# Patient Record
Sex: MALE | Race: BLACK OR AFRICAN AMERICAN | NOT HISPANIC OR LATINO | Employment: UNEMPLOYED | URBAN - METROPOLITAN AREA
[De-identification: names, ages, dates, MRNs, and addresses within clinical notes are randomized per-mention and may not be internally consistent; named-entity substitution may affect disease eponyms.]

---

## 2017-01-10 DIAGNOSIS — M54.16 LUMBAR RADICULITIS: ICD-10-CM

## 2017-01-10 DIAGNOSIS — M47.816 SPONDYLOSIS OF LUMBAR REGION WITHOUT MYELOPATHY OR RADICULOPATHY: ICD-10-CM

## 2017-01-10 RX ORDER — GABAPENTIN 300 MG/1
CAPSULE ORAL
Qty: 30 CAPSULE | Refills: 2 | Status: SHIPPED | OUTPATIENT
Start: 2017-01-10 | End: 2017-02-13 | Stop reason: SDUPTHER

## 2017-01-12 ENCOUNTER — TELEPHONE (OUTPATIENT)
Dept: PAIN MEDICINE | Facility: CLINIC | Age: 47
End: 2017-01-12

## 2017-01-12 NOTE — TELEPHONE ENCOUNTER
Spoke with patient and confirmed injection appointment tomorrow. Instructions taught again. Patient acknowledged.

## 2017-01-12 NOTE — TELEPHONE ENCOUNTER
----- Message from Ekta Addison sent at 1/12/2017  9:43 AM CST -----  Patient returned your call.  Call him at [phone].                           juarez

## 2017-01-13 ENCOUNTER — SURGERY (OUTPATIENT)
Age: 47
End: 2017-01-13

## 2017-01-13 ENCOUNTER — HOSPITAL ENCOUNTER (OUTPATIENT)
Dept: RADIOLOGY | Facility: HOSPITAL | Age: 47
Discharge: HOME | End: 2017-01-13
Attending: ANESTHESIOLOGY | Admitting: ANESTHESIOLOGY
Payer: COMMERCIAL

## 2017-01-13 DIAGNOSIS — M54.16 BILATERAL LUMBAR RADICULOPATHY: ICD-10-CM

## 2017-01-13 PROCEDURE — 64484 NJX AA&/STRD TFRM EPI L/S EA: CPT

## 2017-01-13 PROCEDURE — 64483 NJX AA&/STRD TFRM EPI L/S 1: CPT

## 2017-01-13 PROCEDURE — 25500020 PHARM REV CODE 255

## 2017-01-13 RX ADMIN — DEXAMETHASONE SODIUM PHOSPHATE 20 MG: 4 INJECTION, SOLUTION INTRA-ARTICULAR; INTRALESIONAL; INTRAMUSCULAR; INTRAVENOUS; SOFT TISSUE at 08:01

## 2017-01-18 ENCOUNTER — OFFICE VISIT (OUTPATIENT)
Dept: ORTHOPEDICS | Facility: CLINIC | Age: 47
End: 2017-01-18
Payer: COMMERCIAL

## 2017-01-18 ENCOUNTER — APPOINTMENT (OUTPATIENT)
Dept: RADIOLOGY | Facility: HOSPITAL | Age: 47
End: 2017-01-18
Attending: ORTHOPAEDIC SURGERY
Payer: COMMERCIAL

## 2017-01-18 VITALS
HEART RATE: 75 BPM | HEIGHT: 76 IN | WEIGHT: 220 LBS | DIASTOLIC BLOOD PRESSURE: 111 MMHG | SYSTOLIC BLOOD PRESSURE: 156 MMHG | BODY MASS INDEX: 26.79 KG/M2

## 2017-01-18 DIAGNOSIS — M23.91 INTERNAL DERANGEMENT OF KNEE JOINT, RIGHT: ICD-10-CM

## 2017-01-18 DIAGNOSIS — M25.561 RIGHT KNEE PAIN, UNSPECIFIED CHRONICITY: ICD-10-CM

## 2017-01-18 DIAGNOSIS — M17.32 POST-TRAUMATIC OSTEOARTHRITIS OF LEFT KNEE: Primary | ICD-10-CM

## 2017-01-18 DIAGNOSIS — M17.12 ARTHRITIS OF KNEE, LEFT: ICD-10-CM

## 2017-01-18 DIAGNOSIS — M25.561 RIGHT KNEE PAIN, UNSPECIFIED CHRONICITY: Primary | ICD-10-CM

## 2017-01-18 DIAGNOSIS — M23.91 INTERNAL DERANGEMENT OF KNEE JOINT, RIGHT: Primary | ICD-10-CM

## 2017-01-18 DIAGNOSIS — M51.36 DDD (DEGENERATIVE DISC DISEASE), LUMBAR: ICD-10-CM

## 2017-01-18 DIAGNOSIS — M47.816 SPONDYLOSIS OF LUMBAR REGION WITHOUT MYELOPATHY OR RADICULOPATHY: ICD-10-CM

## 2017-01-18 PROCEDURE — 99214 OFFICE O/P EST MOD 30 MIN: CPT | Mod: S$GLB,,, | Performed by: ORTHOPAEDIC SURGERY

## 2017-01-18 PROCEDURE — 73560 X-RAY EXAM OF KNEE 1 OR 2: CPT | Mod: TC,59,PO,LT

## 2017-01-18 PROCEDURE — 73562 X-RAY EXAM OF KNEE 3: CPT | Mod: 26,RT,, | Performed by: RADIOLOGY

## 2017-01-18 PROCEDURE — 99999 PR PBB SHADOW E&M-EST. PATIENT-LVL III: CPT | Mod: PBBFAC,,, | Performed by: ORTHOPAEDIC SURGERY

## 2017-01-18 PROCEDURE — 73560 X-RAY EXAM OF KNEE 1 OR 2: CPT | Mod: 26,59,LT, | Performed by: RADIOLOGY

## 2017-01-18 PROCEDURE — 1159F MED LIST DOCD IN RCRD: CPT | Mod: S$GLB,,, | Performed by: ORTHOPAEDIC SURGERY

## 2017-01-18 RX ORDER — OXAPROZIN 600 MG/1
1200 TABLET, FILM COATED ORAL DAILY
Qty: 60 TABLET | Refills: 0 | Status: SHIPPED | OUTPATIENT
Start: 2017-01-18 | End: 2017-03-11 | Stop reason: SDUPTHER

## 2017-01-18 NOTE — MR AVS SNAPSHOT
[facility]  [address]  [place] [address]  Phone: [phone]                  Cornel Dick   2017 9:00 AM   Office Visit    Description:  Male : 1970   Provider:  Cristiano Ibarra MD   Department:  [facility]           Reason for Visit     Right Knee - Pain           Diagnoses this Visit        Comments    Post-traumatic osteoarthritis of left knee    -  Primary     DDD (degenerative disc disease), lumbar         Spondylosis of lumbar region without myelopathy or radiculopathy         Internal derangement of knee joint, right         Arthritis of knee, left                To Do List           Future Appointments        Provider Department Dept Phone    2017 4:00 PM Luis Reyes MD [facility] [phone]    2017 8:15 AM Wilson Bahena PA-C [facility] [phone]    2017 9:20 AM Mabel Barillas DPM [facility] [phone]      Goals (5 Years of Data)     None      Follow-Up and Disposition     Return in about 3 weeks (around 2017).       These Medications        Disp Refills Start End    oxaprozin (DAYPRO) 600 mg tablet 60 tablet 0 2017     Take 2 tablets (1,200 mg total) by mouth once daily. - Oral    Pharmacy: [facility] [address] [place] [address] Ph #: [phone]         [facility] On [facility]     [facility] On [facility] Nurse Care Line -  Assistance  Registered nurses in the [facility] Center provide clinical advisement, health education, appointment booking, and other advisory services.  Call for this free service at [phone].             Medications           Message regarding Medications     Verify the changes and/or additions to your medication regime listed below are the same as discussed with your clinician today.  If any of these changes or additions are incorrect, please notify your healthcare provider.        CHANGE how you are taking these  "medications     Start Taking Instead of    oxaprozin (DAYPRO) 600 mg tablet oxaprozin (DAYPRO) 600 mg tablet    Dosage:  Take 2 tablets (1,200 mg total) by mouth once daily. Dosage:  TAKE ONE TABLET BY MOUTH ONCE DAILY    Reason for Change:  Reorder            Verify that the below list of medications is an accurate representation of the medications you are currently taking.  If none reported, the list may be blank. If incorrect, please contact your healthcare provider. Carry this list with you in case of emergency.           Current Medications     ACETAMINOPHEN (ARTHRITIS PAIN RELIEVER ORAL) Take by mouth.    amlodipine (NORVASC) 10 MG tablet     efinaconazole (JUBLIA) 10 % Lilibeth Apply 1 application topically once daily.    gabapentin (NEURONTIN) 300 MG capsule TAKE ONE CAPSULE BY MOUTH IN THE EVENING    hydrocodone-acetaminophen 5-325mg (NORCO) 5-325 mg per tablet Take 1 tablet by mouth 2 (two) times daily as needed for Pain.    lisinopril-hydrochlorothiazide (PRINZIDE,ZESTORETIC) 20-12.5 mg per tablet     MV-MN/FA/LYCOPENE/LUT/HB#178 (HUBERT MULTIVITAMIN FOR MEN ORAL) Take by mouth.    oxaprozin (DAYPRO) 600 mg tablet Take 2 tablets (1,200 mg total) by mouth once daily.    pravastatin (PRAVACHOL) 40 MG tablet     tizanidine (ZANAFLEX) 4 MG tablet TAKE 1 TABLET BY MOUTH NIGHTLY AS NEEDED    tramadol (ULTRAM) 50 mg tablet TAKE ONE TABLET BY MOUTH EVERY 6 HOURS AS NEEDED FOR PAIN           Clinical Reference Information           Vital Signs - Last Recorded  Most recent update: 1/18/2017  9:22 AM by Candida Angulo MA    BP Pulse Ht Wt BMI    (!) 156/111 75 6' 4" (1.93 m) 99.8 kg (220 lb) 26.78 kg/m2      Blood Pressure          Most Recent Value    BP  (!)  156/111      Allergies as of 1/18/2017     Chocolate Flavor      Immunizations Administered on Date of Encounter - 1/18/2017     None      [facility] Sign-Up     Activating your [facility] account is as easy as 1-2-3!     1) Visit [email or web address], select Sign Up " Now, enter this activation code and your date of birth, then select Next.  [email or web address]  Expires: 2/2/2017 12:41 PM      2) Create a username and password to use when you visit [facility] in the future and select a security question in case you lose your password and select Next.    3) Enter your e-mail address and click Sign Up!    Additional Information  If you have questions, please e-mail [email or web address] or call [phone] to talk to our [facility] staff. Remember, [facility] is NOT to be used for urgent needs. For medical emergencies, dial 911.

## 2017-01-24 DIAGNOSIS — M25.572 LEFT ANKLE PAIN: ICD-10-CM

## 2017-01-24 DIAGNOSIS — M17.12 OSTEOARTHRITIS OF LEFT KNEE, UNSPECIFIED OSTEOARTHRITIS TYPE: ICD-10-CM

## 2017-01-24 DIAGNOSIS — M25.562 LEFT KNEE PAIN: ICD-10-CM

## 2017-01-26 ENCOUNTER — HOSPITAL ENCOUNTER (OUTPATIENT)
Dept: RADIOLOGY | Facility: HOSPITAL | Age: 47
Discharge: HOME | End: 2017-01-26
Attending: ORTHOPAEDIC SURGERY
Payer: COMMERCIAL

## 2017-01-26 DIAGNOSIS — M25.561 RIGHT KNEE PAIN, UNSPECIFIED CHRONICITY: ICD-10-CM

## 2017-01-26 DIAGNOSIS — M23.91 INTERNAL DERANGEMENT OF KNEE JOINT, RIGHT: ICD-10-CM

## 2017-01-26 PROCEDURE — 73721 MRI JNT OF LWR EXTRE W/O DYE: CPT | Mod: TC,RT

## 2017-01-28 RX ORDER — TRAMADOL HYDROCHLORIDE 50 MG/1
TABLET ORAL
Qty: 50 TABLET | Refills: 0 | Status: SHIPPED | OUTPATIENT
Start: 2017-01-28 | End: 2017-02-01 | Stop reason: SDUPTHER

## 2017-01-31 ENCOUNTER — INITIAL CONSULT (OUTPATIENT)
Dept: RHEUMATOLOGY | Facility: CLINIC | Age: 47
End: 2017-01-31
Payer: COMMERCIAL

## 2017-01-31 ENCOUNTER — HOSPITAL ENCOUNTER (OUTPATIENT)
Dept: RADIOLOGY | Facility: HOSPITAL | Age: 47
Discharge: HOME | End: 2017-01-31
Attending: INTERNAL MEDICINE
Payer: COMMERCIAL

## 2017-01-31 VITALS
WEIGHT: 220.69 LBS | SYSTOLIC BLOOD PRESSURE: 150 MMHG | BODY MASS INDEX: 26.86 KG/M2 | DIASTOLIC BLOOD PRESSURE: 96 MMHG | HEART RATE: 78 BPM

## 2017-01-31 DIAGNOSIS — M25.50 POLYARTHRALGIA: ICD-10-CM

## 2017-01-31 DIAGNOSIS — M25.50 POLYARTHRALGIA: Primary | ICD-10-CM

## 2017-01-31 PROCEDURE — 96372 THER/PROPH/DIAG INJ SC/IM: CPT | Mod: S$GLB,,, | Performed by: INTERNAL MEDICINE

## 2017-01-31 PROCEDURE — 99999 PR PBB SHADOW E&M-EST. PATIENT-LVL III: CPT | Mod: PBBFAC,,, | Performed by: INTERNAL MEDICINE

## 2017-01-31 PROCEDURE — 73130 X-RAY EXAM OF HAND: CPT | Mod: 50,TC,PO

## 2017-01-31 PROCEDURE — 73130 X-RAY EXAM OF HAND: CPT | Mod: 26,50,, | Performed by: RADIOLOGY

## 2017-01-31 PROCEDURE — 73630 X-RAY EXAM OF FOOT: CPT | Mod: 26,50,, | Performed by: RADIOLOGY

## 2017-01-31 PROCEDURE — 73630 X-RAY EXAM OF FOOT: CPT | Mod: 50,TC,PO

## 2017-01-31 PROCEDURE — 99245 OFF/OP CONSLTJ NEW/EST HI 55: CPT | Mod: 25,S$GLB,, | Performed by: INTERNAL MEDICINE

## 2017-01-31 RX ORDER — BETAMETHASONE SODIUM PHOSPHATE AND BETAMETHASONE ACETATE 3; 3 MG/ML; MG/ML
6 INJECTION, SUSPENSION INTRA-ARTICULAR; INTRALESIONAL; INTRAMUSCULAR; SOFT TISSUE
Status: COMPLETED | OUTPATIENT
Start: 2017-01-31 | End: 2017-01-31

## 2017-01-31 RX ADMIN — BETAMETHASONE SODIUM PHOSPHATE AND BETAMETHASONE ACETATE 6 MG: 3; 3 INJECTION, SUSPENSION INTRA-ARTICULAR; INTRALESIONAL; INTRAMUSCULAR; SOFT TISSUE at 04:01

## 2017-01-31 NOTE — ASSESSMENT & PLAN NOTE
He has chronic pain over multiple joints including both small and large joints along with lumbago.  Examination shows mild puffiness around bilateral second and third metacarpophalangeal joints and second and third proximal interphalangeal joints but the tenderness was out of proportion likely secondary to the underlying myofascial pain syndrome.  Large joints like elbows, shoulders failed to show any effusion.  Tenderness present over bilateral knees but he has been diagnosed recently with soft tissue injury including medial meniscal tear in the background of severe asymmetric osteoarthritis in his knee joints.  Bilateral ankles were tender and hand effusion.  Bilateral MTP squeeze test positive.  Based on these findings it's possible he might have underlying autoimmune inflammatory connective tissue disease like rheumatoid arthritis.  But are in his knee joint is unlikely based on the asymmetry joint space narrowing which is very characteristic for primary osteoarthritis.  Check x-rays of hands and feet to look for any joint space narrowing or erosions.  Check labs to evaluate for any underlying autoimmune inflammatory connective tissue disease like rheumatoid arthritis, lupus or seronegative arthritis.  Will give 1 dose of IM Celestone today to look for any inflammation control which might also help to make the diagnosis if the serology is returned negative and inflammation levels returned high.

## 2017-01-31 NOTE — PROGRESS NOTES
Administered 1 cc Betamethasone 6mg/cc  to right ventrogluteal. Pt tolerated well. No acute reaction noted to site. Pt instructed on S/S to report. Pt verbalized understanding.     Lot: [id number]  Exp: 09/17

## 2017-01-31 NOTE — MR AVS SNAPSHOT
[facility] - [facility]  [address] [place] [place] [address]  Phone: [phone]  Fax: [phone]                  Cornel Dick   2017 4:00 PM   Initial consult    Description:  Male : 1970   Provider:  Luis Reyes MD   Department:  [facility]           Reason for Visit     Rheumatoid Arthritis           Diagnoses this Visit        Comments    Polyarthralgia    -  Primary            To Do List           Future Appointments        Provider Department Dept Phone    2017 8:15 AM Wilson Bahena PA-C [facility] [phone]    2017 9:20 AM Mabel Barillas DPM [facility] [phone]    2017 1:20 PM Greg Puente MD [facility] [phone]      Goals (5 Years of Data)     None      Follow-Up and Disposition     Return in about 4 weeks (around 2017).      [facility]     [facility] Care [facility] - 24/7 Assistance  Registered nurses in the [facility] provide clinical advisement, health education, appointment booking, and other advisory services.  Call for this free service at [phone].             Medications           Message regarding Medications     Verify the changes and/or additions to your medication regime listed below are the same as discussed with your clinician today.  If any of these changes or additions are incorrect, please notify your healthcare provider.        These medications were administered today        Dose Freq    betamethasone acetate-betamethasone sodium phosphate injection 6 mg 6 mg Clinic/HOD 1 time    Sig: Inject 1 mL (6 mg total) into the muscle one time.    Class: Normal    Route: Intramuscular           Verify that the below list of medications is an accurate representation of the medications you are currently taking.  If none reported, the list may be blank. If incorrect, please contact your healthcare provider. Carry this list with you in case of  emergency.           Current Medications     ACETAMINOPHEN (ARTHRITIS PAIN RELIEVER ORAL) Take by mouth.    amlodipine (NORVASC) 10 MG tablet     efinaconazole (JUBLIA) 10 % Lilibeth Apply 1 application topically once daily.    gabapentin (NEURONTIN) 300 MG capsule TAKE ONE CAPSULE BY MOUTH IN THE EVENING    lisinopril-hydrochlorothiazide (PRINZIDE,ZESTORETIC) 20-12.5 mg per tablet     MV-MN/FA/LYCOPENE/LUT/HB#178 (HUBERT MULTIVITAMIN FOR MEN ORAL) Take by mouth.    oxaprozin (DAYPRO) 600 mg tablet Take 2 tablets (1,200 mg total) by mouth once daily.    pravastatin (PRAVACHOL) 40 MG tablet     tizanidine (ZANAFLEX) 4 MG tablet TAKE 1 TABLET BY MOUTH NIGHTLY AS NEEDED    tramadol (ULTRAM) 50 mg tablet TAKE ONE TABLET BY MOUTH EVERY 6 HOURS AS NEEDED FOR PAIN           Clinical Reference Information           Vital Signs - Last Recorded  Most recent update: 1/31/2017  3:53 PM by Sterling Cintron LPN    BP Pulse Wt BMI       (!) 150/96 78 100.1 kg (220 lb 10.9 oz) 26.86 kg/m2       Blood Pressure          Most Recent Value    BP  (!)  150/96      Allergies as of 1/31/2017     Chocolate Flavor      Immunizations Administered on Date of Encounter - 1/31/2017     None      Orders Placed During Today's Visit     Future Labs/Procedures Expected by Expires    ROXIE  1/31/2017 4/1/2018    Cyclic citrul peptide antibody, IgG  1/31/2017 4/1/2018    Ferritin  1/31/2017 4/1/2018    Iron and TIBC  1/31/2017 4/1/2018    Rheumatoid factor  1/31/2017 4/1/2018    X-Ray Foot Complete Bilateral  1/31/2017 1/31/2018    X-Ray Hand 3 View Bilateral  1/31/2017 1/31/2018    Recurring Lab Work Interval Expires    C-reactive protein   1/31/2018    CBC auto differential   1/31/2018    Comprehensive metabolic panel   1/31/2018    Sedimentation rate, manual   1/31/2018      [facility] Sign-Up     Activating your [facility] account is as easy as 1-2-3!     1) Visit [email or web address], select Sign Up Now, enter this activation code and your date of birth, then  select Next.  [email or web address]  Expires: 2/2/2017 12:41 PM      2) Create a username and password to use when you visit [facility] in the future and select a security question in case you lose your password and select Next.    3) Enter your e-mail address and click Sign Up!    Additional Information  If you have questions, please e-mail [email or web address] or call [phone] to talk to our [facility] staff. Remember, [facility] is NOT to be used for urgent needs. For medical emergencies, dial 911.

## 2017-02-01 ENCOUNTER — OFFICE VISIT (OUTPATIENT)
Dept: PODIATRY | Facility: CLINIC | Age: 47
End: 2017-02-01
Payer: COMMERCIAL

## 2017-02-01 ENCOUNTER — OFFICE VISIT (OUTPATIENT)
Dept: ORTHOPEDICS | Facility: CLINIC | Age: 47
End: 2017-02-01
Payer: COMMERCIAL

## 2017-02-01 VITALS
DIASTOLIC BLOOD PRESSURE: 84 MMHG | SYSTOLIC BLOOD PRESSURE: 145 MMHG | HEART RATE: 70 BPM | BODY MASS INDEX: 26.87 KG/M2 | WEIGHT: 220.69 LBS | HEIGHT: 76 IN

## 2017-02-01 VITALS
HEART RATE: 74 BPM | BODY MASS INDEX: 26.79 KG/M2 | WEIGHT: 220 LBS | SYSTOLIC BLOOD PRESSURE: 168 MMHG | HEIGHT: 76 IN | DIASTOLIC BLOOD PRESSURE: 97 MMHG | RESPIRATION RATE: 18 BRPM

## 2017-02-01 DIAGNOSIS — M17.12 OSTEOARTHRITIS OF LEFT KNEE, UNSPECIFIED OSTEOARTHRITIS TYPE: ICD-10-CM

## 2017-02-01 DIAGNOSIS — M19.072 OSTEOARTHRITIS OF ANKLE AND FOOT, LEFT: ICD-10-CM

## 2017-02-01 DIAGNOSIS — M71.21 BAKER'S CYST OF KNEE, RIGHT: ICD-10-CM

## 2017-02-01 DIAGNOSIS — M65.9 TENOSYNOVITIS OF FOOT: ICD-10-CM

## 2017-02-01 DIAGNOSIS — S83.241A ACUTE MEDIAL MENISCUS TEAR OF RIGHT KNEE, INITIAL ENCOUNTER: Primary | ICD-10-CM

## 2017-02-01 DIAGNOSIS — M25.572 SINUS TARSI SYNDROME, LEFT: Primary | ICD-10-CM

## 2017-02-01 PROCEDURE — 99999 PR PBB SHADOW E&M-EST. PATIENT-LVL III: CPT | Mod: PBBFAC,,, | Performed by: PODIATRIST

## 2017-02-01 PROCEDURE — 20605 DRAIN/INJ JOINT/BURSA W/O US: CPT | Mod: S$GLB,,, | Performed by: PODIATRIST

## 2017-02-01 PROCEDURE — 99212 OFFICE O/P EST SF 10 MIN: CPT | Mod: S$GLB,,, | Performed by: PHYSICIAN ASSISTANT

## 2017-02-01 PROCEDURE — 99999 PR PBB SHADOW E&M-EST. PATIENT-LVL III: CPT | Mod: PBBFAC,,, | Performed by: PHYSICIAN ASSISTANT

## 2017-02-01 PROCEDURE — 99213 OFFICE O/P EST LOW 20 MIN: CPT | Mod: 25,S$GLB,, | Performed by: PODIATRIST

## 2017-02-01 RX ORDER — DEXAMETHASONE SODIUM PHOSPHATE 4 MG/ML
2 INJECTION, SOLUTION INTRA-ARTICULAR; INTRALESIONAL; INTRAMUSCULAR; INTRAVENOUS; SOFT TISSUE ONCE
Status: COMPLETED | OUTPATIENT
Start: 2017-02-01 | End: 2017-02-01

## 2017-02-01 RX ORDER — TRAMADOL HYDROCHLORIDE 50 MG/1
50 TABLET ORAL EVERY 6 HOURS PRN
Qty: 50 TABLET | Refills: 0 | Status: SHIPPED | OUTPATIENT
Start: 2017-02-01 | End: 2017-04-25

## 2017-02-01 RX ADMIN — DEXAMETHASONE SODIUM PHOSPHATE 2 MG: 4 INJECTION, SOLUTION INTRA-ARTICULAR; INTRALESIONAL; INTRAMUSCULAR; INTRAVENOUS; SOFT TISSUE at 11:02

## 2017-02-01 NOTE — LETTER
February 1, 2017      Nataliia Shanks, NP  [address] [place] [address] [address] [place] [address]           [facility]  [address] [place] [address]  Phone: [phone]  Fax: [phone]          Patient: Cornel Dick   MR Number: [id number]   YOB: 1970   Date of Visit: 2/1/2017       Dear Nataliia Shanks:    Thank you for referring Cornel Dick to me for evaluation. Attached you will find relevant portions of my assessment and plan of care.    If you have questions, please do not hesitate to call me. I look forward to following Cornel Dick along with you.    Sincerely,    Wilson Bahena PA-C    Enclosure  CC:  No Recipients    If you would like to receive this communication electronically, please contact [email or web address] or [phone] to request more information on Personal Link access.    For providers and/or their staff who would like to refer a patient to [facility], please contact us through our one-stop-shop provider referral line, [facility], at [phone].    If you feel you have received this communication in error or would no longer like to receive these types of communications, please e-mail [email or web address]

## 2017-02-01 NOTE — PROGRESS NOTES
Subjective:      Patient ID: Cornel Dick is a 46 y.o. male.    Chief Complaint: Ankle Pain (left)    Cornel Dick is a 46 y.o. year-old male following up for left rearfoot and ankle pain. Patient now rates pain as 7/10 shooting  sharp, aching, throbbing, burning, stabbing and tingling that is unchanged. The patient relates that he has not noticed any improvement to his left foot and ankle pain following injections to his back.  He continues to take tramadol prescribed by Dr. Pagan for his knee and Norco by Dr. Puente to control his back pain.  He states that he did purchase the recommended shoes and insoles but does not have them with him.  He also states that he forgot to bring his pain log recording his pain level since last visit.  He also has been going to physical therapy and reports that the physical therapy may be improved status foot and ankle pain by about 20%.  He is also been using a prefab solid ankle brace which also helps and gives him some support and relief from pain.  He is here today for serial therapeutic diagnostic injection to his left ankle.        Review of Systems   Constitution: Negative for chills and fever.   Cardiovascular: Negative for chest pain.   Respiratory: Negative for shortness of breath.    Gastrointestinal: Negative for nausea and vomiting.           Objective:      Physical Exam   Constitutional: He is oriented to person, place, and time. He appears well-developed and well-nourished. No distress.   Cardiovascular:   Pulses:       Dorsalis pedis pulses are 2+ on the right side, and 2+ on the left side.        Posterior tibial pulses are 2+ on the right side, and 2+ on the left side.   Capillary fill time 3 seconds to digits bilateral feet.   Musculoskeletal:   Lower extremities:    Deformities: None    Normal arch height and rectus feet bilaterally.     5/5 muscle strength and tone in all four quadrants bilaterally.     Pain with mils guarding on range of motion  in all four quadrants WNL left.   Pain-free range of motion in all four quadrants WNL right.    Pain on palpation: Sinus tarsi and medial lateral and anterior ankle gutter on the left.     Neurological: He is alert and oriented to person, place, and time. He displays no Babinski's sign on the right side. He displays no Babinski's sign on the left side.   Plantar protective threshold sensation by touch via 5.07 SWMF present bilaterally.    Skin:   Lower extremities:    Normal turgor, texture, temperature bilaterally. Digital hair present bilaterally. Warm equally bilaterally.    Mild left ankle and sinus tarsi edema.    No varicosities, pigmentary changes.    No wounds, drainage, malodor, erythema, interdigital maceration were noted.  Scaling in a moccasin distribution.     Nails are thick discolored dystrophic on the left first and fifth digits.   Psychiatric: He has a normal mood and affect.   Nursing note and vitals reviewed.            Assessment:       Encounter Diagnoses   Name Primary?    Sinus tarsi syndrome, left Yes    Osteoarthritis of ankle and foot, left     Tenosynovitis of foot          Plan:       Cornel was seen today for ankle pain.    Diagnoses and all orders for this visit:    Sinus tarsi syndrome, left    Osteoarthritis of ankle and foot, left    Tenosynovitis of foot    Other orders  -     dexamethasone injection 2 mg; Inject 0.5 mLs (2 mg total) into the articular space once.      I counseled the patient on his conditions, their implications and medical management.    Patient was educated and counseled regarding sinus tarsi syndrome. I explained to the patient that the current presentation may be a result of an impingement near the ankle joint due to prior injury. The patient was given recommendations for orthotic inserts and supportive shoes to appropriately limit pronation and impingement.     Because of acute pain and inflammation, patient wished to proceed with injection. Injection was  administered to the affected joint with a mixture of 1 ml 1% lidocaine plain and .5ml dexamethasone phosphate. The patient was guided on oral anti-inflammatories for pain and inflammation to be discontinued if any stomach irritation occurs.    He will continue with his pain medications as prescribed by his pain and orthopedic doctor.  He was also reminded to continue with the pain log and to bring that and his shoes and insoles with him on follow-up.  He will also continue with the ankle brace and physical therapy.  We discussed going ahead and trying an ankle injection on follow-up depending on his pain level.    He may continue with current maintenance care recommendations for his athlete's foot and fungal nails.    .

## 2017-02-01 NOTE — PROGRESS NOTES
Subjective:      Patient ID: Cornel Dick is a 46 y.o. male.    Chief Complaint: Pain of the Right Knee and Results (MRI)    HPI  The patient presents to clinic for follow-up of right knee pain and to discuss MRI results. He has a confirmed medial meniscus tear and a small baker's cyst. The patient still has pain especially with certain movements and positions. On average his pain is a 6/10. He also complains of painful popping and feels as though he cannot trust his knee. He has been dealing with this pain for about 2 months. Since onset, his pain has worsened.     Review of Systems   Constitution: Negative for chills and fever.   Gastrointestinal: Negative for abdominal pain, diarrhea, nausea and vomiting.         Objective:            Ortho/SPM Exam  On exam, the patient has medial joint line tenderness and lateral joint line tenderness. He also has pain over patella. No swelling or effusion. ROM is within normal limits. Cruciate and collateral ligaments are stable.     MRI:  Exam: MRI LOWER EXTREMITY JOINT WITHOUT CONTRAST RIGHT,    Date:  01/26/17 13:26:29    History: M23.91 Unspecified internal derangement of right knee; M25.561 Pain in right knee;    Comparison:  No prior relevant studies available    Findings: Multiplanar noncontrast MRI right knee.    The anterior and posterior cruciate ligaments are intact.    The lateral knee compartment reveals normal signal and morphology of the cartilage, meniscus and collateral ligaments.  No marrow edema is seen.    The medial knee compartment reveals mild chondral thinning.  There is medial and expulsion of the meniscal body.  There is a prominent horizontal cleavage tear extending through the posterior horn of the medial meniscus extending to the inferior articular surface.  In addition there is abnormal signal consistent with a tear of the body of the medial meniscus.  This is probably best seen on sagittal images 5, 6 and 7 and coronal images 14 and 15.   Small posterior popliteal fossa cyst is present.    No marrow edema.  The medial collateral ligament is intact.    The extensor mechanism including the patellar cartilage is normal.   Impression     Medial meniscal tear involving the posterior horn and body.  Otherwise negative.               Assessment:       Encounter Diagnoses   Name Primary?    Acute medial meniscus tear of right knee, initial encounter Yes    Baker's cyst of knee, right           Plan:       Cornel was seen today for results and pain.    Diagnoses and all orders for this visit:    Acute medial meniscus tear of right knee, initial encounter    Baker's cyst of knee, right      The patient was offered a steroid injection today but decline because he recently had a RA injection for hip pain. He was given a prescription for tramadol as requested today.   He will follow-up with Dr. Puente in a few days after having spine injection on 1/13/17.     The patient will resume outpatient PT at [facility]. The patient will follow-up in our clinic in 6 weeks to re-evaluate his knee pain. Should his knee pain worsen, will consider outpatient knee ATS.  The patient also has ongoing pain in his left knee.  He is giving serious consideration for a left total knee replacement.

## 2017-02-01 NOTE — MR AVS SNAPSHOT
[facility]  [address]  [place] [address]  Phone: [phone]  Fax: [phone]                  Cornel Dick   2017 9:20 AM   Office Visit    Description:  Male : 1970   Provider:  Mabel Barillas DPM   Department:  [facility]           Reason for Visit     Ankle Pain                To Do List           Future Appointments        Provider Department Dept Phone    2017 1:20 PM Greg Puente MD [facility] [phone]    2017 4:00 PM Luis Reyes MD [facility] - Rheumatology [phone]    3/1/2017 10:40 AM Mabel Barillas DPM [facility] [phone]    3/15/2017 1:00 PM Wilson Bahena PA-C [facility] [phone]      Goals (5 Years of Data)     None      [facility] On Call     [facility] On [facility] Nurse Care Line -  Assistance  Registered nurses in the [facility] provide clinical advisement, health education, appointment booking, and other advisory services.  Call for this free service at [phone].             Medications           Message regarding Medications     Verify the changes and/or additions to your medication regime listed below are the same as discussed with your clinician today.  If any of these changes or additions are incorrect, please notify your healthcare provider.             Verify that the below list of medications is an accurate representation of the medications you are currently taking.  If none reported, the list may be blank. If incorrect, please contact your healthcare provider. Carry this list with you in case of emergency.           Current Medications     ACETAMINOPHEN (ARTHRITIS PAIN RELIEVER ORAL) Take by mouth.    amlodipine (NORVASC) 10 MG tablet     efinaconazole (JUBLIA) 10 % Lilibeth Apply 1 application topically once daily.    gabapentin (NEURONTIN) 300 MG capsule TAKE ONE CAPSULE BY MOUTH IN THE EVENING    lisinopril-hydrochlorothiazide  "(PRINZIDE,ZESTORETIC) 20-12.5 mg per tablet     MV-MN/FA/LYCOPENE/LUT/HB#178 (HUBERT MULTIVITAMIN FOR MEN ORAL) Take by mouth.    oxaprozin (DAYPRO) 600 mg tablet Take 2 tablets (1,200 mg total) by mouth once daily.    pravastatin (PRAVACHOL) 40 MG tablet     tizanidine (ZANAFLEX) 4 MG tablet TAKE 1 TABLET BY MOUTH NIGHTLY AS NEEDED    tramadol (ULTRAM) 50 mg tablet Take 1 tablet (50 mg total) by mouth every 6 (six) hours as needed.           Clinical Reference Information           Vital Signs - Last Recorded  Most recent update: 2/1/2017 10:13 AM by Chasidy Roberts MA    BP Pulse Resp Ht Wt BMI    (!) 168/97 (BP Location: Right arm, Patient Position: Sitting, BP Method: Automatic) 74 18 6' 4" (1.93 m) 99.8 kg (220 lb) 26.78 kg/m2      Blood Pressure          Most Recent Value    BP  (!)  168/97      Allergies as of 2/1/2017     Chocolate Flavor      Immunizations Administered on Date of Encounter - 2/1/2017     None      [facility] Sign-Up     Activating your [facility] account is as easy as 1-2-3!     1) Visit [email or web address], select Sign Up Now, enter this activation code and your date of birth, then select Next.  [email or web address]  Expires: 2/2/2017 12:41 PM      2) Create a username and password to use when you visit [facility] in the future and select a security question in case you lose your password and select Next.    3) Enter your e-mail address and click Sign Up!    Additional Information  If you have questions, please e-mail [email or web address] or call [phone] to talk to our [facility] staff. Remember, [facility] is NOT to be used for urgent needs. For medical emergencies, dial 911.         "

## 2017-02-02 ENCOUNTER — TELEPHONE (OUTPATIENT)
Dept: RHEUMATOLOGY | Facility: CLINIC | Age: 47
End: 2017-02-02

## 2017-02-02 DIAGNOSIS — M25.50 POLYARTHRALGIA: Primary | ICD-10-CM

## 2017-02-02 NOTE — TELEPHONE ENCOUNTER
----- Message from Luis Reyes MD sent at 2/2/2017  4:19 PM CST -----  Labs show no evidence of rheumatoid arthritis. Everything points towards degenerative arthritis. Will decide further therapy based on his reaction to the injection given during the visit.

## 2017-02-02 NOTE — TELEPHONE ENCOUNTER
Advised pt of results below, pt states that the injection did not help at all. States his hands hurt to even write and they have been cramping real bad. Pt states that Orthopedics (Dr. Ibarra, before he was at [facility]) told him he defiantly has Rheumatoid in his knee because they did a test in 2012.  Please Advise.

## 2017-02-03 RX ORDER — PREDNISONE 10 MG/1
TABLET ORAL
Qty: 60 TABLET | Refills: 1 | Status: SHIPPED | OUTPATIENT
Start: 2017-02-03 | End: 2017-02-28

## 2017-02-03 NOTE — TELEPHONE ENCOUNTER
I am not sure what those labs were. Let us try to get those records . In the mean time, start anti inflammatory medication prednisone and I will discuss further during the next visit. Thanks.

## 2017-02-06 ENCOUNTER — OFFICE VISIT (OUTPATIENT)
Dept: PAIN MEDICINE | Facility: CLINIC | Age: 47
End: 2017-02-06
Payer: COMMERCIAL

## 2017-02-06 VITALS
HEIGHT: 76 IN | SYSTOLIC BLOOD PRESSURE: 134 MMHG | RESPIRATION RATE: 18 BRPM | WEIGHT: 220 LBS | BODY MASS INDEX: 26.79 KG/M2 | HEART RATE: 70 BPM | DIASTOLIC BLOOD PRESSURE: 85 MMHG

## 2017-02-06 DIAGNOSIS — M51.36 DDD (DEGENERATIVE DISC DISEASE), LUMBAR: ICD-10-CM

## 2017-02-06 DIAGNOSIS — M54.16 LUMBAR RADICULOPATHY: Primary | ICD-10-CM

## 2017-02-06 DIAGNOSIS — M54.16 BILATERAL LUMBAR RADICULOPATHY: ICD-10-CM

## 2017-02-06 DIAGNOSIS — M47.817 LUMBOSACRAL SPONDYLOSIS WITHOUT MYELOPATHY: Primary | ICD-10-CM

## 2017-02-06 DIAGNOSIS — M47.817 SPONDYLOSIS OF LUMBOSACRAL REGION WITHOUT MYELOPATHY OR RADICULOPATHY: ICD-10-CM

## 2017-02-06 PROCEDURE — 99214 OFFICE O/P EST MOD 30 MIN: CPT | Mod: S$GLB,,, | Performed by: PHYSICIAN ASSISTANT

## 2017-02-06 PROCEDURE — 99999 PR PBB SHADOW E&M-EST. PATIENT-LVL IV: CPT | Mod: PBBFAC,,, | Performed by: PHYSICIAN ASSISTANT

## 2017-02-06 RX ORDER — HYDROCODONE BITARTRATE AND ACETAMINOPHEN 5; 325 MG/1; MG/1
1 TABLET ORAL 2 TIMES DAILY PRN
Qty: 60 TABLET | Refills: 0 | Status: SHIPPED | OUTPATIENT
Start: 2017-02-06 | End: 2017-03-20 | Stop reason: SDUPTHER

## 2017-02-06 NOTE — MR AVS SNAPSHOT
[facility]  [address]  [place] [address]  Phone: [phone]  Fax: [phone]                  Cornel Dick   2017 1:20 PM   Office Visit    Description:  Male : 1970   Provider:  Magda Bynum PA-C   Department:  [facility]           Reason for Visit     Low-back Pain           Diagnoses this Visit        Comments    Lumbosacral spondylosis without myelopathy    -  Primary     Spondylosis of lumbosacral region without myelopathy or radiculopathy         Bilateral lumbar radiculopathy         DDD (degenerative disc disease), lumbar                To Do List           Future Appointments        Provider Department Dept Phone    2017 10:00 AM [facility] PAIN1 [facility] - [phone]    2017 4:00 PM Luis Reyes MD [facility] - Rheumatology [phone]    3/1/2017 10:40 AM Mabel Barillas DPM [facility] - Podiatry [phone]    3/15/2017 1:00 PM Wilson Bahena PA-C [facility] Orthopedics [phone]    3/20/2017 1:40 PM Magda Bynum PA-C [facility] [phone]      Your Future Surgeries/Procedures     2017   Surgery with Greg Puente MD   [facility])    [id number] [facility] [address] [phone]              Goals (5 Years of Data)     None      Follow-Up and Disposition     Return for Lumbar KAVITA in 1-3 weeks, and clinic follow-up about 3 weeks after procedure.       These Medications        Disp Refills Start End    hydrocodone-acetaminophen 5-325mg (NORCO) 5-325 mg per tablet 60 tablet 0 2017 3/8/2017    Take 1 tablet by mouth 2 (two) times daily as needed for Pain. - Oral    Pharmacy: [facility] [address] [place] [address] Ph #: [phone]         [facility]     [facility] On Call Nurse Care Line -  Assistance  Registered nurses in the [facility]  "provide clinical advisement, health education, appointment booking, and other advisory services.  Call for this free service at [phone].             Medications           Message regarding Medications     Verify the changes and/or additions to your medication regime listed below are the same as discussed with your clinician today.  If any of these changes or additions are incorrect, please notify your healthcare provider.             Verify that the below list of medications is an accurate representation of the medications you are currently taking.  If none reported, the list may be blank. If incorrect, please contact your healthcare provider. Carry this list with you in case of emergency.           Current Medications     ACETAMINOPHEN (ARTHRITIS PAIN RELIEVER ORAL) Take by mouth.    amlodipine (NORVASC) 10 MG tablet     efinaconazole (JUBLIA) 10 % Lilibeth Apply 1 application topically once daily.    gabapentin (NEURONTIN) 300 MG capsule TAKE ONE CAPSULE BY MOUTH IN THE EVENING    hydrocodone-acetaminophen 5-325mg (NORCO) 5-325 mg per tablet Take 1 tablet by mouth 2 (two) times daily as needed for Pain.    lisinopril-hydrochlorothiazide (PRINZIDE,ZESTORETIC) 20-12.5 mg per tablet     MV-MN/FA/LYCOPENE/LUT/HB#178 (HUBERT MULTIVITAMIN FOR MEN ORAL) Take by mouth.    oxaprozin (DAYPRO) 600 mg tablet Take 2 tablets (1,200 mg total) by mouth once daily.    pravastatin (PRAVACHOL) 40 MG tablet     predniSONE (DELTASONE) 10 MG tablet Take 20 mg daily for one week, then 15 mg daily next week, then 10 mg daily next week and 5 mg daily thereafter.    tizanidine (ZANAFLEX) 4 MG tablet TAKE 1 TABLET BY MOUTH NIGHTLY AS NEEDED    tramadol (ULTRAM) 50 mg tablet Take 1 tablet (50 mg total) by mouth every 6 (six) hours as needed.           Clinical Reference Information           Your Vitals Were     BP Pulse Resp Height Weight BMI    134/85 (BP Location: Right arm, Patient Position: Sitting, BP Method: Automatic) 70 18 6' 4" " (1.93 m) 99.8 kg (220 lb) 26.78 kg/m2      Blood Pressure          Most Recent Value    BP  134/85      Allergies as of 2/6/2017     Chocolate Flavor      Immunizations Administered on Date of Encounter - 2/6/2017     None      Orders Placed During Today's Visit     Future Labs/Procedures Expected by Expires    IR Epidural Transfor Inj Ea Add Lumb Uni  2/6/2017 2/6/2018    IR Epidural Transforaminal Inj 1st Vert Lumbar Uni  2/6/2017 2/6/2018      [facility] Sign-Up     Activating your [facility] account is as easy as 1-2-3!     1) Visit [email or web address], select Sign Up Now, enter this activation code and your date of birth, then select Next.  [email or web address]  Expires: 3/24/2017  1:24 PM      2) Create a username and password to use when you visit [facility] in the future and select a security question in case you lose your password and select Next.    3) Enter your e-mail address and click Sign Up!    Additional Information  If you have questions, please e-mail [email or web address] or call [phone] to talk to our [facility] staff. Remember, [facility] is NOT to be used for urgent needs. For medical emergencies, dial 911.         Language Assistance Services     ATTENTION: Language assistance services are available, free of charge. Please call [phone].      ATENCIÓN: Si habla español, tiene a celaya disposición servicios gratuitos de asistencia lingüística. Llame al [phone].     [facility] Ý: N?u b?n nói Ti?ng Vi?t, có các d?ch v? h? tr? ngôn ng? mi?n phí dành cho b?n. G?i s? [phone].         [facility] complies with applicable Federal civil rights laws and does not discriminate on the basis of race, color, national origin, age, disability, or sex.

## 2017-02-06 NOTE — PROGRESS NOTES
Chief Pain Complaint:  Low Back Pain, Bilateral Leg Pain (R>L)    History of Present Illness:  This patient is a 46 y.o. male who presents today complaining of the above noted pain/s. The patient describes the pain as follows.    - duration of pain: ~ 3 years   - timing: intermittent   - character: aching, shooting  - radiating, dermatomal: extends into bilateral lower extremities across dermatomes, R>L  - antecedent trauma, prior spinal surgery: no prior trauma, no prior spinal surgery   - pertinent negatives: No fever, No chills, No weight loss, No bladder dysfunction, No bowel dysfunction, No saddle anesthesia  - pertinent positives: generalized nonspecific Lower Extremity weakness bilaterally    - medications, other therapies tried (physical therapy, injections):     >> gabapentin, zanaflex, Tramadol, norco    >> Has previously undergone Physical Therapy    >> Has previously undergone spinal injection/s: right L3/L4, L5/S1 TF KAVITA on 1-13-17 with limited relief      Imaging / Labs / Studies (reviewed on 2/6/2017):    11/16/16 MRI Lumbar Spine Without Contrast    Narrative Technique:  Multiplanar, multisequence MR images were performed of the lumbar spine obtained without contrast.   Comparison: None.   Results:  Lumbar spine alignment is within normal limits. The vertebral body heights are well maintained, with no fracture.  No marrow signal abnormality suspicious for an infiltrative process.    The conus medullaris terminates at approximately the L1-L2 disk space.  The adjacent soft tissue structures show no significant abnormalities.  There is mild disc desiccation at the L3-L4 and L5-S1 discs.  Minimal disc space narrowing noted at these levels.  L1-L2: No significant central canal or neural foraminal narrowing.  L2-L3: No significant central canal or neural foraminal narrowing.  L3-L4: There is a broad-based right foraminal disc extrusion that results in moderate effacement of the exiting L3 nerve root.  No  "significant central canal narrowing.  L4-L5:  No significant central canal or neural foraminal narrowing.  L5-S1:  Broad-based right paracentral/right foraminal disc protrusion resulting in no significant central canal narrowing.  The right neural foraminal canal is mildly to moderately narrowed.  There is abutment of the exiting L5 nerve root.  No significant effacement of this nerve root.       5/13/16 X-Ray Lumbar Spine Complete 5 View    Narrative Five view lumbar spine.  Findings: Spinal alignment is anatomic.  Mild disc space narrowing and facet arthropathy at L5-S1.  Pedicles appear intact.  No compression fracture or subluxation.       Review of Systems:  CONSTITUTIONAL: patient denies any fever, chills, or weight loss  SKIN: patient denies any rash or itching  RESPIRATORY: patient denies having any shortness of breath  GASTROINTESTINAL: patient reports constipation  GENITOURINARY: patient denies having any abnormal bladder function    MUSCULOSKELETAL:  - patient complains of the above noted pain/s (see chief pain complaint)    NEUROLOGICAL:   - pain as above  - strength in Lower extremities is decreased, BILATERALLY  - sensation in Lower extremities is abnormal, BILATERALLY  - patient denies any loss of bowel or bladder control      PSYCHIATRIC: patient denies any change in mood      Physical Exam:  Visit Vitals    /85 (BP Location: Right arm, Patient Position: Sitting, BP Method: Automatic)    Pulse 70    Resp 18    Ht 6' 4" (1.93 m)    Wt 99.8 kg (220 lb)    BMI 26.78 kg/m2    (reviewed on 2/6/2017)    General: alert and oriented, in no apparent distress  Gait: antalgic gait, uses a cane to assist ambulation  Skin: No rashes, No discoloration, No obvious lesions  HEENT: EOMI  Respiratory: respirations nonlabored    Musculoskeletal:  - Any pain on flexion, extension, rotation:    >> pain on extension and rotation  - Straight Leg Raise:     >> LEFT :: negative    >> RIGHT :: negative  - Any " "tenderness to palpation across paraspinal muscles, joints, bursae:     >> across lumbar paraspinals    Neuro:  - Extremity Strength:     >> LEFT :: decreased throughout, worse with dorsiflexion/ plantar flexion    >> RIGHT :: 5/5  - Extremity Reflexes:    >> LEFT  :: 2+    >> RIGHT :: 2+  - Sensory (sensation to light touch):    >> LEFT :: intact    >> RIGHT :: intact     Psych:  Mood and affect is appropriate      Assessment:  Lumbar Radiculopathy  Lumbar Spondylosis   Lumbar DDD      Plan:  Patient returns for follow-up. He has bilateral leg pain (R>L) across dermatomes, and he has low back pain as well.  MRI shows DDD at L3/4 and L5/S1 with right side extension and encroachment of nerve roots, NF narrowing, and facet disease as well. There seems to be no indication for left side radicular pain based on the MRI. He has been seen by Neurosurgery at the [facility], who recommended spinal injections (records attached into "Media").   - S/p right L3/L4, L5/S1 TF KAVITA on 1-13-17 with limited relief.   - Will repeat this injection, which will hopefully give him some relief. I discussed with him in detail that there's a chance he won't get relief since it was so limited with 1st injection. He is adamantly wanting to try all options before moving forward with potential surgery.  - He states he has RA but has never seen a Rheumatologist. We referred him last visit, and he has since seen Dr. BIRCH on 1-31-17. Per his note, his main diagnosis was polyarthralgia, and he was ordering future labs and radiology to further evaluate.  - Refill Norco 5mg #60 PRN, which he states he is only taking at night. He also has Rx of tramadol that he takes during the day.  - Increase gabapentin 300mg QHS to 900mg/day with gradual increase (300mg QAM, 600mg QHS).  - [place]  only show Rx of tramadol #50 on 12-27-16 & 1-28-17.   RTC after injection if needed. I discussed the risks, benefits, and alternatives to potential treatment options. All questions and " concerns were fully addressed today in clinic. Dr. Puente was consulted regarding the patient plan and agrees.             >>Pain Disability Questionnaire:  12/20/2016 :: 83    >>Opioid Risk Tool:  12/20/2016 :: 1    >>UDS:  12/20/2016 :: negative (at initial consult)    >> PDI:  2/6/2017 :: 39

## 2017-02-10 ENCOUNTER — TELEPHONE (OUTPATIENT)
Dept: RHEUMATOLOGY | Facility: CLINIC | Age: 47
End: 2017-02-10

## 2017-02-10 NOTE — TELEPHONE ENCOUNTER
----- Message from Scarlett Inman sent at 2/10/2017  1:12 PM CST -----  Contact: pt  Pt is returning nurse call. Pls call pt back at [phone]

## 2017-02-10 NOTE — TELEPHONE ENCOUNTER
Returned call to patient and left message advising him that the pharmacy does have the RX and to call back with any questions

## 2017-02-10 NOTE — TELEPHONE ENCOUNTER
Spoke with pt and he was calling about an RX from Dr. Puente. Advised him he would have to call them but as far as the prednisone the pharmacy does have that for him. Pt verbalized understanding

## 2017-02-10 NOTE — TELEPHONE ENCOUNTER
attempted multiple times to call [facility] and line was busy. Will try again later to confirm receipt of RX sent on 2.3.17

## 2017-02-10 NOTE — TELEPHONE ENCOUNTER
----- Message from Kailey Thomas sent at 2/10/2017 11:21 AM CST -----  Contact: pt   Call pt regarding some med that was suppose to be called in.   [phone]

## 2017-02-13 ENCOUNTER — TELEPHONE (OUTPATIENT)
Dept: PAIN MEDICINE | Facility: CLINIC | Age: 47
End: 2017-02-13

## 2017-02-13 DIAGNOSIS — M47.816 SPONDYLOSIS OF LUMBAR REGION WITHOUT MYELOPATHY OR RADICULOPATHY: ICD-10-CM

## 2017-02-13 DIAGNOSIS — M54.16 LUMBAR RADICULITIS: ICD-10-CM

## 2017-02-13 RX ORDER — GABAPENTIN 300 MG/1
CAPSULE ORAL
Qty: 90 CAPSULE | Refills: 0 | Status: SHIPPED | OUTPATIENT
Start: 2017-02-13 | End: 2017-03-17 | Stop reason: SDUPTHER

## 2017-02-13 NOTE — TELEPHONE ENCOUNTER
----- Message from Magda Bynum PA-C sent at 2/13/2017  8:08 AM CST -----  Contact: pt  Rx sent into pharmacy. Please let patient know. Thanks    ----- Message -----     From: Chasidy Roberts MA     Sent: 2/13/2017   7:20 AM       To: Magda Bynum PA-C        ----- Message -----     From: Gray Aguilar     Sent: 2/10/2017   4:23 PM       To: Misa FONSECA Staff    He's calling in regards to a RX refill for: Gabapentin (90 pills for a 30 day supply) , pt states he needs an increase in the dosage amount, [facility] pharmacy in [place] please advise, [phone] (cell)

## 2017-02-15 ENCOUNTER — TELEPHONE (OUTPATIENT)
Dept: PODIATRY | Facility: CLINIC | Age: 47
End: 2017-02-15

## 2017-02-15 NOTE — TELEPHONE ENCOUNTER
----- Message from Diane Carvajal sent at 2/15/2017  9:15 AM CST -----  Contact: Patient  Patient called to speak with Zully about rescheduling his procedure. Please call him at [phone].    Thanks,  Diane

## 2017-02-28 ENCOUNTER — OFFICE VISIT (OUTPATIENT)
Dept: RHEUMATOLOGY | Facility: CLINIC | Age: 47
End: 2017-02-28
Payer: COMMERCIAL

## 2017-02-28 VITALS
DIASTOLIC BLOOD PRESSURE: 96 MMHG | SYSTOLIC BLOOD PRESSURE: 155 MMHG | BODY MASS INDEX: 27.83 KG/M2 | HEART RATE: 65 BPM | WEIGHT: 228.63 LBS

## 2017-02-28 DIAGNOSIS — M25.50 POLYARTHRALGIA: Primary | ICD-10-CM

## 2017-02-28 PROCEDURE — 99213 OFFICE O/P EST LOW 20 MIN: CPT | Mod: S$GLB,,, | Performed by: INTERNAL MEDICINE

## 2017-02-28 PROCEDURE — 99999 PR PBB SHADOW E&M-EST. PATIENT-LVL III: CPT | Mod: PBBFAC,,, | Performed by: INTERNAL MEDICINE

## 2017-02-28 PROCEDURE — 1160F RVW MEDS BY RX/DR IN RCRD: CPT | Mod: S$GLB,,, | Performed by: INTERNAL MEDICINE

## 2017-02-28 NOTE — ASSESSMENT & PLAN NOTE
Chronic mechanical and inflammatory pain over both small and large joints with features of severe DJD over large joints without any active synovitis over small joints. Labs failed to show any underlying autoimmune inflammatory arthritides . Xray images of hands where he hurts the most show only mild DJD. Will get MRI of right hand to rule even remote possibility of occult synovitis which is undetectable on exam. If MRI show no arthritides , would recommend treating for chronic pain syndrome.

## 2017-02-28 NOTE — MR AVS SNAPSHOT
[facility]  [address] [facility] [address] [place] [address]  Phone: [phone]  Fax: [phone]                  Cornel Dick   2017 4:00 PM   Office Visit    Description:  Male : 1970   Provider:  Luis Reyes MD   Department:  [facility]           Reason for Visit     Disease Management           Diagnoses this Visit        Comments    Polyarthralgia    -  Primary            To Do List           Future Appointments        Provider Department Dept Phone    3/1/2017 10:40 AM Mabel Barillas DPSHOSHANA [facility] [phone]    3/10/2017 2:00 PM [facility] PAIN1 [facility] - [phone]    3/15/2017 1:00 PM [facility] - Orthopedics [phone]    3/20/2017 1:40 PM Magda Bynum PA-C [facility] [phone]      Goals (5 Years of Data)     None      [facility]     [facility] On Call Nurse Care Line -  Assistance  Registered nurses in the [facility] provide clinical advisement, health education, appointment booking, and other advisory services.  Call for this free service at [phone].             Medications           Message regarding Medications     Verify the changes and/or additions to your medication regime listed below are the same as discussed with your clinician today.  If any of these changes or additions are incorrect, please notify your healthcare provider.        STOP taking these medications     predniSONE (DELTASONE) 10 MG tablet Take 20 mg daily for one week, then 15 mg daily next week, then 10 mg daily next week and 5 mg daily thereafter.           Verify that the below list of medications is an accurate representation of the medications you are currently taking.  If none reported, the list may be blank. If incorrect, please contact your healthcare provider. Carry this list with you in case of emergency.           Current Medications     ACETAMINOPHEN (ARTHRITIS PAIN  RELIEVER ORAL) Take by mouth.    amlodipine (NORVASC) 10 MG tablet     efinaconazole (JUBLIA) 10 % Lilibeth Apply 1 application topically once daily.    gabapentin (NEURONTIN) 300 MG capsule Take 2 capsules at night x 1 week. Then take 1 capsule QAM and 2 capsules at night thereafter. Increase as tolerated.    hydrocodone-acetaminophen 5-325mg (NORCO) 5-325 mg per tablet Take 1 tablet by mouth 2 (two) times daily as needed for Pain.    lisinopril-hydrochlorothiazide (PRINZIDE,ZESTORETIC) 20-12.5 mg per tablet     MV-MN/FA/LYCOPENE/LUT/HB#178 (HUBERT MULTIVITAMIN FOR MEN ORAL) Take by mouth.    oxaprozin (DAYPRO) 600 mg tablet Take 2 tablets (1,200 mg total) by mouth once daily.    pravastatin (PRAVACHOL) 40 MG tablet     tizanidine (ZANAFLEX) 4 MG tablet TAKE 1 TABLET BY MOUTH NIGHTLY AS NEEDED    tramadol (ULTRAM) 50 mg tablet Take 1 tablet (50 mg total) by mouth every 6 (six) hours as needed.           Clinical Reference Information           Your Vitals Were     BP                   155/96           Blood Pressure          Most Recent Value    BP  (!)  155/96      Allergies as of 2/28/2017     Chocolate Flavor      Immunizations Administered on Date of Encounter - 2/28/2017     None      Orders Placed During Today's Visit     Future Labs/Procedures Expected by Expires    MRI Upper Extremity W Wo Contrast Right  2/28/2017 2/28/2018      [facility] Sign-Up     Activating your [facility] account is as easy as 1-2-3!     1) Visit [email or web address], select Sign Up Now, enter this activation code and your date of birth, then select Next.  [email or web address]  Expires: 3/24/2017  1:24 PM      2) Create a username and password to use when you visit [facility] in the future and select a security question in case you lose your password and select Next.    3) Enter your e-mail address and click Sign Up!    Additional Information  If you have questions, please e-mail [email or web address] or call [phone] to talk to our [facility]  staff. Remember, [facility] is NOT to be used for urgent needs. For medical emergencies, dial 911.         Language Assistance Services     ATTENTION: Language assistance services are available, free of charge. Please call [phone].      ATENCIÓN: Si habla maria, tiene a celaya disposición servicios gratuitos de asistencia lingüística. Llame al [phone].     CHÚ Ý: N?u b?n nói Ti?ng Vi?t, có các d?ch v? h? tr? ngôn ng? mi?n phí dành cho b?n. G?i s? [phone].         [facility] complies with applicable Federal civil rights laws and does not discriminate on the basis of race, color, national origin, age, disability, or sex.

## 2017-02-28 NOTE — PROGRESS NOTES
RHEUMATOLOGY CLINIC FOLLOW UP VISIT  Chief complaints:-  My hands hurt.     HPI:-  Cornel Lam a 46 y.o. pleasant male comes in for a follow up visit with above chief complaints. He has very complex past medical history including severe osteoarthritis of knee joint and multiple soft tissue injuries in the knee joints.  He was referred to me by our interventional pain specialist to rule out any underlying rheumatoid arthritis because he was having more pain in multiple joints associated with swelling.  He complained of pain and swelling over bilateral hands and ankles for the past year. He reports no change in the symptoms with the prednisone trial.   He denies any visual disturbances.  He denies any photosensitivity, malar rash, sicca syndrome or Raynaud's phenomenon.      Review of Systems   Constitutional: Positive for malaise/fatigue. Negative for chills and fever.   HENT: Negative for nosebleeds and sore throat.    Eyes: Negative for blurred vision, photophobia and redness.   Respiratory: Negative for cough, sputum production and shortness of breath.    Cardiovascular: Negative for chest pain and leg swelling.   Gastrointestinal: Negative for abdominal pain, constipation and diarrhea.   Genitourinary: Negative for dysuria, frequency and urgency.   Musculoskeletal: Positive for back pain, joint pain, myalgias and neck pain. Negative for falls.   Skin: Negative for itching and rash.   Neurological: Positive for weakness. Negative for dizziness, tremors, seizures, loss of consciousness and headaches.   Endo/Heme/Allergies: Negative for environmental allergies. Does not bruise/bleed easily.   Psychiatric/Behavioral: Negative for hallucinations and memory loss. The patient does not have insomnia.        Past Medical History:   Diagnosis Date    HBP (high blood pressure)        History reviewed. No pertinent surgical history.     Social History    Substance Use Topics    Smoking status: Never Smoker    Smokeless tobacco: Never Used    Alcohol use No       History reviewed. No pertinent family history.    Review of patient's allergies indicates:   Allergen Reactions    Chocolate flavor Swelling           Physical examination:-    Vitals:    02/28/17 1607   BP: (!) 155/96   Pulse: 65   Weight: 103.7 kg (228 lb 9.9 oz)   PainSc:   7   PainLoc: Generalized       Physical Exam   Constitutional: He is oriented to person, place, and time and well-developed, well-nourished, and in no distress. No distress.   HENT:   Head: Normocephalic and atraumatic.   Mouth/Throat: Oropharynx is clear and moist.   Eyes: Conjunctivae and EOM are normal. Pupils are equal, round, and reactive to light. Right eye exhibits no discharge. Left eye exhibits no discharge. No scleral icterus.   Neck: Normal range of motion. Neck supple.   Cardiovascular: Normal rate and intact distal pulses.    Pulmonary/Chest: Effort normal. No respiratory distress. He exhibits no tenderness.   Abdominal: Soft. There is no tenderness.   Musculoskeletal:   No synovitis in small joints of hands or feet.  No synovitis or effusion over large joint. Significant crepitus present  No sclerodactyly.  No telangiectasias.   Lymphadenopathy:     He has no cervical adenopathy.   Neurological: He is alert and oriented to person, place, and time. Gait normal.   Skin: Skin is warm. No rash noted. He is not diaphoretic. No erythema. No pallor.   Psychiatric: Mood, memory, affect and judgment normal.       Labs:-  Results for MEGGAN CRUZ (MRN [id number]) as of 2/28/2017 16:21   Ref. Range 1/31/2017 16:29   WBC Latest Ref Range: 3.90 - 12.70 K/uL 7.60   RBC Latest Ref Range: 4.60 - 6.20 M/uL 5.01   Hemoglobin Latest Ref Range: 14.0 - 18.0 g/dL 12.4 (L)   Hematocrit Latest Ref Range: 40.0 - 54.0 % 37.7 (L)   MCV Latest Ref Range: 82 - 98 fL 75 (L)   MCH Latest Ref Range: 27.0 - 31.0 pg 24.8 (L)   MCHC Latest  Ref Range: 32.0 - 36.0 % 32.9   RDW Latest Ref Range: 11.5 - 14.5 % 15.6 (H)   Platelets Latest Ref Range: 150 - 350 K/uL 224   MPV Latest Ref Range: 9.2 - 12.9 fL 9.7   Gran% Latest Ref Range: 38.0 - 73.0 % 53.9   Gran # Latest Ref Range: 1.8 - 7.7 K/uL 4.1   Lymph% Latest Ref Range: 18.0 - 48.0 % 22.6   Lymph # Latest Ref Range: 1.0 - 4.8 K/uL 1.7   Mono% Latest Ref Range: 4.0 - 15.0 % 7.1   Mono # Latest Ref Range: 0.3 - 1.0 K/uL 0.5   Eosinophil% Latest Ref Range: 0.0 - 8.0 % 15.5 (H)   Eos # Latest Ref Range: 0.0 - 0.5 K/uL 1.2 (H)   Basophil% Latest Ref Range: 0.0 - 1.9 % 0.9   Baso # Latest Ref Range: 0.00 - 0.20 K/uL 0.07   Iron Latest Ref Range: 45 - 160 ug/dL 87   TIBC Latest Ref Range: 250 - 450 ug/dL 367   Saturated Iron Latest Ref Range: 20 - 50 % 24   Transferrin Latest Ref Range: 200 - 375 mg/dL 248   Ferritin Latest Ref Range: 20.0 - 300.0 ng/mL 107   Sed Rate Latest Ref Range: 0 - 10 mm/Hr 4   Sodium Latest Ref Range: 136 - 145 mmol/L 142   Potassium Latest Ref Range: 3.5 - 5.1 mmol/L 3.7   Chloride Latest Ref Range: 95 - 110 mmol/L 103   CO2 Latest Ref Range: 23 - 29 mmol/L 28   Anion Gap Latest Ref Range: 8 - 16 mmol/L 11   BUN, Bld Latest Ref Range: 6 - 20 mg/dL 15   Creatinine Latest Ref Range: 0.5 - 1.4 mg/dL 1.3   eGFR if non African American Latest Ref Range: >60 mL/min/1.73 m^2 >60   eGFR if  Latest Ref Range: >60 mL/min/1.73 m^2 >60   Glucose Latest Ref Range: 70 - 110 mg/dL 98   Calcium Latest Ref Range: 8.7 - 10.5 mg/dL 9.5   Alkaline Phosphatase Latest Ref Range: 55 - 135 U/L 44 (L)   Total Protein Latest Ref Range: 6.0 - 8.4 g/dL 7.5   Albumin Latest Ref Range: 3.5 - 5.2 g/dL 3.9   Total Bilirubin Latest Ref Range: 0.1 - 1.0 mg/dL 0.2   AST Latest Ref Range: 10 - 40 U/L 25   ALT Latest Ref Range: 10 - 44 U/L 48 (H)   CRP Latest Ref Range: 0.0 - 8.2 mg/L 2.0   CCP Antibodies Latest Ref Range: <5.0 U/mL 0.7   Rheumatoid Factor Latest Ref Range: 0.0 - 15.0 IU/mL <10.0        Medication List with Changes/Refills   Current Medications    ACETAMINOPHEN (ARTHRITIS PAIN RELIEVER ORAL)    Take by mouth.    AMLODIPINE (NORVASC) 10 MG TABLET        EFINACONAZOLE (JUBLIA) 10 % ANITHA    Apply 1 application topically once daily.    GABAPENTIN (NEURONTIN) 300 MG CAPSULE    Take 2 capsules at night x 1 week. Then take 1 capsule QAM and 2 capsules at night thereafter. Increase as tolerated.    HYDROCODONE-ACETAMINOPHEN 5-325MG (NORCO) 5-325 MG PER TABLET    Take 1 tablet by mouth 2 (two) times daily as needed for Pain.    LISINOPRIL-HYDROCHLOROTHIAZIDE (PRINZIDE,ZESTORETIC) 20-12.5 MG PER TABLET        MV-MN/FA/LYCOPENE/LUT/HB#178 (HUBERT MULTIVITAMIN FOR MEN ORAL)    Take by mouth.    OXAPROZIN (DAYPRO) 600 MG TABLET    Take 2 tablets (1,200 mg total) by mouth once daily.    PRAVASTATIN (PRAVACHOL) 40 MG TABLET        TIZANIDINE (ZANAFLEX) 4 MG TABLET    TAKE 1 TABLET BY MOUTH NIGHTLY AS NEEDED    TRAMADOL (ULTRAM) 50 MG TABLET    Take 1 tablet (50 mg total) by mouth every 6 (six) hours as needed.   Discontinued Medications    PREDNISONE (DELTASONE) 10 MG TABLET    Take 20 mg daily for one week, then 15 mg daily next week, then 10 mg daily next week and 5 mg daily thereafter.       Assessment/Plans:-  # Polyarthralgia:-  Chronic mechanical and inflammatory pain over both small and large joints with features of severe DJD over large joints without any active synovitis over small joints. Labs failed to show any underlying autoimmune inflammatory arthritides . Xray images of hands where he hurts the most show only mild DJD. Will get MRI of right hand to rule even remote possibility of occult synovitis which is undetectable on exam. If MRI show no arthritides , would recommend treating for chronic pain syndrome.   - MRI Upper Extremity W Wo Contrast Right; Future     Time spent: 20 minutes in face to face discussion concerning diagnosis, prognosis, review of lab and test results, benefits of  treatment as well as management of disease, counseling of patient and coordination of care between various health care providers . Greater than half the time spent was used for coordination of care and counseling of patient.      Disclaimer: This note was prepared using voice recognition system and is likely to have sound alike errors and is not proof read.  Please call me with any questions.

## 2017-03-01 ENCOUNTER — OFFICE VISIT (OUTPATIENT)
Dept: PODIATRY | Facility: CLINIC | Age: 47
End: 2017-03-01
Payer: COMMERCIAL

## 2017-03-01 VITALS
DIASTOLIC BLOOD PRESSURE: 76 MMHG | WEIGHT: 227.63 LBS | HEART RATE: 64 BPM | RESPIRATION RATE: 18 BRPM | SYSTOLIC BLOOD PRESSURE: 123 MMHG | BODY MASS INDEX: 27.72 KG/M2 | HEIGHT: 76 IN

## 2017-03-01 DIAGNOSIS — M65.9 TENOSYNOVITIS OF FOOT: ICD-10-CM

## 2017-03-01 DIAGNOSIS — M25.572 SINUS TARSI SYNDROME, LEFT: Primary | ICD-10-CM

## 2017-03-01 DIAGNOSIS — M19.072 OSTEOARTHRITIS OF ANKLE AND FOOT, LEFT: ICD-10-CM

## 2017-03-01 PROCEDURE — 99213 OFFICE O/P EST LOW 20 MIN: CPT | Mod: 25,S$GLB,, | Performed by: PODIATRIST

## 2017-03-01 PROCEDURE — 1160F RVW MEDS BY RX/DR IN RCRD: CPT | Mod: S$GLB,,, | Performed by: PODIATRIST

## 2017-03-01 PROCEDURE — 20605 DRAIN/INJ JOINT/BURSA W/O US: CPT | Mod: S$GLB,,, | Performed by: PODIATRIST

## 2017-03-01 PROCEDURE — 99999 PR PBB SHADOW E&M-EST. PATIENT-LVL III: CPT | Mod: PBBFAC,,, | Performed by: PODIATRIST

## 2017-03-01 RX ORDER — DEXAMETHASONE SODIUM PHOSPHATE 4 MG/ML
2 INJECTION, SOLUTION INTRA-ARTICULAR; INTRALESIONAL; INTRAMUSCULAR; INTRAVENOUS; SOFT TISSUE ONCE
Status: COMPLETED | OUTPATIENT
Start: 2017-03-01 | End: 2017-03-01

## 2017-03-01 RX ADMIN — DEXAMETHASONE SODIUM PHOSPHATE 2 MG: 4 INJECTION, SOLUTION INTRA-ARTICULAR; INTRALESIONAL; INTRAMUSCULAR; INTRAVENOUS; SOFT TISSUE at 11:03

## 2017-03-01 NOTE — MR AVS SNAPSHOT
[facility]  [address]  [place] [address]  Phone: [phone]  Fax: [phone]                  Cornel Dick   3/1/2017 10:40 AM   Office Visit    Description:  Male : 1970   Provider:  Mabel Barillas DPM   Department:  [facility]           Reason for Visit     Follow-up           Diagnoses this Visit        Comments    Sinus tarsi syndrome, left    -  Primary     Osteoarthritis of ankle and foot, left         Tenosynovitis of foot                To Do List           Future Appointments        Provider Department Dept Phone    3/7/2017 2:45 PM [facility] [phone]    3/10/2017 2:00 PM [facility] PAIN1 [facility] - [phone]    3/15/2017 1:00 PM Wilson Bahena PA-C [facility] [phone]    3/20/2017 1:40 PM Magda Bynum PA-C [facility] [phone]    2017 10:20 AM Mabel Barillas DPM [facility] [phone]      Goals (5 Years of Data)     None      [facility]     [facility] Care Line - 24/7 Assistance  Registered nurses in the [facility] provide clinical advisement, health education, appointment booking, and other advisory services.  Call for this free service at [phone].             Medications           Message regarding Medications     Verify the changes and/or additions to your medication regime listed below are the same as discussed with your clinician today.  If any of these changes or additions are incorrect, please notify your healthcare provider.        These medications were administered today        Dose Freq    dexamethasone injection 2 mg 2 mg Once    Sig: Inject 0.5 mLs (2 mg total) into the articular space once.    Class: Normal    Route: Intra-articular           Verify that the below list of medications is an accurate representation of the medications you are currently taking.  If none reported, the list may  be blank. If incorrect, please contact your healthcare provider. Carry this list with you in case of emergency.           Current Medications     ACETAMINOPHEN (ARTHRITIS PAIN RELIEVER ORAL) Take by mouth.    amlodipine (NORVASC) 10 MG tablet     efinaconazole (JUBLIA) 10 % Lilibeth Apply 1 application topically once daily.    gabapentin (NEURONTIN) 300 MG capsule Take 2 capsules at night x 1 week. Then take 1 capsule QAM and 2 capsules at night thereafter. Increase as tolerated.    hydrocodone-acetaminophen 5-325mg (NORCO) 5-325 mg per tablet Take 1 tablet by mouth 2 (two) times daily as needed for Pain.    lisinopril-hydrochlorothiazide (PRINZIDE,ZESTORETIC) 20-12.5 mg per tablet     MV-MN/FA/LYCOPENE/LUT/HB#178 (HUBERT MULTIVITAMIN FOR MEN ORAL) Take by mouth.    oxaprozin (DAYPRO) 600 mg tablet Take 2 tablets (1,200 mg total) by mouth once daily.    pravastatin (PRAVACHOL) 40 MG tablet     tizanidine (ZANAFLEX) 4 MG tablet TAKE 1 TABLET BY MOUTH NIGHTLY AS NEEDED    tramadol (ULTRAM) 50 mg tablet Take 1 tablet (50 mg total) by mouth every 6 (six) hours as needed.           Clinical Reference Information           Your Vitals Were     BP                   123/76           Blood Pressure          Most Recent Value    BP  123/76      Allergies as of 3/1/2017     Chocolate Flavor      Immunizations Administered on Date of Encounter - 3/1/2017     None      Administrations This Visit     dexamethasone injection 2 mg     Admin Date Action Dose Route Administered By             03/01/2017 Given 2 mg Intra-articular Tim Burdick LPN                      [facility] Sign-Up     Activating your [facility] account is as easy as 1-2-3!     1) Visit [email or web address], select Sign Up Now, enter this activation code and your date of birth, then select Next.  [email or web address]  Expires: 3/24/2017  1:24 PM      2) Create a username and password to use when you visit [facility] in the future and select a security question in case you lose  your password and select Next.    3) Enter your e-mail address and click Sign Up!    Additional Information  If you have questions, please e-mail [email or web address] or call [phone] to talk to our [facility] staff. Remember, [facility] is NOT to be used for urgent needs. For medical emergencies, dial 911.         Language Assistance Services     ATTENTION: Language assistance services are available, free of charge. Please call [phone].      ATENCIÓN: Si habla español, tiene a celaya disposición servicios gratuitos de asistencia lingüística. Llame al [phone].     CHÚ Ý: N?u b?n nói Ti?ng Vi?t, có các d?ch v? h? tr? ngôn ng? mi?n phí dành cho b?n. G?i s? [phone].         [facility] complies with applicable Federal civil rights laws and does not discriminate on the basis of race, color, national origin, age, disability, or sex.

## 2017-03-01 NOTE — PROGRESS NOTES
Subjective:      Patient ID: Cornel Dick is a 46 y.o. male.    Chief Complaint: Follow-up (1 month post injection in left foot// Dr. Beard 01/2017)    Cornel Dick is a 46 y.o. year-old male following up for left ankle and subtalar joint pain. Patient now rates pain as 7/10 pins and needles  sharp, aching, throbbing, stabbing and shooting that has improved in some ways and worsened in others. The pain is worse with pressure, increased ambulation, prolonged standing and some shoe gear. The patient also brings with him his pain log which she has been keeping sinced early January of this year following his back injection.  He also has with him his recommended new balance shoes and new balance cushion insoles.   According to his pain log following subtalar injection last month he had improvement of pain to 4-5 out of 10 but the pain gradually returned.  He does feel that the pain in that area has improved overall but continues to have worsening pain now along the ankle joint.  Following his back injection he did not notice any improvement to his pain in his foot knees or back.      Review of Systems   Constitution: Negative for chills and fever.   Cardiovascular: Negative for chest pain.   Respiratory: Negative for shortness of breath.    Gastrointestinal: Negative for nausea and vomiting.           Objective:      Physical Exam   Constitutional: He is oriented to person, place, and time. He appears well-developed and well-nourished. No distress.   Cardiovascular:   Pulses:       Dorsalis pedis pulses are 2+ on the right side, and 2+ on the left side.        Posterior tibial pulses are 2+ on the right side, and 2+ on the left side.   Capillary fill time 3 seconds to digits bilateral feet.   Musculoskeletal:   Lower extremities:    Deformities: None    Normal arch height and rectus feet bilaterally.     5/5 muscle strength and tone in all four quadrants bilaterally.     Pain with mils guarding on range of  motion in all four quadrants WNL left.   Pain-free range of motion in all four quadrants WNL right.    Pain on palpation: Sinus tarsi and medial lateral and anterior ankle gutter on the left.     Neurological: He is alert and oriented to person, place, and time. He displays no Babinski's sign on the right side. He displays no Babinski's sign on the left side.   Plantar protective threshold sensation by touch via 5.07 SWMF present bilaterally.    Skin:   Lower extremities:    Normal turgor, texture, temperature bilaterally. Digital hair present bilaterally. Warm equally bilaterally.    Mild left ankle and sinus tarsi edema.    No varicosities, pigmentary changes.    No wounds, drainage, malodor, erythema, interdigital maceration were noted.  Scaling in a moccasin distribution.     Nails are thick discolored dystrophic on the left first and fifth digits.   Psychiatric: He has a normal mood and affect.   Nursing note and vitals reviewed.            Assessment:       Encounter Diagnoses   Name Primary?    Sinus tarsi syndrome, left Yes    Osteoarthritis of ankle and foot, left     Tenosynovitis of foot          Plan:       Cornel was seen today for follow-up.    Diagnoses and all orders for this visit:    Sinus tarsi syndrome, left    Osteoarthritis of ankle and foot, left    Tenosynovitis of foot    Other orders  -     dexamethasone injection 2 mg; Inject 0.5 mLs (2 mg total) into the articular space once.      I counseled the patient on his conditions, their implications and medical management.    At this time his pain log was reviewed and at that time he agreed to go ahead and try serial injections now along the ankle joint.    Injection was administered to the left anteromedial ankle with a mixture of 1 ml 1% lidocaine plain and .5ml dexamethasone phosphate. The patient was also guided on the use of anti-inflammatories for pain and inflammation to be discontinued if any stomach irritation occurs.    He agreed to  take it easy over the next 3 days and will continue with the recommended tennis shoes an ankle brace which he has with him.  He will follow-up in 1 month to monitor progress or call if any problems arise.  .

## 2017-03-06 ENCOUNTER — TELEPHONE (OUTPATIENT)
Dept: RADIOLOGY | Facility: HOSPITAL | Age: 47
End: 2017-03-06

## 2017-03-07 ENCOUNTER — HOSPITAL ENCOUNTER (OUTPATIENT)
Dept: RADIOLOGY | Facility: HOSPITAL | Age: 47
Discharge: HOME | End: 2017-03-07
Attending: INTERNAL MEDICINE
Payer: COMMERCIAL

## 2017-03-07 DIAGNOSIS — M25.50 POLYARTHRALGIA: ICD-10-CM

## 2017-03-07 PROCEDURE — 73220 MRI UPPR EXTREMITY W/O&W/DYE: CPT | Mod: 26,RT,, | Performed by: RADIOLOGY

## 2017-03-07 PROCEDURE — 25500020 PHARM REV CODE 255: Mod: PO | Performed by: INTERNAL MEDICINE

## 2017-03-07 PROCEDURE — A9585 GADOBUTROL INJECTION: HCPCS | Mod: PO | Performed by: INTERNAL MEDICINE

## 2017-03-07 PROCEDURE — 73220 MRI UPPR EXTREMITY W/O&W/DYE: CPT | Mod: TC,PO,RT

## 2017-03-07 RX ORDER — GADOBUTROL 604.72 MG/ML
10 INJECTION INTRAVENOUS
Status: COMPLETED | OUTPATIENT
Start: 2017-03-07 | End: 2017-03-07

## 2017-03-07 RX ADMIN — GADOBUTROL 10 ML: 604.72 INJECTION INTRAVENOUS at 04:03

## 2017-03-08 ENCOUNTER — TELEPHONE (OUTPATIENT)
Dept: RHEUMATOLOGY | Facility: CLINIC | Age: 47
End: 2017-03-08

## 2017-03-08 DIAGNOSIS — M15.9 PRIMARY OSTEOARTHRITIS INVOLVING MULTIPLE JOINTS: Primary | ICD-10-CM

## 2017-03-08 RX ORDER — CELECOXIB 200 MG/1
200 CAPSULE ORAL 2 TIMES DAILY PRN
Qty: 60 CAPSULE | Refills: 3 | Status: SHIPPED | OUTPATIENT
Start: 2017-03-08 | End: 2017-04-17 | Stop reason: ALTCHOICE

## 2017-03-08 NOTE — TELEPHONE ENCOUNTER
----- Message from Luis Reyes MD sent at 3/8/2017  8:28 AM CST -----  MRI ruled out rheumatoid arthritis. No evidence of rheumatoid arthritis seen . It shows osteoarthritis as expected. Since the MRI failed to show RA, he will not benefit from any of the immunosuppressive therapies. Continue follow up with pain clinic specialist . Please call us with any questions.

## 2017-03-08 NOTE — TELEPHONE ENCOUNTER
Advised patient of below, pt verbalized understanding. States that he is still taking 10 mg of prednisone because without it his hands hurt really bad. Please Advise.

## 2017-03-10 ENCOUNTER — HOSPITAL ENCOUNTER (OUTPATIENT)
Dept: RADIOLOGY | Facility: HOSPITAL | Age: 47
Discharge: HOME | End: 2017-03-10
Attending: ANESTHESIOLOGY
Payer: COMMERCIAL

## 2017-03-10 ENCOUNTER — SURGERY (OUTPATIENT)
Age: 47
End: 2017-03-10

## 2017-03-10 ENCOUNTER — HOSPITAL ENCOUNTER (OUTPATIENT)
Facility: HOSPITAL | Age: 47
Discharge: HOME | End: 2017-03-10
Attending: ANESTHESIOLOGY | Admitting: ANESTHESIOLOGY
Payer: COMMERCIAL

## 2017-03-10 VITALS
RESPIRATION RATE: 14 BRPM | HEART RATE: 63 BPM | DIASTOLIC BLOOD PRESSURE: 104 MMHG | OXYGEN SATURATION: 100 % | BODY MASS INDEX: 26.18 KG/M2 | WEIGHT: 215 LBS | HEIGHT: 76 IN | SYSTOLIC BLOOD PRESSURE: 169 MMHG

## 2017-03-10 DIAGNOSIS — M54.16 BILATERAL LUMBAR RADICULOPATHY: ICD-10-CM

## 2017-03-10 PROCEDURE — 25000003 PHARM REV CODE 250

## 2017-03-10 PROCEDURE — 64483 NJX AA&/STRD TFRM EPI L/S 1: CPT | Mod: RT,,, | Performed by: ANESTHESIOLOGY

## 2017-03-10 PROCEDURE — 25000003 PHARM REV CODE 250: Performed by: ANESTHESIOLOGY

## 2017-03-10 PROCEDURE — 64484 NJX AA&/STRD TFRM EPI L/S EA: CPT

## 2017-03-10 PROCEDURE — 64484 NJX AA&/STRD TFRM EPI L/S EA: CPT | Mod: RT,,, | Performed by: ANESTHESIOLOGY

## 2017-03-10 PROCEDURE — 63600175 PHARM REV CODE 636 W HCPCS

## 2017-03-10 PROCEDURE — 63600175 PHARM REV CODE 636 W HCPCS: Performed by: ANESTHESIOLOGY

## 2017-03-10 PROCEDURE — 64483 NJX AA&/STRD TFRM EPI L/S 1: CPT

## 2017-03-10 RX ORDER — DEXAMETHASONE SODIUM PHOSPHATE 4 MG/ML
INJECTION, SOLUTION INTRA-ARTICULAR; INTRALESIONAL; INTRAMUSCULAR; INTRAVENOUS; SOFT TISSUE
Status: DISCONTINUED | OUTPATIENT
Start: 2017-03-10 | End: 2017-03-10 | Stop reason: HOSPADM

## 2017-03-10 RX ORDER — LIDOCAINE HYDROCHLORIDE 20 MG/ML
INJECTION, SOLUTION INFILTRATION; PERINEURAL
Status: DISCONTINUED | OUTPATIENT
Start: 2017-03-10 | End: 2017-03-10 | Stop reason: HOSPADM

## 2017-03-10 RX ADMIN — DEXAMETHASONE SODIUM PHOSPHATE 20 MG: 4 INJECTION, SOLUTION INTRA-ARTICULAR; INTRALESIONAL; INTRAMUSCULAR; INTRAVENOUS; SOFT TISSUE at 01:03

## 2017-03-10 RX ADMIN — LIDOCAINE HYDROCHLORIDE 10 ML: 20 INJECTION, SOLUTION INFILTRATION; PERINEURAL at 01:03

## 2017-03-10 NOTE — PLAN OF CARE
Problem: Patient Care Overview  Goal: Plan of Care Review  Outcome: Outcome(s) achieved Date Met:  03/10/17  Patient d/c home in stable condition via wheelchair with ride. Verbalized understanding of d/c instructions. Patient voiced no complaints at this time. Patient stood at side of bed, walked steps with no new motor deficits. Neurologically intact.

## 2017-03-10 NOTE — OP NOTE
"Procedure: Lumbar Transforaminal Epidural Steroid Injection under Fluorsocopic Guidance (supraneural approach)    Level: L3/4 and L5/S1    Side: Right    PROCEDURE DATE: 3/10/2017    Pre-operative Diagnosis: Lumbar Radiculopathy  Post-operative Diagnosis: Lumbar Radiculopathy    Provider: Greg Puente MD  Assistant(s): None    Anesthesia: Local, No Sedation    >> 0 mg of VERSED    >> 0 mcg of FENTANYL     Indication: Low back pain with radiculopathy consistent with distribution of targeted nerve. Symptoms unresponsive to conservative treatments. Fluoroscopy was used to optimize visualization of needle placement and to maximize safety.     Procedure Description / Technique:  The patient was seen and identified in the preoperative area. Risks, benefits, complications, and alternatives were discussed with the patient. The patient agreed to proceed with the procedure and signed the consent. The site and side of the procedure was identified and marked. An IV was not placed for this procedure. The patient was taken to the procedural suite.    The patient was positioned in prone orientation on procedure table and a pillow was placed under the abdomen to reduce lumbar lordosis. A time out was performed prior to any intervention. The procedure, site, side, and allergies were stated and agreed to by all present. The lumbosacral area was widely prepped with ChloraPrep. The procedural site was draped in usual sterile fashion. Vital signs were closely monitored throughout this procedure. Conscious sedation was not used for this procedure.    The target area was visualized under fluoroscopy. The cephalocaudal angle of the fluoroscope was adjusted as to align the vertebral end plates. The fluoroscopic arm was rotated ipsilaterally to an angle of approximately 30 degrees until the "arben dog" outline came into view and the tip of the inferior superior articular process pointed towards the midline, 6:00 position of the above " "pedicle. A 25 gauge 3.5 inch spinal needle was directed towards the "chin" of the "arben dog" (adjacent to the pars interarticularis and inferior to the pedicle). The needle was advanced until OS was met at the inferior border of the pedicle / pars interface. The needle was adjusted so that it would pass inferior to the osseous border. The fluoroscope was then placed in the lateral position and the needle was slowly advanced until it rested in the posterior 1/3rd of the vertebral foramen. AP fluoroscopy was checked and the needle tip rested at the 6:00 position under the pedicle. No paresthesia was elicited during needle placement. With the needle tip in its final position, gentle aspiration was negative for blood and CSF. Omnipaque 240 (1 to 2 mL) was injected under live fluoroscopy. Microbore tubing was used for injection. There was no pain or paresthesia on injection. The contrast clearly delineated the targeted nerve root on AP fluoroscopy. No vascular uptake was seen. A solution containing 3 mL of 1% PF Lidocaine and 2 mL of Dexamethasone (10 mg/mL) was mixed and 2 mL was injected slowly at each level targeted. There was minimal resistance on injection. No pain or paresthesia was elicited on injection. The stylet was replaced and the needle was withdrawn intact. This procedure was performed for each of the above indicated levels.     Description of Findings: Not applicable    Prosthetic devices, grafts, tissues, or devices implanted: None    Specimen Removed: No    Estimated Blood Loss: minimal    COMPLICATIONS: None    DISPOSITION / PLANS: The patient was transferred to the recovery area in a stable condition for observation. The patient was reexamined prior to discharge. There was no evidence of acute neurologic injury following the procedure.  Patient was discharged from the recovery room after meeting discharge criteria. Home discharge instructions were given to the patient by the staff.    "

## 2017-03-10 NOTE — DISCHARGE SUMMARY
[facility]  Discharge Note       Description of Procedure: Lumbar Transforaminal Epidural Steroid Injection under Fluoroscopic Guidance    Procedure Date: 3/10/2017    Admit Date: 3/10/2017  Discharge Date: 3/10/2017     Attending Physician: Greg Puente   Discharge Provider: Greg Puente    Preoperative Diagnosis: Lumbar radiculopathy    Postoperative Diagnosis: as above, same as preoperative diagnosis    Discharged Condition: Stable    Hospital Course: Patient was admitted for an outpatient procedure. The procedure was tolerated well with no complications.    Final Diagnoses: Same as principal problem.    Disposition: Home, self-care.    Follow up/Patient Instructions:  Follow-up in clinic in 2-3 weeks.    Medications: No medications were prescribed today. The patient was advised to resume normal medication regimen without change.  Specific information was provided regarding restarting any anticoagulant/s.    Discharge Procedure Orders (must include Diet, Follow-up, Activity):  Light activity for the remainder of the day, resume normal activity tomorrow. Resume normal diet. Follow-up in clinic in 2-3 weeks.

## 2017-03-11 DIAGNOSIS — M23.91 INTERNAL DERANGEMENT OF KNEE JOINT, RIGHT: ICD-10-CM

## 2017-03-11 DIAGNOSIS — M47.816 SPONDYLOSIS OF LUMBAR REGION WITHOUT MYELOPATHY OR RADICULOPATHY: ICD-10-CM

## 2017-03-11 DIAGNOSIS — M17.32 POST-TRAUMATIC OSTEOARTHRITIS OF LEFT KNEE: ICD-10-CM

## 2017-03-11 DIAGNOSIS — M51.36 DDD (DEGENERATIVE DISC DISEASE), LUMBAR: ICD-10-CM

## 2017-03-11 DIAGNOSIS — M17.12 ARTHRITIS OF KNEE, LEFT: ICD-10-CM

## 2017-03-12 RX ORDER — OXAPROZIN 600 MG/1
TABLET, FILM COATED ORAL
Qty: 60 TABLET | Refills: 0 | Status: SHIPPED | OUTPATIENT
Start: 2017-03-12 | End: 2017-04-17 | Stop reason: SDUPTHER

## 2017-03-15 ENCOUNTER — OFFICE VISIT (OUTPATIENT)
Dept: ORTHOPEDICS | Facility: CLINIC | Age: 47
End: 2017-03-15
Payer: COMMERCIAL

## 2017-03-15 VITALS
SYSTOLIC BLOOD PRESSURE: 143 MMHG | HEIGHT: 76 IN | DIASTOLIC BLOOD PRESSURE: 84 MMHG | WEIGHT: 232.38 LBS | HEART RATE: 69 BPM | BODY MASS INDEX: 28.3 KG/M2

## 2017-03-15 DIAGNOSIS — M71.21 BAKER'S CYST OF KNEE, RIGHT: ICD-10-CM

## 2017-03-15 DIAGNOSIS — S83.241A ACUTE MEDIAL MENISCUS TEAR OF RIGHT KNEE, INITIAL ENCOUNTER: Primary | ICD-10-CM

## 2017-03-15 DIAGNOSIS — M25.561 RIGHT KNEE PAIN, UNSPECIFIED CHRONICITY: ICD-10-CM

## 2017-03-15 PROCEDURE — 99212 OFFICE O/P EST SF 10 MIN: CPT | Mod: S$GLB,,, | Performed by: PHYSICIAN ASSISTANT

## 2017-03-15 PROCEDURE — 99999 PR PBB SHADOW E&M-EST. PATIENT-LVL III: CPT | Mod: PBBFAC,,, | Performed by: PHYSICIAN ASSISTANT

## 2017-03-15 PROCEDURE — 1160F RVW MEDS BY RX/DR IN RCRD: CPT | Mod: S$GLB,,, | Performed by: PHYSICIAN ASSISTANT

## 2017-03-15 NOTE — PROGRESS NOTES
Subjective:      Patient ID: Cornel Dick is a 46 y.o. male.    Chief Complaint: Pain of the Right Knee    HPI   The patient presents to clinic for follow-up of his right knee. He states that his pain is somewhat better. The patient states he is still taking his daypro and going to PT at [facility].   He has a confirmed medial meniscus tear and a small baker's cyst. He rates his current discomfort a 6/10.      Review of Systems   Constitution: Negative for chills and fever.   Gastrointestinal: Negative for abdominal pain, diarrhea, nausea and vomiting.         Objective:                      Right Knee Exam     Inspection   Effusion: effusion  Deformity: deformity  Bruising: absent    Tenderness   The patient is tender to palpation of the condyle, lateral joint line, lateral retinaculum, medial joint line, medial retinaculum, patella and pes anserinus.    Range of Motion   Extension: 5   Flexion: 110     Tests   Meniscus   Mariana:  Medial - positive   Ligament Examination Lachman: normal (-1 to 2mm) PCL-Posterior Drawer: normal (0 to 2mm)                 Assessment:       Encounter Diagnoses   Name Primary?    Acute medial meniscus tear of right knee, initial encounter Yes    Right knee pain, unspecified chronicity     Baker's cyst of knee, right           Plan:       Cornel was seen today for pain.    Diagnoses and all orders for this visit:    Acute medial meniscus tear of right knee, initial encounter    Right knee pain, unspecified chronicity    Baker's cyst of knee, right      The patient would like to resume  outpatient physical therapy prior to his surgery to increase range of motion and strengthening of his knee.  He would like to be scheduled for an outpatient knee arthroscopy to address the above findings within 4-6 weeks.  I informed the patient that someone from my office will call him and notify him about a surgery date.  The patient understands the material risks of surgery, what is  involved and desires to proceed.  He will continue taking his anti-inflammatory on a daily basis but is aware that he will need to stop it 7 days prior to her surgery.

## 2017-03-16 ENCOUNTER — TELEPHONE (OUTPATIENT)
Dept: ORTHOPEDICS | Facility: CLINIC | Age: 47
End: 2017-03-16

## 2017-03-16 NOTE — TELEPHONE ENCOUNTER
Contacted pt regarding scheduled right knee arthroscope.  Pt offered date of 4/19/17.  Pt requested to return call on tomorrow after speaking with wife.

## 2017-03-17 ENCOUNTER — TELEPHONE (OUTPATIENT)
Dept: ORTHOPEDICS | Facility: CLINIC | Age: 47
End: 2017-03-17

## 2017-03-17 DIAGNOSIS — M47.816 SPONDYLOSIS OF LUMBAR REGION WITHOUT MYELOPATHY OR RADICULOPATHY: ICD-10-CM

## 2017-03-17 DIAGNOSIS — M54.16 LUMBAR RADICULITIS: ICD-10-CM

## 2017-03-17 RX ORDER — GABAPENTIN 300 MG/1
CAPSULE ORAL
Qty: 90 CAPSULE | Refills: 2 | Status: SHIPPED | OUTPATIENT
Start: 2017-03-17 | End: 2017-03-20 | Stop reason: SDUPTHER

## 2017-03-17 NOTE — TELEPHONE ENCOUNTER
----- Message from Magda Bynum PA-C sent at 3/17/2017  9:50 AM CDT -----  Contact: pt  Done.  ----- Message -----     From: Chasidy Roberts MA     Sent: 3/17/2017   9:46 AM       To: Magda Bynum PA-C        ----- Message -----     From: Gray Aguilar     Sent: 3/17/2017   9:36 AM       To: Misa FONSECA Staff    He's calling in regards to a RX refill for: Gabapentin 300 mg, [facility] in [place], please advise, [phone] (cell)

## 2017-03-20 ENCOUNTER — OFFICE VISIT (OUTPATIENT)
Dept: PAIN MEDICINE | Facility: CLINIC | Age: 47
End: 2017-03-20
Payer: COMMERCIAL

## 2017-03-20 VITALS
SYSTOLIC BLOOD PRESSURE: 140 MMHG | DIASTOLIC BLOOD PRESSURE: 81 MMHG | WEIGHT: 232 LBS | BODY MASS INDEX: 28.25 KG/M2 | HEART RATE: 65 BPM | HEIGHT: 76 IN

## 2017-03-20 DIAGNOSIS — M54.16 LUMBAR RADICULITIS: ICD-10-CM

## 2017-03-20 DIAGNOSIS — M51.36 DDD (DEGENERATIVE DISC DISEASE), LUMBAR: ICD-10-CM

## 2017-03-20 DIAGNOSIS — M47.817 LUMBOSACRAL SPONDYLOSIS WITHOUT MYELOPATHY: ICD-10-CM

## 2017-03-20 DIAGNOSIS — M54.16 LUMBAR RADICULOPATHY: Primary | ICD-10-CM

## 2017-03-20 DIAGNOSIS — M47.816 SPONDYLOSIS OF LUMBAR REGION WITHOUT MYELOPATHY OR RADICULOPATHY: ICD-10-CM

## 2017-03-20 PROCEDURE — 1160F RVW MEDS BY RX/DR IN RCRD: CPT | Mod: S$GLB,,, | Performed by: PHYSICIAN ASSISTANT

## 2017-03-20 PROCEDURE — 99214 OFFICE O/P EST MOD 30 MIN: CPT | Mod: S$GLB,,, | Performed by: PHYSICIAN ASSISTANT

## 2017-03-20 PROCEDURE — 99999 PR PBB SHADOW E&M-EST. PATIENT-LVL III: CPT | Mod: PBBFAC,,, | Performed by: PHYSICIAN ASSISTANT

## 2017-03-20 RX ORDER — HYDROCODONE BITARTRATE AND ACETAMINOPHEN 5; 325 MG/1; MG/1
1 TABLET ORAL 2 TIMES DAILY PRN
Qty: 60 TABLET | Refills: 0 | Status: SHIPPED | OUTPATIENT
Start: 2017-03-20 | End: 2017-04-25 | Stop reason: SDUPTHER

## 2017-03-20 RX ORDER — GABAPENTIN 400 MG/1
CAPSULE ORAL
Qty: 90 CAPSULE | Refills: 2 | Status: SHIPPED | OUTPATIENT
Start: 2017-03-20 | End: 2017-06-19 | Stop reason: SDUPTHER

## 2017-03-20 NOTE — PROGRESS NOTES
Chief Pain Complaint:  Low Back Pain, Bilateral Leg Pain (R>L)    History of Present Illness:  This patient is a 46 y.o. male who presents today complaining of the above noted pain/s. The patient describes the pain as follows.    - duration of pain: ~ 3 years   - timing: intermittent   - character: aching, shooting  - radiating, dermatomal: extends into bilateral lower extremities across dermatomes, R>L  - antecedent trauma, prior spinal surgery: no prior trauma, no prior spinal surgery   - pertinent negatives: No fever, No chills, No weight loss, No bladder dysfunction, No bowel dysfunction, No saddle anesthesia  - pertinent positives: generalized nonspecific Lower Extremity weakness bilaterally    - medications, other therapies tried (physical therapy, injections):     >> gabapentin, zanaflex, Tramadol, norco    >> Has previously undergone Physical Therapy    >> Has previously undergone spinal injection/s: right L3/L4, L5/S1 TF KAVITA on 1-13-17 and 3-10-17 with only about 1 week relief after both injections      Imaging / Labs / Studies (reviewed on 3/20/2017):    11/16/16 MRI Lumbar Spine Without Contrast    Narrative Technique:  Multiplanar, multisequence MR images were performed of the lumbar spine obtained without contrast.   Comparison: None.   Results:  Lumbar spine alignment is within normal limits. The vertebral body heights are well maintained, with no fracture.  No marrow signal abnormality suspicious for an infiltrative process.    The conus medullaris terminates at approximately the L1-L2 disk space.  The adjacent soft tissue structures show no significant abnormalities.  There is mild disc desiccation at the L3-L4 and L5-S1 discs.  Minimal disc space narrowing noted at these levels.  L1-L2: No significant central canal or neural foraminal narrowing.  L2-L3: No significant central canal or neural foraminal narrowing.  L3-L4: There is a broad-based right foraminal disc extrusion that results in moderate  "effacement of the exiting L3 nerve root.  No significant central canal narrowing.  L4-L5:  No significant central canal or neural foraminal narrowing.  L5-S1:  Broad-based right paracentral/right foraminal disc protrusion resulting in no significant central canal narrowing.  The right neural foraminal canal is mildly to moderately narrowed.  There is abutment of the exiting L5 nerve root.  No significant effacement of this nerve root.       5/13/16 X-Ray Lumbar Spine Complete 5 View    Narrative Five view lumbar spine.  Findings: Spinal alignment is anatomic.  Mild disc space narrowing and facet arthropathy at L5-S1.  Pedicles appear intact.  No compression fracture or subluxation.       Review of Systems:  CONSTITUTIONAL: patient denies any fever, chills, or weight loss  SKIN: patient denies any rash or itching  RESPIRATORY: patient denies having any shortness of breath  GASTROINTESTINAL: patient reports constipation  GENITOURINARY: patient denies having any abnormal bladder function    MUSCULOSKELETAL:  - patient complains of the above noted pain/s (see chief pain complaint)    NEUROLOGICAL:   - pain as above  - strength in Lower extremities is decreased, BILATERALLY  - sensation in Lower extremities is abnormal, BILATERALLY  - patient denies any loss of bowel or bladder control      PSYCHIATRIC: patient denies any change in mood      Physical Exam:    Vitals:  BP (!) 140/81  Pulse 65  Ht 6' 4" (1.93 m)  Wt 105.2 kg (232 lb)  BMI 28.24 kg/m2   (reviewed on 3/20/2017)    General: alert and oriented, in no apparent distress  Gait: antalgic gait, uses a cane to assist ambulation  Skin: No rashes, No discoloration, No obvious lesions  HEENT: EOMI  Respiratory: respirations nonlabored    Musculoskeletal:  - Any pain on flexion, extension, rotation:    >> pain on extension and rotation  - Straight Leg Raise:     >> LEFT :: negative    >> RIGHT :: negative  - Any tenderness to palpation across paraspinal muscles, " "joints, bursae:     >> across lumbar paraspinals    Neuro:  - Extremity Strength:     >> LEFT :: decreased throughout, worse with dorsiflexion/ plantar flexion    >> RIGHT :: 5/5  - Extremity Reflexes:    >> LEFT  :: 2+    >> RIGHT :: 2+  - Sensory (sensation to light touch):    >> LEFT :: intact    >> RIGHT :: intact     Psych:  Mood and affect is appropriate      Assessment:  Lumbar Radiculopathy  Lumbar Spondylosis   Lumbar DDD      Plan:  Patient returns for follow-up. He has bilateral leg pain (R>L) across dermatomes, and he has low back pain as well.  MRI shows DDD at L3/4 and L5/S1 with right side extension and encroachment of nerve roots, NF narrowing, and facet disease as well. There seems to be no indication for left side radicular pain based on the MRI. He has [place] [facility] injections (records attached into "Media").   - S/p right L3/L4, L5/S1 TF KAVITA on 1-13-17 and 3-10-17 with only about 1 week relief after both injections.   - Will order spinal cord stimulator trial. The patient has been very reading about it and is very interested. Psych referral placed in the computer.  - Refill Norco 5mg #60 PRN, which he states he is only taking at night. He also has Rx of tramadol that he takes during the day.  - Increase gabapentin 900mg/day (300mg QAM, 600mg QHS) to 1200mg/ day (400mg QAM, 800mg QHS).  - [place]  only show Rx of tramadol #50 on 3-3-17 (refill from [facility]) and [place] 5mg #60 from Dr. Puente on 2-7-17.  RTC after SCS trial if needed. I discussed the risks, benefits, and alternatives to potential treatment options. All questions and concerns were fully addressed today in clinic. Dr. Puente was consulted regarding the patient plan and agrees.             >>Pain Disability Questionnaire:  12/20/2016 :: 83    >>Opioid Risk Tool:  12/20/2016 :: 1    >>UDS:  12/20/2016 :: negative (at initial consult)    >> Pain Disability Index:  2/6/2017 " :: 39  3/20/2017 :: 43

## 2017-03-21 ENCOUNTER — PATIENT MESSAGE (OUTPATIENT)
Dept: PAIN MEDICINE | Facility: CLINIC | Age: 47
End: 2017-03-21

## 2017-03-22 DIAGNOSIS — M51.36 DDD (DEGENERATIVE DISC DISEASE), LUMBAR: ICD-10-CM

## 2017-03-22 DIAGNOSIS — Z01.818 PRE-OPERATIVE CLEARANCE: Primary | ICD-10-CM

## 2017-03-22 NOTE — PROGRESS NOTES
Patient would like to go to [facility] for clearance for SCS trial to expedite process. Will fax over referral today.

## 2017-03-26 DIAGNOSIS — M71.21 BAKER'S CYST OF KNEE, RIGHT: ICD-10-CM

## 2017-03-26 DIAGNOSIS — M17.12 OSTEOARTHRITIS OF LEFT KNEE, UNSPECIFIED OSTEOARTHRITIS TYPE: ICD-10-CM

## 2017-03-26 DIAGNOSIS — S83.241A ACUTE MEDIAL MENISCUS TEAR OF RIGHT KNEE, INITIAL ENCOUNTER: ICD-10-CM

## 2017-04-03 DIAGNOSIS — M79.18 MYOFASCIAL PAIN: ICD-10-CM

## 2017-04-03 RX ORDER — TIZANIDINE 4 MG/1
TABLET ORAL
Qty: 30 TABLET | Refills: 0 | OUTPATIENT
Start: 2017-04-03

## 2017-04-05 ENCOUNTER — PATIENT MESSAGE (OUTPATIENT)
Dept: ADMINISTRATIVE | Facility: OTHER | Age: 47
End: 2017-04-05

## 2017-04-05 ENCOUNTER — OFFICE VISIT (OUTPATIENT)
Dept: PODIATRY | Facility: CLINIC | Age: 47
End: 2017-04-05
Payer: COMMERCIAL

## 2017-04-05 VITALS
DIASTOLIC BLOOD PRESSURE: 98 MMHG | HEART RATE: 69 BPM | SYSTOLIC BLOOD PRESSURE: 151 MMHG | BODY MASS INDEX: 28.23 KG/M2 | RESPIRATION RATE: 18 BRPM | HEIGHT: 76 IN | WEIGHT: 231.81 LBS

## 2017-04-05 DIAGNOSIS — M19.072 OSTEOARTHRITIS OF ANKLE AND FOOT, LEFT: ICD-10-CM

## 2017-04-05 DIAGNOSIS — M25.572 SINUS TARSI SYNDROME, LEFT: Primary | ICD-10-CM

## 2017-04-05 DIAGNOSIS — M65.9 TENOSYNOVITIS OF FOOT: ICD-10-CM

## 2017-04-05 DIAGNOSIS — M79.18 MYOFASCIAL PAIN: ICD-10-CM

## 2017-04-05 PROCEDURE — 99213 OFFICE O/P EST LOW 20 MIN: CPT | Mod: 25,S$GLB,, | Performed by: PODIATRIST

## 2017-04-05 PROCEDURE — 99999 PR PBB SHADOW E&M-EST. PATIENT-LVL III: CPT | Mod: PBBFAC,,, | Performed by: PODIATRIST

## 2017-04-05 PROCEDURE — 1160F RVW MEDS BY RX/DR IN RCRD: CPT | Mod: S$GLB,,, | Performed by: PODIATRIST

## 2017-04-05 RX ORDER — TIZANIDINE 4 MG/1
4 TABLET ORAL NIGHTLY PRN
Qty: 30 TABLET | Refills: 5 | Status: SHIPPED | OUTPATIENT
Start: 2017-04-05 | End: 2017-09-29 | Stop reason: SDUPTHER

## 2017-04-05 NOTE — MR AVS SNAPSHOT
[facility]  [address]  [place] [address]  Phone: [phone]  Fax: [phone]                  Cornel Dick   2017 10:20 AM   Office Visit    Description:  Male : 1970   Provider:  Mabel Barillas DPM   Department:  [facility]           Reason for Visit     Follow-up     Ankle Pain                To Do List           Goals (5 Years of Data)     None      [facility]     [facility] Care Line -  Assistance  Unless otherwise directed by your provider, please contact [facility], our nurse care line that is available for  assistance.     Registered nurses in the [facility] provide: appointment scheduling, clinical advisement, health education, and other advisory services.  Call: [phone] (toll free)               Medications           Message regarding Medications     Verify the changes and/or additions to your medication regime listed below are the same as discussed with your clinician today.  If any of these changes or additions are incorrect, please notify your healthcare provider.             Verify that the below list of medications is an accurate representation of the medications you are currently taking.  If none reported, the list may be blank. If incorrect, please contact your healthcare provider. Carry this list with you in case of emergency.           Current Medications     ACETAMINOPHEN (ARTHRITIS PAIN RELIEVER ORAL) Take by mouth.    amlodipine (NORVASC) 10 MG tablet     celecoxib (CELEBREX) 200 MG capsule Take 1 capsule (200 mg total) by mouth 2 (two) times daily as needed for Pain.    efinaconazole (JUBLIA) 10 % Lilibeth Apply 1 application topically once daily.    gabapentin (NEURONTIN) 400 MG capsule Ttake 1 capsule QAM and 2 capsules at night.    hydrocodone-acetaminophen 5-325mg (NORCO) 5-325 mg per tablet Take 1 tablet by mouth 2 (two) times daily as needed for Pain.     "lisinopril-hydrochlorothiazide (PRINZIDE,ZESTORETIC) 20-12.5 mg per tablet     MV-MN/FA/LYCOPENE/LUT/HB#178 (HUBERT MULTIVITAMIN FOR MEN ORAL) Take by mouth.    oxaprozin (DAYPRO) 600 mg tablet TAKE TWO TABLETS BY MOUTH ONCE DAILY    pravastatin (PRAVACHOL) 40 MG tablet     tizanidine (ZANAFLEX) 4 MG tablet TAKE 1 TABLET BY MOUTH NIGHTLY AS NEEDED    tramadol (ULTRAM) 50 mg tablet Take 1 tablet (50 mg total) by mouth every 6 (six) hours as needed.           Clinical Reference Information           Your Vitals Were     BP Pulse Resp Height Weight BMI    151/98 69 18 6' 4" (1.93 m) 105.2 kg (231 lb 13 oz) 28.22 kg/m2      Blood Pressure          Most Recent Value    BP  (!)  151/98      Allergies as of 4/5/2017     Chocolate Flavor      Immunizations Administered on Date of Encounter - 4/5/2017     None      Language Assistance Services     ATTENTION: Language assistance services are available, free of charge. Please call [phone].      ATENCIÓN: Si habla maria, tiene a celaya disposición servicios gratuitos de asistencia lingüística. Llame al [phone].     CHÚ Ý: N?u b?n nói Ti?ng Vi?t, có các d?ch v? h? tr? ngôn ng? mi?n phí dành cho b?n. G?i s? [phone].         [facility] complies with applicable Federal civil rights laws and does not discriminate on the basis of race, color, national origin, age, disability, or sex.        "

## 2017-04-05 NOTE — PROGRESS NOTES
Subjective:      Patient ID: Cornel Dick is a 46 y.o. male.    Chief Complaint: Follow-up (Dr. Beard 3 months) and Ankle Pain (left)    Cornel Dick is a 46 y.o. year-old male following up for left ankle and sinus tarsi arthritis and pain. Patient now rates pain as 6/10 shooting  sharp, aching, throbbing and pins and needle that has improved in some ways and worsened in others.  Patient apologizes for forgetting his pain log today but reports that the injection in the ankle was not as effective for pain relief as the sinus tarsi or subtalar injection.  He does feel that it has helped somewhat for his overall pain level.  He agrees today that most of his pain does appear to be subtalar.  Patient also relates that he was started on Celebrex 2 weeks ago by the rheumatologist which also seems to be helping overall pain level.        Review of Systems   Constitution: Negative for chills and fever.   Cardiovascular: Negative for chest pain.   Respiratory: Negative for shortness of breath.    Gastrointestinal: Negative for nausea and vomiting.           Objective:      Physical Exam   Constitutional: He is oriented to person, place, and time. He appears well-developed and well-nourished. No distress.   Cardiovascular:   Pulses:       Dorsalis pedis pulses are 2+ on the right side, and 2+ on the left side.        Posterior tibial pulses are 2+ on the right side, and 2+ on the left side.   Capillary fill time 3 seconds to digits bilateral feet.   Musculoskeletal:   Lower extremities:    Deformities: None    Normal arch height and rectus feet bilaterally.     5/5 muscle strength and tone in all four quadrants bilaterally.     Pain with mils guarding on range of motion in all four quadrants WNL left.   Pain-free range of motion in all four quadrants WNL right.    Pain on palpation: Sinus tarsi and mild to the medial lateral and anterior ankle gutter on the left.     Neurological: He is alert and oriented to  person, place, and time. He displays no Babinski's sign on the right side. He displays no Babinski's sign on the left side.   Plantar protective threshold sensation by touch via 5.07 SWMF present bilaterally.    Skin:   Lower extremities:    Normal turgor, texture, temperature bilaterally. Digital hair present bilaterally. Warm equally bilaterally.    Mild left ankle and sinus tarsi edema.    No varicosities, pigmentary changes.    No wounds, drainage, malodor, erythema, interdigital maceration were noted.  Scaling in a moccasin distribution.     Nails are thick discolored dystrophic on the left first and fifth digits.   Psychiatric: He has a normal mood and affect.   Nursing note and vitals reviewed.            Assessment:       Encounter Diagnoses   Name Primary?    Sinus tarsi syndrome, left Yes    Osteoarthritis of ankle and foot, left     Tenosynovitis of foot          Plan:       Cornel was seen today for follow-up and ankle pain.    Diagnoses and all orders for this visit:    Sinus tarsi syndrome, left    Osteoarthritis of ankle and foot, left    Tenosynovitis of foot      I counseled the patient on his conditions, their implications and medical management.    At this time we discussed treatment options for his primary area of pain along the subtalar and sinus tarsi.  We discussed conservative treatment involving the use of supportive shoe gear at all times, orthotic inserts, stretching, and icing to prevent recurrence. The patient was also guided on the use of anti-inflammatories for flare ups and possible injection in the future if pain persists.  We also discussed further about more invasive treatment options for his subtalar arthritis involving surgical fusion.  For his ankle we also considered ankle arthroscopy.  At this time he wants to continue with more conservative treatment with intermittent injections as needed for flareups.  At the current time he states that his ankle pain is not severe and  wishes to defer from injection.  He agreed to return to clinic if he does have a flareup and we will consider injection at that time.

## 2017-04-05 NOTE — PATIENT INSTRUCTIONS
Wear recommended shoes and insoles and perform stretches and exercises as directed. Discontinue medication if any stomach irritation occurs. Return with shoes and insoles on follow up for adjustments as needed.

## 2017-04-06 ENCOUNTER — OFFICE VISIT (OUTPATIENT)
Dept: PAIN MEDICINE | Facility: CLINIC | Age: 47
End: 2017-04-06
Payer: COMMERCIAL

## 2017-04-06 VITALS
WEIGHT: 231 LBS | BODY MASS INDEX: 28.13 KG/M2 | DIASTOLIC BLOOD PRESSURE: 94 MMHG | RESPIRATION RATE: 18 BRPM | HEART RATE: 80 BPM | HEIGHT: 76 IN | SYSTOLIC BLOOD PRESSURE: 160 MMHG

## 2017-04-06 DIAGNOSIS — M54.16 LUMBAR RADICULOPATHY: Primary | ICD-10-CM

## 2017-04-06 DIAGNOSIS — M51.36 DDD (DEGENERATIVE DISC DISEASE), LUMBAR: ICD-10-CM

## 2017-04-06 DIAGNOSIS — M47.816 SPONDYLOSIS OF LUMBAR REGION WITHOUT MYELOPATHY OR RADICULOPATHY: ICD-10-CM

## 2017-04-06 PROCEDURE — 99999 PR PBB SHADOW E&M-EST. PATIENT-LVL III: CPT | Mod: PBBFAC,,, | Performed by: ANESTHESIOLOGY

## 2017-04-06 PROCEDURE — 1160F RVW MEDS BY RX/DR IN RCRD: CPT | Mod: S$GLB,,, | Performed by: ANESTHESIOLOGY

## 2017-04-06 PROCEDURE — 99213 OFFICE O/P EST LOW 20 MIN: CPT | Mod: S$GLB,,, | Performed by: ANESTHESIOLOGY

## 2017-04-06 NOTE — PROGRESS NOTES
Chief Pain Complaint:  Low Back Pain, Bilateral Leg Pain (Right > L)    History of Present Illness:  This patient is a 46 y.o. male who presents today complaining of the above noted pain/s. The patient describes the pain as follows.    - duration of pain: > 3 years   - timing: intermittent   - character: aching, shooting  - radiating, dermatomal: extends into bilateral lower extremities across dermatomes, more posteriorly, Right > Left  - antecedent trauma, prior spinal surgery: no prior trauma, no prior spinal surgery   - pertinent negatives: No fever, No chills, No weight loss, No bladder dysfunction, No bowel dysfunction, No saddle anesthesia  - pertinent positives: generalized nonspecific Lower Extremity weakness bilaterally    - medications, other therapies tried (physical therapy, injections):     >> gabapentin, zanaflex, Tramadol, norco    >> Has previously undergone Physical Therapy    >> Has previously undergone spinal injection/s: right L3/L4, L5/S1 TF KAVITA on 1-13-17 and 3-10-17 with only weeks of relief after these injections        Imaging / Labs / Studies (reviewed on 4/6/2017):      11/16/16 MRI Lumbar Spine Without Contrast    Narrative Technique:  Multiplanar, multisequence MR images were performed of the lumbar spine obtained without contrast.   Comparison: None.   Results:  Lumbar spine alignment is within normal limits. The vertebral body heights are well maintained, with no fracture.  No marrow signal abnormality suspicious for an infiltrative process.    The conus medullaris terminates at approximately the L1-L2 disk space.  The adjacent soft tissue structures show no significant abnormalities.  There is mild disc desiccation at the L3-L4 and L5-S1 discs.  Minimal disc space narrowing noted at these levels.  L1-L2: No significant central canal or neural foraminal narrowing.  L2-L3: No significant central canal or neural foraminal narrowing.  L3-L4: There is a broad-based right foraminal disc  "extrusion that results in moderate effacement of the exiting L3 nerve root.  No significant central canal narrowing.  L4-L5:  No significant central canal or neural foraminal narrowing.  L5-S1:  Broad-based right paracentral/right foraminal disc protrusion resulting in no significant central canal narrowing.  The right neural foraminal canal is mildly to moderately narrowed.  There is abutment of the exiting L5 nerve root.  No significant effacement of this nerve root.         5/13/16 X-Ray Lumbar Spine Complete 5 View    Narrative Five view lumbar spine.  Findings: Spinal alignment is anatomic.  Mild disc space narrowing and facet arthropathy at L5-S1.  Pedicles appear intact.  No compression fracture or subluxation.           Review of Systems:  CONSTITUTIONAL: patient denies any fever, chills, or weight loss  SKIN: patient denies any rash or itching  RESPIRATORY: patient denies having any shortness of breath  GASTROINTESTINAL: patient reports constipation  GENITOURINARY: patient denies having any abnormal bladder function    MUSCULOSKELETAL:  - patient complains of the above noted pain/s (see chief pain complaint)    NEUROLOGICAL:   - pain as above  - strength in Lower extremities is decreased, BILATERALLY  - sensation in Lower extremities is abnormal, BILATERALLY  - patient denies any loss of bowel or bladder control      PSYCHIATRIC: patient denies any change in mood      Physical Exam:    Vitals:  BP (!) 160/94 (BP Location: Right arm, Patient Position: Sitting, BP Method: Automatic)  Pulse 80  Resp 18  Ht 6' 4" (1.93 m)  Wt 104.8 kg (231 lb)  BMI 28.12 kg/m2   (reviewed on 4/6/2017)    General: alert and oriented, in no apparent distress  Gait: antalgic gait, uses a cane to assist ambulation  Skin: No rashes, No discoloration, No obvious lesions  HEENT: EOMI  Respiratory: respirations nonlabored    Musculoskeletal:  - Any pain on flexion, extension, rotation:    >> pain on extension and rotation  - " "Straight Leg Raise:     >> LEFT :: negative    >> RIGHT :: negative  - Any tenderness to palpation across paraspinal muscles, joints, bursae:     >> across lumbar paraspinals    Neuro:  - Extremity Strength:     >> LEFT :: decreased throughout, worse with dorsiflexion/ plantar flexion    >> RIGHT :: 5/5  - Extremity Reflexes:    >> LEFT  :: 2+    >> RIGHT :: 2+  - Sensory (sensation to light touch):    >> LEFT :: intact    >> RIGHT :: intact     Psych:  Mood and affect is appropriate      Assessment:  Lumbar Radiculopathy  Lumbar Spondylosis   Lumbar DDD      Plan:  - Patient returns for follow-up. He has bilateral leg pain (R>L) across dermatomes, primarily extending along the posterior aspects of the legs, he has low back pain as well.  MRI shows DDD at L3/4 and L5/S1 with right side extension and encroachment of nerve roots, NF narrowing, and facet disease as well. There seems to be no indication for left side radicular pain based on the MRI. He has been seen by Neurosurgery at the [facility], who recommended spinal injections (records attached into "Media").   - S/p right L3/L4, L5/S1 TF KAVITA on 1-13-17 and 3-10-17 with only weeks of relief after both injections.  - Patient was referred to neurosurgery at the [facility] for evaluation.  They recommended that he consider spinal cord stimulation.  Spinal cord stimulation was discussed, he expressed interest, he was provided with educational materials regarding spinal cord stimulation.  I discussed the various systems available with him.  I discussed the various advantages and disadvantages of the different systems, leads.  He was then sent for psychological evaluation at the [facility], he completed this, I will request the report from this today.  - I will order spinal cord stimulator trial.  - He has taken Norco, tramadol, gabapentin previously.  These medications help his pain to some degree and are relatively well tolerated.  I will refill his " medications as needed.  - I discussed in detail the risks, benefits, and alternatives to any and all potential treatment options.  All questions and concerns were fully addressed today in clinic.      >>Pain Disability Questionnaire:  12/20/2016 :: 83    >>Opioid Risk Tool:  12/20/2016 :: 1    >>UDS:  12/20/2016 :: negative (at initial consult)    >> Pain Disability Index:  2/6/2017 :: 39  3/20/2017 :: 43  4/6/2017 :: 46

## 2017-04-11 DIAGNOSIS — M71.21 BAKER'S CYST OF KNEE, RIGHT: ICD-10-CM

## 2017-04-11 DIAGNOSIS — Z01.818 PRE-OP EVALUATION: Primary | ICD-10-CM

## 2017-04-11 DIAGNOSIS — M54.16 LUMBAR RADICULOPATHY: Primary | ICD-10-CM

## 2017-04-11 DIAGNOSIS — M17.12 OSTEOARTHRITIS OF LEFT KNEE, UNSPECIFIED OSTEOARTHRITIS TYPE: ICD-10-CM

## 2017-04-11 DIAGNOSIS — S83.241A ACUTE MEDIAL MENISCUS TEAR OF RIGHT KNEE, INITIAL ENCOUNTER: ICD-10-CM

## 2017-04-11 NOTE — TELEPHONE ENCOUNTER
----- Message from Gray Aguilar sent at 4/11/2017  1:12 PM CDT -----  Contact: pt  He's calling in regards to a RX refill for: Tramadol 50 mg, [facility] in [place], please advise, [phone] (cell)

## 2017-04-11 NOTE — TELEPHONE ENCOUNTER
Return call, requesting refill on Tramadol 50 mg. Order  pended to provider on 04/11/2017. Please advise.    Last filled: 02/01/2017; dispensed 50 tablets with no refills by Wilson Bahena PA-C.   Last visit: 02/01/2017  Next visit: not scheduled    Pharmacy verified by patient.

## 2017-04-13 ENCOUNTER — HOSPITAL ENCOUNTER (OUTPATIENT)
Dept: RADIOLOGY | Facility: HOSPITAL | Age: 47
Discharge: HOME | End: 2017-04-13
Attending: ANESTHESIOLOGY
Payer: COMMERCIAL

## 2017-04-13 ENCOUNTER — TELEPHONE (OUTPATIENT)
Dept: ORTHOPEDICS | Facility: CLINIC | Age: 47
End: 2017-04-13

## 2017-04-13 ENCOUNTER — CLINICAL SUPPORT (OUTPATIENT)
Dept: CARDIOLOGY | Facility: CLINIC | Age: 47
End: 2017-04-13
Payer: COMMERCIAL

## 2017-04-13 DIAGNOSIS — Z01.818 PRE-OP EVALUATION: ICD-10-CM

## 2017-04-13 PROCEDURE — 71010 XR CHEST 1 VIEW PRE-OP: CPT | Mod: TC

## 2017-04-13 PROCEDURE — 93000 ELECTROCARDIOGRAM COMPLETE: CPT | Mod: S$GLB,,, | Performed by: INTERNAL MEDICINE

## 2017-04-13 PROCEDURE — 71010 XR CHEST 1 VIEW PRE-OP: CPT | Mod: 26,,, | Performed by: RADIOLOGY

## 2017-04-13 NOTE — TELEPHONE ENCOUNTER
Returned call to patient, advised him to check his pharmacy over the holiday weekend. Both providers had not addressed refill yet, they may be completing surgery and/or rounding. Patient in agreement and vocalized understanding.

## 2017-04-13 NOTE — TELEPHONE ENCOUNTER
Patient in clinic this morning requesting Tramadol 50 mg refill. Patient requested original prescription on 04/11/2017. It was routed to provider, but it was never filled. I apologized for the delay and would re-send the request to the provider again. I also called patient back after leaving clinic and told him the prescription should be available later today, to check with pharmacy, as both providers check messages between surgery cases. Patient in agreement and vocalized understanding.

## 2017-04-15 DIAGNOSIS — M17.12 ARTHRITIS OF KNEE, LEFT: ICD-10-CM

## 2017-04-15 DIAGNOSIS — M17.32 POST-TRAUMATIC OSTEOARTHRITIS OF LEFT KNEE: ICD-10-CM

## 2017-04-15 DIAGNOSIS — M51.36 DDD (DEGENERATIVE DISC DISEASE), LUMBAR: ICD-10-CM

## 2017-04-15 DIAGNOSIS — M47.816 SPONDYLOSIS OF LUMBAR REGION WITHOUT MYELOPATHY OR RADICULOPATHY: ICD-10-CM

## 2017-04-15 DIAGNOSIS — M23.91 INTERNAL DERANGEMENT OF KNEE JOINT, RIGHT: ICD-10-CM

## 2017-04-15 RX ORDER — OXAPROZIN 600 MG/1
TABLET, FILM COATED ORAL
Qty: 60 TABLET | Refills: 0 | Status: CANCELLED | OUTPATIENT
Start: 2017-04-15

## 2017-04-17 ENCOUNTER — PATIENT MESSAGE (OUTPATIENT)
Dept: ORTHOPEDICS | Facility: CLINIC | Age: 47
End: 2017-04-17

## 2017-04-17 ENCOUNTER — TELEPHONE (OUTPATIENT)
Dept: ORTHOPEDICS | Facility: CLINIC | Age: 47
End: 2017-04-17

## 2017-04-17 DIAGNOSIS — M15.9 PRIMARY OSTEOARTHRITIS INVOLVING MULTIPLE JOINTS: ICD-10-CM

## 2017-04-17 DIAGNOSIS — M47.816 SPONDYLOSIS OF LUMBAR REGION WITHOUT MYELOPATHY OR RADICULOPATHY: ICD-10-CM

## 2017-04-17 DIAGNOSIS — M17.12 ARTHRITIS OF KNEE, LEFT: ICD-10-CM

## 2017-04-17 DIAGNOSIS — M51.36 DDD (DEGENERATIVE DISC DISEASE), LUMBAR: ICD-10-CM

## 2017-04-17 DIAGNOSIS — M17.32 POST-TRAUMATIC OSTEOARTHRITIS OF LEFT KNEE: ICD-10-CM

## 2017-04-17 DIAGNOSIS — M23.91 INTERNAL DERANGEMENT OF KNEE JOINT, RIGHT: ICD-10-CM

## 2017-04-17 RX ORDER — OXAPROZIN 600 MG/1
1200 TABLET, FILM COATED ORAL DAILY
Qty: 60 TABLET | Refills: 2 | Status: SHIPPED | OUTPATIENT
Start: 2017-04-17 | End: 2017-06-21 | Stop reason: ALTCHOICE

## 2017-04-17 NOTE — TELEPHONE ENCOUNTER
Returned pt phone call.pt states she needs a refill on Tramadol and Oxaprozin. Pt states he has called twice and still has not received any medication. Pt stated he is receiving pain meds from Soeldad Bynum PA-C but Dr. Ibarra is supposed to still be filling his Tramadol and Oxaprozin per Chloé. Informed pt i would send the request to the provider. Pt verified understanding

## 2017-04-17 NOTE — TELEPHONE ENCOUNTER
----- Message from Diane Carvajal sent at 4/17/2017 12:38 PM CDT -----  Contact: patient  Patient called and stated he called twice last week for a refill and no one responded to him. He has checked with his pharmacy and nothing was called in.    1. Tramadol  2. Oxaprozin    [facility] [address] [place] [id number]  Phone: [phone] Fax: [phone]    He can be contacted at [phone].    Thanks,  Diane

## 2017-04-23 RX ORDER — TRAMADOL HYDROCHLORIDE 50 MG/1
50 TABLET ORAL EVERY 6 HOURS PRN
Qty: 50 TABLET | Refills: 0 | Status: SHIPPED | OUTPATIENT
Start: 2017-04-23 | End: 2017-04-25

## 2017-04-24 ENCOUNTER — PATIENT MESSAGE (OUTPATIENT)
Dept: PAIN MEDICINE | Facility: CLINIC | Age: 47
End: 2017-04-24

## 2017-04-25 RX ORDER — HYDROCODONE BITARTRATE AND ACETAMINOPHEN 5; 325 MG/1; MG/1
1 TABLET ORAL 3 TIMES DAILY PRN
Qty: 90 TABLET | Refills: 0 | Status: SHIPPED | OUTPATIENT
Start: 2017-04-25 | End: 2017-05-08 | Stop reason: SDUPTHER

## 2017-04-27 RX ORDER — TRAMADOL HYDROCHLORIDE 50 MG/1
TABLET ORAL
Qty: 50 TABLET | Refills: 0 | OUTPATIENT
Start: 2017-04-27

## 2017-04-28 ENCOUNTER — TELEPHONE (OUTPATIENT)
Dept: ORTHOPEDICS | Facility: CLINIC | Age: 47
End: 2017-04-28

## 2017-04-28 RX ORDER — TRAMADOL HYDROCHLORIDE 50 MG/1
50 TABLET ORAL EVERY 6 HOURS PRN
COMMUNITY
End: 2017-07-17

## 2017-04-28 NOTE — PRE-PROCEDURE INSTRUCTIONS
Pre op instructions reviewed with patient per phone:    To confirm, Your surgeon has instructed you:  Surgery is scheduled 5/3/17 at 1200.      Please report to [facility] 1[facility] floor main lobby by 1030 Pre admit nurse will call day prior to surgery for final arrival time.      INSTRUCTIONS IMPORTANT!!!  ¨ Do not eat, drink, or smoke after 12 midnight-including water. OK to brush teeth, no gum, candy or mints!    ¨ Take only these medicines with a small swallow of water-morning of surgery.  None    ____  Do not wear makeup, including mascara.  ____  No powder, lotions or creams to surgical area.  ____  Please remove all jewelry, including piercings and leave at home.  ____  No money or valuables needed. Please leave at home.  ____  Please bring identification and insurance information to hospital.  ____  If going home the same day, arrange for a ride home. You will not be able to   drive if Anesthesia was used.  ____  Children, under 12 years old, must remain in the waiting room with an adult.  They are not allowed in patient areas.  ____  Wear loose fitting clothing. Allow for dressings, bandages.  ____  Stop Aspirin, Ibuprofen, Motrin and Aleve at least 5-7 days before surgery, unless otherwise instructed by your doctor, or the nurse.   You MAY use Tylenol/acetaminophen until day of surgery.  ____  If you take diabetic medication, do not take am of surgery unless instructed by   Doctor.  ____ Stop taking any Fish Oil supplement or any Vitamins that contain Vitamin E at least 5 days prior to surgery.          Bathing Instructions-- The night before surgery and the morning prior to coming to the hospital:   -Do not shave the surgical area.   -Shower and wash your hair and body as usual with anti-bacterial  soap and shampoo.   -Rinse your hair and body completely.   -Use one packet of hibiclens to wash the surgical site (using your hand) gently for 5 minutes.  Do not scrub you skin too  hard.   -Do not use hibiclens on your head, face, or genitals.   -Do not wash with anti-bacterial soap after you use the hibiclens.   -Rinse your body thoroughly.   -Dry with clean, soft towel.  Do not use lotion, cream, deodorant, or powders on   the surgical site.    Use antibacterial soap in place of hibiclens if your surgery is on the head, face or genitals.         Surgical Site Infection    Prevention of surgical site infections:     -Keep incisions clean and dry.   -Do not soak/submerge incisions in water until completely healed.   -Do not apply lotions, powders, creams, or deodorants to site.   -Always make sure hands are cleaned with antibacterial soap/ alcohol-based   prior to touching the surgical site.  (This includes doctors, nurses, staff, and yourself.)    Signs and symptoms:   -Redness and pain around the area where you had surgery   -Drainage of cloudy fluid from your surgical wound   -Fever over 100.4  I have read or had read and explained to me, and understand the above information.

## 2017-04-28 NOTE — TELEPHONE ENCOUNTER
Attempted to contact pt. To inform him medication was available for refill.  Pt. Not available  Information provided to pt's wife.

## 2017-05-02 ENCOUNTER — TELEPHONE (OUTPATIENT)
Dept: PAIN MEDICINE | Facility: CLINIC | Age: 47
End: 2017-05-02

## 2017-05-02 ENCOUNTER — ANESTHESIA EVENT (OUTPATIENT)
Dept: SURGERY | Facility: HOSPITAL | Age: 47
End: 2017-05-02
Payer: COMMERCIAL

## 2017-05-02 RX ORDER — CEPHALEXIN 500 MG/1
500 CAPSULE ORAL 3 TIMES DAILY
Qty: 21 CAPSULE | Refills: 0 | Status: SHIPPED | OUTPATIENT
Start: 2017-05-02 | End: 2017-05-09

## 2017-05-02 NOTE — TELEPHONE ENCOUNTER
Spoke with patient and informed him of an antibiotic that Dr. Puente has sent to his pharmacy.  He is to  today and start taking tomorrow. He acknowledged.  States pre-op nurse called him with his instructions last Friday. I acknowledged.

## 2017-05-03 ENCOUNTER — HOSPITAL ENCOUNTER (OUTPATIENT)
Facility: HOSPITAL | Age: 47
Discharge: HOME | End: 2017-05-03
Attending: ANESTHESIOLOGY | Admitting: ANESTHESIOLOGY
Payer: COMMERCIAL

## 2017-05-03 ENCOUNTER — ANESTHESIA (OUTPATIENT)
Dept: SURGERY | Facility: HOSPITAL | Age: 47
End: 2017-05-03
Payer: COMMERCIAL

## 2017-05-03 ENCOUNTER — SURGERY (OUTPATIENT)
Age: 47
End: 2017-05-03

## 2017-05-03 VITALS
RESPIRATION RATE: 16 BRPM | TEMPERATURE: 98 F | OXYGEN SATURATION: 100 % | HEIGHT: 76 IN | SYSTOLIC BLOOD PRESSURE: 164 MMHG | BODY MASS INDEX: 28.11 KG/M2 | DIASTOLIC BLOOD PRESSURE: 104 MMHG | WEIGHT: 230.81 LBS | HEART RATE: 70 BPM

## 2017-05-03 DIAGNOSIS — M54.16 BILATERAL LUMBAR RADICULOPATHY: Primary | ICD-10-CM

## 2017-05-03 DIAGNOSIS — M54.16 LUMBAR RADICULOPATHY: ICD-10-CM

## 2017-05-03 PROCEDURE — 71000015 HC POSTOP RECOV 1ST HR: Performed by: ANESTHESIOLOGY

## 2017-05-03 PROCEDURE — C1897 LEAD, NEUROSTIM TEST KIT: HCPCS | Performed by: ANESTHESIOLOGY

## 2017-05-03 PROCEDURE — 25000003 PHARM REV CODE 250: Performed by: ANESTHESIOLOGY

## 2017-05-03 PROCEDURE — 63650 IMPLANT NEUROELECTRODES: CPT | Mod: ,,, | Performed by: ANESTHESIOLOGY

## 2017-05-03 PROCEDURE — 95971 ALYS SMPL SP/PN NPGT W/PRGRM: CPT | Mod: 59,,, | Performed by: ANESTHESIOLOGY

## 2017-05-03 PROCEDURE — C1778 LEAD, NEUROSTIMULATOR: HCPCS | Performed by: ANESTHESIOLOGY

## 2017-05-03 PROCEDURE — 37000009 HC ANESTHESIA EA ADD 15 MINS: Performed by: ANESTHESIOLOGY

## 2017-05-03 PROCEDURE — 37000008 HC ANESTHESIA 1ST 15 MINUTES: Performed by: ANESTHESIOLOGY

## 2017-05-03 PROCEDURE — 36000706: Performed by: ANESTHESIOLOGY

## 2017-05-03 PROCEDURE — 63600175 PHARM REV CODE 636 W HCPCS: Performed by: ANESTHESIOLOGY

## 2017-05-03 PROCEDURE — 36000707: Performed by: ANESTHESIOLOGY

## 2017-05-03 PROCEDURE — 27201423 OPTIME MED/SURG SUP & DEVICES STERILE SUPPLY: Performed by: ANESTHESIOLOGY

## 2017-05-03 PROCEDURE — 71000033 HC RECOVERY, INTIAL HOUR: Performed by: ANESTHESIOLOGY

## 2017-05-03 PROCEDURE — 63600175 PHARM REV CODE 636 W HCPCS: Performed by: NURSE ANESTHETIST, CERTIFIED REGISTERED

## 2017-05-03 DEVICE — LEAD NEROSTIMLTR INFINION 50CM: Type: IMPLANTABLE DEVICE | Site: SPINE THORACIC | Status: FUNCTIONAL

## 2017-05-03 RX ORDER — BUPIVACAINE HYDROCHLORIDE 2.5 MG/ML
INJECTION, SOLUTION EPIDURAL; INFILTRATION; INTRACAUDAL
Status: DISCONTINUED | OUTPATIENT
Start: 2017-05-03 | End: 2017-05-03 | Stop reason: HOSPADM

## 2017-05-03 RX ORDER — PROPOFOL 10 MG/ML
VIAL (ML) INTRAVENOUS CONTINUOUS PRN
Status: DISCONTINUED | OUTPATIENT
Start: 2017-05-03 | End: 2017-05-03

## 2017-05-03 RX ORDER — SODIUM CHLORIDE, SODIUM LACTATE, POTASSIUM CHLORIDE, CALCIUM CHLORIDE 600; 310; 30; 20 MG/100ML; MG/100ML; MG/100ML; MG/100ML
INJECTION, SOLUTION INTRAVENOUS CONTINUOUS
Status: DISCONTINUED | OUTPATIENT
Start: 2017-05-03 | End: 2017-05-03 | Stop reason: HOSPADM

## 2017-05-03 RX ORDER — MEPERIDINE HYDROCHLORIDE 50 MG/ML
12.5 INJECTION INTRAMUSCULAR; INTRAVENOUS; SUBCUTANEOUS ONCE AS NEEDED
Status: DISCONTINUED | OUTPATIENT
Start: 2017-05-03 | End: 2017-05-03 | Stop reason: HOSPADM

## 2017-05-03 RX ORDER — LIDOCAINE HYDROCHLORIDE 20 MG/ML
INJECTION, SOLUTION INFILTRATION; PERINEURAL
Status: DISCONTINUED | OUTPATIENT
Start: 2017-05-03 | End: 2017-05-03 | Stop reason: HOSPADM

## 2017-05-03 RX ORDER — MIDAZOLAM HYDROCHLORIDE 1 MG/ML
INJECTION, SOLUTION INTRAMUSCULAR; INTRAVENOUS
Status: DISCONTINUED | OUTPATIENT
Start: 2017-05-03 | End: 2017-05-03

## 2017-05-03 RX ORDER — OXYCODONE HYDROCHLORIDE 5 MG/1
5 TABLET ORAL
Status: DISCONTINUED | OUTPATIENT
Start: 2017-05-03 | End: 2017-05-03 | Stop reason: HOSPADM

## 2017-05-03 RX ORDER — CEFAZOLIN SODIUM 2 G/50ML
2 SOLUTION INTRAVENOUS ONCE
Status: COMPLETED | OUTPATIENT
Start: 2017-05-03 | End: 2017-05-03

## 2017-05-03 RX ORDER — MORPHINE SULFATE 10 MG/ML
2 INJECTION INTRAMUSCULAR; INTRAVENOUS; SUBCUTANEOUS EVERY 5 MIN PRN
Status: DISCONTINUED | OUTPATIENT
Start: 2017-05-03 | End: 2017-05-03 | Stop reason: HOSPADM

## 2017-05-03 RX ORDER — SODIUM CHLORIDE 0.9 % (FLUSH) 0.9 %
3 SYRINGE (ML) INJECTION
Status: DISCONTINUED | OUTPATIENT
Start: 2017-05-03 | End: 2017-05-03 | Stop reason: HOSPADM

## 2017-05-03 RX ORDER — FENTANYL CITRATE 50 UG/ML
INJECTION, SOLUTION INTRAMUSCULAR; INTRAVENOUS
Status: DISCONTINUED | OUTPATIENT
Start: 2017-05-03 | End: 2017-05-03

## 2017-05-03 RX ADMIN — SODIUM CHLORIDE, SODIUM LACTATE, POTASSIUM CHLORIDE, AND CALCIUM CHLORIDE: 600; 310; 30; 20 INJECTION, SOLUTION INTRAVENOUS at 12:05

## 2017-05-03 RX ADMIN — FENTANYL CITRATE 100 MCG: 50 INJECTION, SOLUTION INTRAMUSCULAR; INTRAVENOUS at 12:05

## 2017-05-03 RX ADMIN — PROPOFOL 1000 MG/HR: 10 INJECTION, EMULSION INTRAVENOUS at 12:05

## 2017-05-03 RX ADMIN — LIDOCAINE HYDROCHLORIDE 20 ML: 20 INJECTION, SOLUTION INFILTRATION; PERINEURAL at 12:05

## 2017-05-03 RX ADMIN — MIDAZOLAM HYDROCHLORIDE 4 MG: 1 INJECTION, SOLUTION INTRAMUSCULAR; INTRAVENOUS at 12:05

## 2017-05-03 RX ADMIN — CEFAZOLIN SODIUM 2 G: 2 SOLUTION INTRAVENOUS at 12:05

## 2017-05-03 RX ADMIN — BUPIVACAINE HYDROCHLORIDE 10 ML: 2.5 INJECTION, SOLUTION EPIDURAL; INFILTRATION; INTRACAUDAL; PERINEURAL at 12:05

## 2017-05-03 NOTE — IP AVS SNAPSHOT
[address] [place] [address]           Patient Discharge Instructions   Our goal is to set you up for success. This packet includes information on your condition, medications, and your home care.  It will help you care for yourself to prevent having to return to the hospital.     Please ask your nurse if you have any questions.      There are many details to remember when preparing to leave the hospital. Here is what you will need to do:    1. Take your medicine. If you are prescribed medications, review your Medication List on the following pages. You may have new medications to  at the pharmacy and others that you'll need to stop taking. Review the instructions for how and when to take your medications. Talk with your doctor or nurses if you are unsure of what to do.     2. Go to your follow-up appointments. Specific follow-up information is listed in the following pages. Your may be contacted by a nurse or clinical provider about future appointments. Be sure we have all of the phone numbers to reach you. Please contact your provider's office if you are unable to make an appointment.     3. Watch for warning signs. Your doctor or nurse will give you detailed warning signs to watch for and when to call for assistance. These instructions may also include educational information about your condition. If you experience any of warning signs to your health, call your doctor.               ** Verify the list of medication(s) below is accurate and up to date. Carry this with you in case of emergency. If your medications have changed, please notify your healthcare provider.             Medication List      ASK your doctor about these medications        Additional Info                      amlodipine 10 MG tablet   Commonly known as:  NORVASC   Refills:  0    Instructions:  Take by mouth every evening.     Begin Date    AM    Noon    PM    Bedtime       ARTHRITIS  PAIN RELIEVER ORAL   Refills:  0    Instructions:  Take by mouth.     Begin Date    AM    Noon    PM    Bedtime       cephALEXin 500 MG capsule   Commonly known as:  KEFLEX   Quantity:  21 capsule   Refills:  0   Dose:  500 mg    Instructions:  Take 1 capsule (500 mg total) by mouth 3 (three) times daily.     Begin Date    AM    Noon    PM    Bedtime       efinaconazole 10 % Sana   Commonly known as:  JUBLIA   Quantity:  8 mL   Refills:  11   Dose:  1 application    Instructions:  Apply 1 application topically once daily.     Begin Date    AM    Noon    PM    Bedtime       gabapentin 400 MG capsule   Commonly known as:  NEURONTIN   Quantity:  90 capsule   Refills:  2    Instructions:  Ttake 1 capsule QAM and 2 capsules at night.     Begin Date    AM    Noon    PM    Bedtime       hydrocodone-acetaminophen 5-325mg 5-325 mg per tablet   Commonly known as:  NORCO   Quantity:  90 tablet   Refills:  0   Dose:  1 tablet    Instructions:  Take 1 tablet by mouth 3 (three) times daily as needed for Pain.     Begin Date    AM    Noon    PM    Bedtime       lisinopril-hydrochlorothiazide 20-12.5 mg per tablet   Commonly known as:  PRINZIDE,ZESTORETIC   Refills:  0      Begin Date    AM    Noon    PM    Bedtime       HUBERT MULTIVITAMIN FOR MEN ORAL   Refills:  0    Instructions:  Take by mouth.     Begin Date    AM    Noon    PM    Bedtime       oxaprozin 600 mg tablet   Commonly known as:  DAYPRO   Quantity:  60 tablet   Refills:  2   Dose:  1200 mg    Instructions:  Take 2 tablets (1,200 mg total) by mouth once daily.     Begin Date    AM    Noon    PM    Bedtime       pravastatin 40 MG tablet   Commonly known as:  PRAVACHOL   Refills:  0   Dose:  40 mg    Instructions:  Take 40 mg by mouth every evening.     Begin Date    AM    Noon    PM    Bedtime       tizanidine 4 MG tablet   Commonly known as:  ZANAFLEX   Quantity:  30 tablet   Refills:  5   Dose:  4 mg    Instructions:  Take 1 tablet (4 mg total) by mouth nightly as  needed.     Begin Date    AM    Noon    PM    Bedtime       tramadol 50 mg tablet   Commonly known as:  ULTRAM   Refills:  0   Dose:  50 mg    Instructions:  Take 50 mg by mouth every 6 (six) hours as needed for Pain.     Begin Date    AM    Noon    PM    Bedtime                  Please bring to all follow up appointments:    1. A copy of your discharge instructions.  2. All medicines you are currently taking in their original bottles.  3. Identification and insurance card.    Please arrive 15 minutes ahead of scheduled appointment time.    Please call 24 hours in advance if you must reschedule your appointment and/or time.        Your Scheduled Appointments     May 08, 2017 12:40 PM CDT   Established Patient Visit with Greg Puente MD   [facility]    [address]  [place] [id number] [phone]                  Discharge Instructions       Leave abdominal binder in place until follow up with Dr. Puente  No showers or baths until after seeing Dr. Puente    General Information:    1.  Do not drink alcoholic beverages including beer for 24 hours or as long as you are on pain medication..  2.  Do not drive a motor vehicle, operate machinery or power tools, or signs legal papers for 24 hours or as long as you are on pain medication.   3.  You may experience light-headedness, dizziness, and sleepiness following surgery. Please do not stay alone. A responsible adult should be with you for this 24 hour period.  4.  Go home and rest.  5. Progress slowly to a normal diet unless instructed.  Otherwise, begin with liquids such as soft drinks, then soup and crackers working up to solid foods. Drink plenty of nonalcoholic fluids.  6.  Certain anesthetics and pain medications produce nausea and vomiting in certain       individuals. If nausea becomes a problem at home, call you doctor.  7. A nurse will be calling you sometime after surgery. Do not be alarmed. This is our way  of finding out how you are doing.  8. Several times every hour while you are awake, take 2-3 deep breaths and cough. If you had stomach surgery hold a pillow or rolled towel firmly against your stomach before you cough. This will help with any pain the cough might cause.  9. Several times every hour while you are awake, pump and flex your feet 5-6 times and do foot circles. This will help prevent blood clots.  10.Call your doctor for severe pain, bleeding, fever, or signs or symptoms of infection (pain, swelling, redness, foul odor, drainage).  11.You can contact your doctor anytime by [phone] for the [facility] (at [facility]) or [phone] for the [facility] on [address].   [email or web address] is another way to contact your doctor if you are an active participant online with [email or web address].        Spinal Cord Stimulation  Pain messages travel over nerve pathways to the spinal cord, inside the spine. The spinal cord carries the messages to the brain. Constant pain messages can cause long-term pain that is hard to treat. This is known as chronic pain. Spinal cord stimulation uses a medical device to send signals to the nerve pathways inside your daljit cord. These signals help block the pain.    Keeping a pain log  Your doctor may ask you to keep a pain log for a certain amount of time. In it, you may answer certain questions: When do you feel pain? What does it feel like? How long does the pain last? What makes it better or worse? When the stimulation is on, is your pain relieved? Your answers help show how well spinal cord stimulation may work for you. Your doctor will give you guidelines for your pain log. During the time you write the log, you may not be able to take pain medications. Be sure to discuss this with your doctor.    Spinal cord stimulation may help  Spinal cord stimulation is one treatment for chronic pain. Certain criteria need to be met to be a good candidate for spinal cord  "stimulation. A small medical device sends signals to your spinal cord. These signals keep the chronic pain messages from being sent to your brain. Instead, you may feel tingling from the electrical signals. A trial stimulator that is worn outside the body in tried first to see if it will work. If it does, the permanent stimulator system may be used. This device can be removed at any time.  The stimulator system  The stimulator system has several parts. A power source makes the signals. This power source may be worn outside the body or implanted under the skin on your abdomen or buttocks. One or more leads (flexible, plastic-covered wires or paddle) are placed inside the body to carry the signals to the spinal cord. Your doctor can explain the system youll be using in more detail.  Risks and possible complications  · Infection  · Bleeding  · Nerve damage  · Spinal cord damage  · Failure to relieve pain   Date Last Reviewed: 10/19/2015  © 0300-2040 [employer]. [address] [place] [address]. All rights reserved. This information is not intended as a substitute for professional medical care. Always follow your healthcare professional's instructions.            Admission Information     Date & Time Provider Department CSN    5/3/2017 10:40 AM Greg Puente MD [facility] -  [id number]      Care Providers     Provider Role Specialty Primary office phone    Greg Puente MD Attending Provider Pain Medicine [phone]    Greg Puente MD Surgeon  Pain Medicine [phone]      Your Vitals Were     BP Pulse Temp Resp Height Weight    159/100 71 98.4 °F (36.9 °C) (Temporal) 17 6' 4" (1.93 m) 104.7 kg (230 lb 13.2 oz)    SpO2 BMI             100% 28.1 kg/m2         Recent Lab Values     No lab values to display.      Allergies as of 5/3/2017        Reactions    Chocolate Flavor Swelling      [facility]     [facility] Nurse Care Line - 24/7 Assistance  Unless " otherwise directed by your provider, please contact [facility], our nurse care line that is available for 24/7 assistance.     Registered nurses in the [facility] provide clinical advisement, health education, appointment booking, and other advisory services.  Call for this free service at [phone].        Advance Directives     An advance directive is a document which, in the event you are no longer able to make decisions for yourself, tells your healthcare team what kind of treatment you do or do not want to receive, or who you would like to make those decisions for you.  If you do not currently have an advance directive, [facility] encourages you to create one.  For more information call:  [phone]), [phone] ([phone]),  or log on to [email or web address].        Language Assistance Services     ATTENTION: Language assistance services are available, free of charge. Please call [phone].      ATENCIÓN: Si habla español, tiene a celaya disposición servicios gratuitos de asistencia lingüística. Llame al [phone].     CHÚ Ý: N?u b?n nói Ti?ng Vi?t, có các d?ch v? h? tr? ngôn ng? mi?n phí dành cho b?n. G?i s? [phone].         [facility] complies with applicable Federal civil rights laws and does not discriminate on the basis of race, color, national origin, age, disability, or sex.

## 2017-05-03 NOTE — INTERVAL H&P NOTE
The patient has been examined and the H&P has been reviewed:    I concur with the findings and no changes have occurred since H&P was written.    Anesthesia/Surgery risks, benefits and alternative options discussed and understood by patient/family.          Active Hospital Problems    Diagnosis  POA    Lumbar radiculopathy [M54.16]  Yes      Resolved Hospital Problems    Diagnosis Date Resolved POA   No resolved problems to display.

## 2017-05-03 NOTE — H&P (VIEW-ONLY)
Chief Pain Complaint:  Low Back Pain, Bilateral Leg Pain (Right > L)    History of Present Illness:  This patient is a 46 y.o. male who presents today complaining of the above noted pain/s. The patient describes the pain as follows.    - duration of pain: > 3 years   - timing: intermittent   - character: aching, shooting  - radiating, dermatomal: extends into bilateral lower extremities across dermatomes, more posteriorly, Right > Left  - antecedent trauma, prior spinal surgery: no prior trauma, no prior spinal surgery   - pertinent negatives: No fever, No chills, No weight loss, No bladder dysfunction, No bowel dysfunction, No saddle anesthesia  - pertinent positives: generalized nonspecific Lower Extremity weakness bilaterally    - medications, other therapies tried (physical therapy, injections):     >> gabapentin, zanaflex, Tramadol, norco    >> Has previously undergone Physical Therapy    >> Has previously undergone spinal injection/s: right L3/L4, L5/S1 TF KAVITA on 1-13-17 and 3-10-17 with only weeks of relief after these injections        Imaging / Labs / Studies (reviewed on 4/6/2017):      11/16/16 MRI Lumbar Spine Without Contrast    Narrative Technique:  Multiplanar, multisequence MR images were performed of the lumbar spine obtained without contrast.   Comparison: None.   Results:  Lumbar spine alignment is within normal limits. The vertebral body heights are well maintained, with no fracture.  No marrow signal abnormality suspicious for an infiltrative process.    The conus medullaris terminates at approximately the L1-L2 disk space.  The adjacent soft tissue structures show no significant abnormalities.  There is mild disc desiccation at the L3-L4 and L5-S1 discs.  Minimal disc space narrowing noted at these levels.  L1-L2: No significant central canal or neural foraminal narrowing.  L2-L3: No significant central canal or neural foraminal narrowing.  L3-L4: There is a broad-based right foraminal disc  "extrusion that results in moderate effacement of the exiting L3 nerve root.  No significant central canal narrowing.  L4-L5:  No significant central canal or neural foraminal narrowing.  L5-S1:  Broad-based right paracentral/right foraminal disc protrusion resulting in no significant central canal narrowing.  The right neural foraminal canal is mildly to moderately narrowed.  There is abutment of the exiting L5 nerve root.  No significant effacement of this nerve root.         5/13/16 X-Ray Lumbar Spine Complete 5 View    Narrative Five view lumbar spine.  Findings: Spinal alignment is anatomic.  Mild disc space narrowing and facet arthropathy at L5-S1.  Pedicles appear intact.  No compression fracture or subluxation.           Review of Systems:  CONSTITUTIONAL: patient denies any fever, chills, or weight loss  SKIN: patient denies any rash or itching  RESPIRATORY: patient denies having any shortness of breath  GASTROINTESTINAL: patient reports constipation  GENITOURINARY: patient denies having any abnormal bladder function    MUSCULOSKELETAL:  - patient complains of the above noted pain/s (see chief pain complaint)    NEUROLOGICAL:   - pain as above  - strength in Lower extremities is decreased, BILATERALLY  - sensation in Lower extremities is abnormal, BILATERALLY  - patient denies any loss of bowel or bladder control      PSYCHIATRIC: patient denies any change in mood      Physical Exam:    Vitals:  BP (!) 160/94 (BP Location: Right arm, Patient Position: Sitting, BP Method: Automatic)  Pulse 80  Resp 18  Ht 6' 4" (1.93 m)  Wt 104.8 kg (231 lb)  BMI 28.12 kg/m2   (reviewed on 4/6/2017)    General: alert and oriented, in no apparent distress  Gait: antalgic gait, uses a cane to assist ambulation  Skin: No rashes, No discoloration, No obvious lesions  HEENT: EOMI  Respiratory: respirations nonlabored    Musculoskeletal:  - Any pain on flexion, extension, rotation:    >> pain on extension and rotation  - " "Straight Leg Raise:     >> LEFT :: negative    >> RIGHT :: negative  - Any tenderness to palpation across paraspinal muscles, joints, bursae:     >> across lumbar paraspinals    Neuro:  - Extremity Strength:     >> LEFT :: decreased throughout, worse with dorsiflexion/ plantar flexion    >> RIGHT :: 5/5  - Extremity Reflexes:    >> LEFT  :: 2+    >> RIGHT :: 2+  - Sensory (sensation to light touch):    >> LEFT :: intact    >> RIGHT :: intact     Psych:  Mood and affect is appropriate      Assessment:  Lumbar Radiculopathy  Lumbar Spondylosis   Lumbar DDD      Plan:  - Patient returns for follow-up. He has bilateral leg pain (R>L) across dermatomes, primarily extending along the posterior aspects of the legs, he has low back pain as well.  MRI shows DDD at L3/4 and L5/S1 with right side extension and encroachment of nerve roots, NF narrowing, and facet disease as well. There seems to be no indication for left side radicular pain based on the MRI. He has been seen by Neurosurgery at the [facility], who recommended spinal injections (records attached into "Media").   - S/p right L3/L4, L5/S1 TF KAVITA on 1-13-17 and 3-10-17 with only weeks of relief after both injections.  - Patient was referred to neurosurgery at the [facility] for evaluation.  They recommended that he consider spinal cord stimulation.  Spinal cord stimulation was discussed, he expressed interest, he was provided with educational materials regarding spinal cord stimulation.  I discussed the various systems available with him.  I discussed the various advantages and disadvantages of the different systems, leads.  He was then sent for psychological evaluation at the [facility], he completed this, I will request the report from this today.  - I will order spinal cord stimulator trial.  - He has taken Norco, tramadol, gabapentin previously.  These medications help his pain to some degree and are relatively well tolerated.  I will refill his " medications as needed.  - I discussed in detail the risks, benefits, and alternatives to any and all potential treatment options.  All questions and concerns were fully addressed today in clinic.      >>Pain Disability Questionnaire:  12/20/2016 :: 83    >>Opioid Risk Tool:  12/20/2016 :: 1    >>UDS:  12/20/2016 :: negative (at initial consult)    >> Pain Disability Index:  2/6/2017 :: 39  3/20/2017 :: 43  4/6/2017 :: 46

## 2017-05-03 NOTE — DISCHARGE SUMMARY
[facility]  Discharge Note       Description of Procedure: Spinal Cord Stimulator Trial ([employer], two 16 contact leads placed percutaneously) under Fluoroscopic Guidance    Procedure Date: 5/3/2017    Admit Date: 5/3/2017  Discharge Date: 5/3/2017     Attending Physician: Greg Puente   Discharge Provider: Greg Puente    Preoperative Diagnosis:   Active Hospital Problems    Diagnosis  POA    Lumbar radiculopathy [M54.16]  Yes      Resolved Hospital Problems    Diagnosis Date Resolved POA   No resolved problems to display.        Postoperative Diagnosis: as above, same as preoperative diagnosis    Discharged Condition: Stable    Hospital Course: Patient was admitted for an outpatient procedure. The procedure was tolerated well with no complications.    Final Diagnoses: Same as principal problem.    Disposition: Home, self-care.    Follow up/Patient Instructions:  Follow-up in clinic in 5 days for lead removal.    Medications: Take Keflex 500 mg po tid for 5 days. The patient was advised to resume normal medication regimen without change.  Specific information was provided regarding restarting any anticoagulant/s.    Discharge Procedure Orders (must include Diet, Follow-up, Activity):  Light activity for the remainder of the day, resume normal activity tomorrow. Resume normal diet. Follow-up in clinic in 5 days.

## 2017-05-03 NOTE — DISCHARGE INSTRUCTIONS
Leave abdominal binder in place until follow up with Dr. Puente  No showers or baths until after seeing Dr. Puente    General Information:    1.  Do not drink alcoholic beverages including beer for 24 hours or as long as you are on pain medication..  2.  Do not drive a motor vehicle, operate machinery or power tools, or signs legal papers for 24 hours or as long as you are on pain medication.   3.  You may experience light-headedness, dizziness, and sleepiness following surgery. Please do not stay alone. A responsible adult should be with you for this 24 hour period.  4.  Go home and rest.  5. Progress slowly to a normal diet unless instructed.  Otherwise, begin with liquids such as soft drinks, then soup and crackers working up to solid foods. Drink plenty of nonalcoholic fluids.  6.  Certain anesthetics and pain medications produce nausea and vomiting in certain       individuals. If nausea becomes a problem at home, call you doctor.  7. A nurse will be calling you sometime after surgery. Do not be alarmed. This is our way of finding out how you are doing.  8. Several times every hour while you are awake, take 2-3 deep breaths and cough. If you had stomach surgery hold a pillow or rolled towel firmly against your stomach before you cough. This will help with any pain the cough might cause.  9. Several times every hour while you are awake, pump and flex your feet 5-6 times and do foot circles. This will help prevent blood clots.  10.Call your doctor for severe pain, bleeding, fever, or signs or symptoms of infection (pain, swelling, redness, foul odor, drainage).  11.You can contact your doctor anytime by [phone] for the [facility] (at [facility]) or [phone] for the [facility] on [facility].   [email or web address] is another way to contact your doctor if you are an active participant online with [facility].        Spinal Cord Stimulation  Pain messages travel over nerve pathways to the  spinal cord, inside the spine. The spinal cord carries the messages to the brain. Constant pain messages can cause long-term pain that is hard to treat. This is known as chronic pain. Spinal cord stimulation uses a medical device to send signals to the nerve pathways inside your daljit cord. These signals help block the pain.    Keeping a pain log  Your doctor may ask you to keep a pain log for a certain amount of time. In it, you may answer certain questions: When do you feel pain? What does it feel like? How long does the pain last? What makes it better or worse? When the stimulation is on, is your pain relieved? Your answers help show how well spinal cord stimulation may work for you. Your doctor will give you guidelines for your pain log. During the time you write the log, you may not be able to take pain medications. Be sure to discuss this with your doctor.    Spinal cord stimulation may help  Spinal cord stimulation is one treatment for chronic pain. Certain criteria need to be met to be a good candidate for spinal cord stimulation. A small medical device sends signals to your spinal cord. These signals keep the chronic pain messages from being sent to your brain. Instead, you may feel tingling from the electrical signals. A trial stimulator that is worn outside the body in tried first to see if it will work. If it does, the permanent stimulator system may be used. This device can be removed at any time.  The stimulator system  The stimulator system has several parts. A power source makes the signals. This power source may be worn outside the body or implanted under the skin on your abdomen or buttocks. One or more leads (flexible, plastic-covered wires or paddle) are placed inside the body to carry the signals to the spinal cord. Your doctor can explain the system youll be using in more detail.  Risks and possible complications  · Infection  · Bleeding  · Nerve damage  · Spinal cord damage  · Failure to  relieve pain   Date Last Reviewed: 10/19/2015  © 8975-5780 [employer]. [address] [place] [address], [place] [address]. All rights reserved. This information is not intended as a substitute for professional medical care. Always follow your healthcare professional's instructions.

## 2017-05-03 NOTE — ANESTHESIA PREPROCEDURE EVALUATION
05/03/2017  Cornel Dick is a 46 y.o., male.    Anesthesia Evaluation    I have reviewed the Patient Summary Reports.    I have reviewed the Nursing Notes.   I have reviewed the Medications.     Review of Systems  Anesthesia Hx:  No problems with previous Anesthesia    Social:  Non-Smoker    Hematology/Oncology:  Hematology Normal   Oncology Normal     EENT/Dental:EENT/Dental Normal   Cardiovascular:   Hypertension, well controlled    Pulmonary:  Pulmonary Normal    Renal/:  Renal/ Normal     Hepatic/GI:  Hepatic/GI Normal    Musculoskeletal:   Arthritis   Spine Disorders: Disc disease and Degenerative disease    Neurological:   Neuromuscular Disease,    Endocrine:  Endocrine Normal    Dermatological:  Skin Normal    Psych:  Psychiatric Normal           Physical Exam  General:  Well nourished    Airway/Jaw/Neck:  Airway Findings: Mouth Opening: Normal Tongue: Normal  General Airway Assessment: Adult       Chest/Lungs:  Chest/Lungs Findings: Clear to auscultation     Heart/Vascular:  Heart Findings: Rate: Normal             Anesthesia Plan  Type of Anesthesia, risks & benefits discussed:  Anesthesia Type:  MAC  Patient's Preference:   Intra-op Monitoring Plan:   Intra-op Monitoring Plan Comments:   Post Op Pain Control Plan:   Post Op Pain Control Plan Comments:   Induction:   IV  Beta Blocker:  Patient is not currently on a Beta-Blocker (No further documentation required).       Informed Consent: Patient understands risks and agrees with Anesthesia plan.  Questions answered. Anesthesia consent signed with patient.  ASA Score: 2     Day of Surgery Review of History & Physical: I have interviewed and examined the patient. I have reviewed the patient's H&P dated:  There are no significant changes.          Ready For Surgery From Anesthesia Perspective.

## 2017-05-03 NOTE — OP NOTE
Procedure: Spinal Cord Stimulator Trial, Percutaneous Placement of 2 [employer] Neurostimulator 16 Contact Leads under fluoroscopic guidance    Date of the procedure: 05/03/2017    PREOPERATIVE DIAGNOSIS: Lumbar radiculopathy  POSTOPERATIVE DIAGNOSIS: Lumbar radiculopathy    PROVIDER: Greg Puente MD  Assistant(s): None    ANESTHESIA: Local, IV sedation, MAC (propofol gtt)    PROCEDURE INDICATIONS: The patient has a history of pain that has failed conservative management.    PROCEDURE DESCRIPTION:  The patient was seen and identified in the preoperative area. Risks, benefits, complications, and alternatives were discussed with the patient. The patient agreed to proceed with the procedure and signed the consent. An IV was placed. The patient was taken to the operating room, positioned on the operating table in prone orientation, and a time out was then performed. The patient received prophylactic IV antibiotics (Cefazolin 2 g) which was started before the start of the procedure.     The thoracic and lumbar areas were widely prepped with ChloraPrep and draped in usual sterile fashion. Sterile drapes, gowns, and gloves were utilized. The fluoroscopic camera was also covered with a sterile drape. The fluoroscope was placed in the AP orientation and the L1/2 interlaminar space was identified and marked using a radiopaque and sterile skin marker. I then moved the radiopaque marker 1 to 1.5 interspace/s caudal and paramedian and marked this location. I localized the subcutaneous tissues using a left paramedian trajectory targeting the above noted interlaminar space as the epidural entry point. A total of 6 mL of local was deposited subcutaneously, the anesthetic was comprised of equal parts 0.25% Bupivacaine and 2% Lidocaine. Under fluoroscopic guidance, a 14 gauge coude Tuohy epidural needle was advanced into the left L1/2 interlaminar space using AP, contralateral oblique, and lateral fluoroscopic guidance and  loss-of-resistance technique. Once the epidural space was reached, the Tuohy stylet was removed and a 16 contact percutaneous lead was passed throught the Tuohy conduit. Dorsal portioning in the epidural space was confirmed on lateral fluoroscopic view. Then using AP views, the above noted lead was advanced cephalad approaching the top of the T9 vertebrae and positioned just left of midline.    Using an approach congruent to that described above, subcutaneous tissues were localized using a right paramedian trajectory targeting the  L1/2 interlaminar space. A total of 6 mL of above noted local was deposited subcutaneously. A 14 gauge coude Tuohy epidural needle was then inserted contralateral to the first needle and was advanced toward the above noted interlaminar space using paramedian approach. Using AP, contralateral oblique, and lateral fluoroscopy in conjunction with a loss-of-resistance technique, the epidural space was accessed. The stylet wa removed and a second 16 contact percutaneous lead was passed throught the Tuohy conduit and was noted to be in the dorsal portion of the epidural space on lateral fluoroscopic view. Then using AP views, the lead was advanced cephalad to the top of the T9 vertebrae and positioned just right of midline, contralateral to the initial lead.    The leads were then tested and they gave good stimulation in the patient's most painful areas. The Tuohy needles were then removed under life fluoroscopy in order to verify that lead position remained static. With the needles removed, the camera verified the leads to be in the same positions. Then the leads were fixated to the skin using Steri Strips, Mastizol, Tegaderm, and tape.     Description of Findings: Not applicable    Prosthetic devices, grafts, tissues, or devices implanted: None    Specimen Removed: No    Estimated Blood Loss: minimal    COMPLICATIONS: None    DISPOSITION / PLANS: The patient was placed in a supine position  and transferred to the recovery area in a stable condition for observation. There was no evidence of lower extremity motor or sensory deficit after the procedure.  Patient was discharged from the recovery room after meeting discharge criteria. Home discharge instructions were given to the patient by the staff. The patient was reexamined prior to discharge. The patient will follow up in clinic in 5 days for removal of the leads. Patient was sent on Keflex 500 mg one tab PO tid, to be continued throughout the trial period.

## 2017-05-03 NOTE — TRANSFER OF CARE
"Anesthesia Transfer of Care Note    Patient: Cornel Dick    Procedure(s) Performed: Procedure(s) (LRB):  TRIAL-SPINAL CORD STIMULATOR (N/A)    Patient location: PACU    Anesthesia Type: MAC    Transport from OR: Transported from OR on room air with adequate spontaneous ventilation    Post pain: adequate analgesia    Post assessment: no apparent anesthetic complications    Post vital signs: stable    Level of consciousness: awake    Nausea/Vomiting: no nausea/vomiting    Complications: none          Last vitals:   Visit Vitals    BP (!) 168/97 (BP Location: Right arm, Patient Position: Sitting, BP Method: Automatic)    Pulse 75    Temp 36.8 °C (98.2 °F) (Skin)    Resp 16    Ht 6' 4" (1.93 m)    Wt 104.7 kg (230 lb 13.2 oz)    SpO2 100%    BMI 28.1 kg/m2     "

## 2017-05-03 NOTE — PLAN OF CARE
Ambulated with cane and limp left leg. No weakness. Left leg brace, right knee and right ankle braces. Back braces x2 and cane noted with pt.

## 2017-05-03 NOTE — ANESTHESIA RELEASE NOTE
"Anesthesia Release from PACU Note    Patient: Cornel Dick    Procedure(s) Performed: Procedure(s) (LRB):  TRIAL-SPINAL CORD STIMULATOR (N/A)    Anesthesia type: MAC    Post pain: Adequate analgesia    Post assessment: no apparent anesthetic complications    Last Vitals:   Visit Vitals    BP (!) 168/97 (BP Location: Right arm, Patient Position: Sitting, BP Method: Automatic)    Pulse 75    Temp 36.8 °C (98.2 °F) (Skin)    Resp 16    Ht 6' 4" (1.93 m)    Wt 104.7 kg (230 lb 13.2 oz)    SpO2 100%    BMI 28.1 kg/m2       Post vital signs: stable    Level of consciousness: awake    Nausea/Vomiting: no nausea/no vomiting    Complications: none    Airway Patency: patent    Respiratory: unassisted    Cardiovascular: stable and blood pressure at baseline    Hydration: euvolemic  "

## 2017-05-08 ENCOUNTER — OFFICE VISIT (OUTPATIENT)
Dept: PAIN MEDICINE | Facility: CLINIC | Age: 47
End: 2017-05-08
Payer: COMMERCIAL

## 2017-05-08 VITALS
HEIGHT: 76 IN | DIASTOLIC BLOOD PRESSURE: 96 MMHG | SYSTOLIC BLOOD PRESSURE: 149 MMHG | BODY MASS INDEX: 28.01 KG/M2 | HEART RATE: 93 BPM | WEIGHT: 230 LBS | RESPIRATION RATE: 18 BRPM

## 2017-05-08 DIAGNOSIS — M54.16 LUMBAR RADICULOPATHY: Primary | ICD-10-CM

## 2017-05-08 DIAGNOSIS — M47.816 SPONDYLOSIS OF LUMBAR REGION WITHOUT MYELOPATHY OR RADICULOPATHY: ICD-10-CM

## 2017-05-08 DIAGNOSIS — M51.36 DDD (DEGENERATIVE DISC DISEASE), LUMBAR: ICD-10-CM

## 2017-05-08 PROCEDURE — 1160F RVW MEDS BY RX/DR IN RCRD: CPT | Mod: S$GLB,,, | Performed by: ANESTHESIOLOGY

## 2017-05-08 PROCEDURE — 99213 OFFICE O/P EST LOW 20 MIN: CPT | Mod: S$GLB,,, | Performed by: ANESTHESIOLOGY

## 2017-05-08 PROCEDURE — 99999 PR PBB SHADOW E&M-EST. PATIENT-LVL III: CPT | Mod: PBBFAC,,, | Performed by: ANESTHESIOLOGY

## 2017-05-08 RX ORDER — HYDROCODONE BITARTRATE AND ACETAMINOPHEN 5; 325 MG/1; MG/1
1 TABLET ORAL 3 TIMES DAILY PRN
Qty: 90 TABLET | Refills: 0 | Status: SHIPPED | OUTPATIENT
Start: 2017-05-23 | End: 2017-05-08 | Stop reason: SDUPTHER

## 2017-05-08 RX ORDER — HYDROCODONE BITARTRATE AND ACETAMINOPHEN 5; 325 MG/1; MG/1
1 TABLET ORAL 3 TIMES DAILY PRN
Qty: 90 TABLET | Refills: 0 | Status: SHIPPED | OUTPATIENT
Start: 2017-06-21 | End: 2017-07-17 | Stop reason: SDUPTHER

## 2017-05-08 NOTE — PROGRESS NOTES
Chief Pain Complaint:  Low Back Pain, Bilateral Leg Pain (Right > L)    History of Present Illness:  This patient is a 46 y.o. male who presents today complaining of the above noted pain/s. The patient describes the pain as follows.    - duration of pain: > 3 years   - timing: intermittent   - character: aching, shooting  - radiating, dermatomal: extends into bilateral lower extremities across dermatomes, more posteriorly, Right > Left  - antecedent trauma, prior spinal surgery: no prior trauma, no prior spinal surgery   - pertinent negatives: No fever, No chills, No weight loss, No bladder dysfunction, No bowel dysfunction, No saddle anesthesia  - pertinent positives: generalized nonspecific Lower Extremity weakness bilaterally    - medications, other therapies tried (physical therapy, injections):     >> gabapentin, zanaflex, Tramadol, norco    >> Has previously undergone Physical Therapy    >> Has previously undergone spinal injection/s: right L3/L4, L5/S1 TF KAVITA on 1-13-17 and 3-10-17 with only weeks of relief after these injections, [employer] SCS trial on 5-3-17        Imaging / Labs / Studies (reviewed on 5/8/2017):      11/16/16 MRI Lumbar Spine Without Contrast    Narrative Technique:  Multiplanar, multisequence MR images were performed of the lumbar spine obtained without contrast.   Comparison: None.   Results:  Lumbar spine alignment is within normal limits. The vertebral body heights are well maintained, with no fracture.  No marrow signal abnormality suspicious for an infiltrative process.    The conus medullaris terminates at approximately the L1-L2 disk space.  The adjacent soft tissue structures show no significant abnormalities.  There is mild disc desiccation at the L3-L4 and L5-S1 discs.  Minimal disc space narrowing noted at these levels.  L1-L2: No significant central canal or neural foraminal narrowing.  L2-L3: No significant central canal or neural foraminal narrowing.  L3-L4: There is a  "broad-based right foraminal disc extrusion that results in moderate effacement of the exiting L3 nerve root.  No significant central canal narrowing.  L4-L5:  No significant central canal or neural foraminal narrowing.  L5-S1:  Broad-based right paracentral/right foraminal disc protrusion resulting in no significant central canal narrowing.  The right neural foraminal canal is mildly to moderately narrowed.  There is abutment of the exiting L5 nerve root.  No significant effacement of this nerve root.         5/13/16 X-Ray Lumbar Spine Complete 5 View    Narrative Five view lumbar spine.  Findings: Spinal alignment is anatomic.  Mild disc space narrowing and facet arthropathy at L5-S1.  Pedicles appear intact.  No compression fracture or subluxation.           Review of Systems:  CONSTITUTIONAL: patient denies any fever, chills, or weight loss  SKIN: patient denies any rash or itching  RESPIRATORY: patient denies having any shortness of breath  GASTROINTESTINAL: patient denies having any diarrhea, constipation, or bowel incontinence  GENITOURINARY: patient denies having any abnormal bladder function    MUSCULOSKELETAL:  - patient complains of the above noted pain/s (see chief pain complaint)    NEUROLOGICAL:   - pain as above  - strength in Lower extremities is decreased, BILATERALLY  - sensation in Lower extremities is abnormal, BILATERALLY  - patient denies any loss of bowel or bladder control      PSYCHIATRIC: patient denies any change in mood      Physical Exam:    Vitals:  BP (!) 149/96 (BP Location: Right arm, Patient Position: Sitting, BP Method: Automatic)  Pulse 93  Resp 18  Ht 6' 4" (1.93 m)  Wt 104.3 kg (230 lb)  BMI 28 kg/m2   (reviewed on 5/8/2017)    General: alert and oriented, in no apparent distress  Gait: antalgic gait, uses a cane to assist ambulation  Skin: No rashes, No discoloration, No obvious lesions  HEENT: EOMI  Respiratory: respirations nonlabored    Musculoskeletal:  - Any pain on " "flexion, extension, rotation:    >> pain on extension and rotation  - Straight Leg Raise:     >> LEFT :: negative    >> RIGHT :: negative  - Any tenderness to palpation across paraspinal muscles, joints, bursae:     >> across lumbar paraspinals    Neuro:  - Extremity Strength:     >> LEFT :: decreased throughout, worse with dorsiflexion/ plantar flexion    >> RIGHT :: 5/5  - Extremity Reflexes:    >> LEFT  :: 2+    >> RIGHT :: 2+  - Sensory (sensation to light touch):    >> LEFT :: intact    >> RIGHT :: intact     Psych:  Mood and affect is appropriate      Assessment:  Lumbar Radiculopathy  Lumbar Spondylosis   Lumbar DDD      Plan:  - Patient returns for follow-up. He has bilateral leg pain (R>L) across dermatomes, primarily extending along the posterior aspects of the legs, he has low back pain as well.  MRI shows DDD at L3/4 and L5/S1 with right side extension and encroachment of nerve roots, NF narrowing, and facet disease as well. There seems to be no indication for left side radicular pain based on the MRI. He has been seen by Neurosurgery at the [facility], who recommended spinal injections (records attached into "Media").   - S/p right L3/L4, L5/S1 TF KAVITA on 1-13-17 and 3-10-17 with only weeks of relief after both injections.  - S/p spinal cord stimulator trial on 5-3-17 with [employer] dual 16 contact percutaneous leads.  He reports significant pain relief during the trial period, percentage of relief is a little unclear but ultimately seems to have been > 50%.  He reports better sleep, lower consumption of pain medication during trial, improved function.  He expresses interest in moving forward with permanent placement.  I advise that he can f/u at [facility] which he has already had an initial consultation.  Leads pulled today, insertion sites are clean and dry, no erythema or tenderness.  I remove in sterile fashion, clean with ChloraPrep, bandaged.  He is to finish ABX, shower only for the " next few days.  - I will refill the norco x 2 months and he can f/u in 10 weeks.  - I discussed in detail the risks, benefits, and alternatives to any and all potential treatment options.  All questions and concerns were fully addressed today in clinic.      >>Pain Disability Questionnaire:  12/20/2016 :: 83    >>Opioid Risk Tool:  12/20/2016 :: 1    >>UDS:  12/20/2016 :: negative (at initial consult)    >> Pain Disability Index:  2/6/2017 :: 39  3/20/2017 :: 43  4/6/2017 :: 46  5/8/2017 :: 35

## 2017-05-08 NOTE — MR AVS SNAPSHOT
[facility]  [address] [place] [address]  Phone: [phone]  Fax: [phone]                  Cornel Dick   2017 12:40 PM   Office Visit    Description:  Male : 1970   Provider:  Greg Puente MD   Department:  [facility]           Reason for Visit     Low-back Pain           Diagnoses this Visit        Comments    Lumbar radiculopathy    -  Primary            To Do List           Future Appointments        Provider Department Dept Phone    2017 1:20 PM Magda Bynum PA-C [facility] [phone]      Goals (5 Years of Data)     None       These Medications        Disp Refills Start End    hydrocodone-acetaminophen 5-325mg (NORCO) 5-325 mg per tablet 90 tablet 0 2017    Take 1 tablet by mouth 3 (three) times daily as needed for Pain. - Oral    Pharmacy: [facility] [address] [place] [address] #: [phone]         [facility]     [facility] -  Assistance  Unless otherwise directed by your provider, please contact [facility], our nurse care line that is available for  assistance.     Registered nurses in the [facility] provide: appointment scheduling, clinical advisement, health education, and other advisory services.  Call: [phone] (toll free)               Medications           Message regarding Medications     Verify the changes and/or additions to your medication regime listed below are the same as discussed with your clinician today.  If any of these changes or additions are incorrect, please notify your healthcare provider.             Verify that the below list of medications is an accurate representation of the medications you are currently taking.  If none reported, the list may be blank. If incorrect, please contact your healthcare provider. Carry this list with you in case of emergency.          "  Current Medications     ACETAMINOPHEN (ARTHRITIS PAIN RELIEVER ORAL) Take by mouth.    amlodipine (NORVASC) 10 MG tablet Take by mouth every evening.     cephALEXin (KEFLEX) 500 MG capsule Take 1 capsule (500 mg total) by mouth 3 (three) times daily.    efinaconazole (JUBLIA) 10 % Lilibeth Apply 1 application topically once daily.    gabapentin (NEURONTIN) 400 MG capsule Ttake 1 capsule QAM and 2 capsules at night.    hydrocodone-acetaminophen 5-325mg (NORCO) 5-325 mg per tablet Starting on Jun 21, 2017. Take 1 tablet by mouth 3 (three) times daily as needed for Pain.    lisinopril-hydrochlorothiazide (PRINZIDE,ZESTORETIC) 20-12.5 mg per tablet     MV-MN/FA/LYCOPENE/LUT/HB#178 (HUBERT MULTIVITAMIN FOR MEN ORAL) Take by mouth.    oxaprozin (DAYPRO) 600 mg tablet Take 2 tablets (1,200 mg total) by mouth once daily.    pravastatin (PRAVACHOL) 40 MG tablet Take 40 mg by mouth every evening.     tizanidine (ZANAFLEX) 4 MG tablet Take 1 tablet (4 mg total) by mouth nightly as needed.    tramadol (ULTRAM) 50 mg tablet Take 50 mg by mouth every 6 (six) hours as needed for Pain.           Clinical Reference Information           Your Vitals Were     BP Pulse Resp Height Weight BMI    149/96 (BP Location: Right arm, Patient Position: Sitting, BP Method: Automatic) 93 18 6' 4" (1.93 m) 104.3 kg (230 lb) 28 kg/m2      Blood Pressure          Most Recent Value    BP  (!)  149/96      Allergies as of 5/8/2017     Chocolate Flavor      Immunizations Administered on Date of Encounter - 5/8/2017     None      Language Assistance Services     ATTENTION: Language assistance services are available, free of charge. Please call [phone].      ATENCIÓN: Si nusrat sifuentes, tiene a celaya disposición servicios gratuitos de asistencia lingüística. Llame al [phone].     CHÚ Ý: N?u b?n nói Ti?ng Vi?t, có các d?ch v? h? tr? ngôn ng? mi?n phí dành cho b?n. G?i s? [phone].         [facility] complies with applicable " Federal civil rights laws and does not discriminate on the basis of race, color, national origin, age, disability, or sex.

## 2017-05-18 ENCOUNTER — PATIENT MESSAGE (OUTPATIENT)
Dept: ORTHOPEDICS | Facility: CLINIC | Age: 47
End: 2017-05-18

## 2017-05-20 RX ORDER — TRAMADOL HYDROCHLORIDE 50 MG/1
TABLET ORAL
Qty: 50 TABLET | Refills: 0 | OUTPATIENT
Start: 2017-05-20

## 2017-06-19 DIAGNOSIS — M47.816 SPONDYLOSIS OF LUMBAR REGION WITHOUT MYELOPATHY OR RADICULOPATHY: ICD-10-CM

## 2017-06-19 DIAGNOSIS — M54.16 LUMBAR RADICULITIS: ICD-10-CM

## 2017-06-19 RX ORDER — GABAPENTIN 400 MG/1
CAPSULE ORAL
Qty: 90 CAPSULE | Refills: 0 | Status: SHIPPED | OUTPATIENT
Start: 2017-06-19 | End: 2017-06-23 | Stop reason: SDUPTHER

## 2017-06-21 RX ORDER — MELOXICAM 15 MG/1
TABLET ORAL
Qty: 30 TABLET | Refills: 0 | Status: SHIPPED | OUTPATIENT
Start: 2017-06-21 | End: 2017-06-23

## 2017-06-22 ENCOUNTER — PATIENT MESSAGE (OUTPATIENT)
Dept: ORTHOPEDICS | Facility: CLINIC | Age: 47
End: 2017-06-22

## 2017-06-23 DIAGNOSIS — M47.816 SPONDYLOSIS OF LUMBAR REGION WITHOUT MYELOPATHY OR RADICULOPATHY: ICD-10-CM

## 2017-06-23 DIAGNOSIS — M54.16 LUMBAR RADICULITIS: ICD-10-CM

## 2017-06-23 RX ORDER — GABAPENTIN 400 MG/1
CAPSULE ORAL
Qty: 90 CAPSULE | Refills: 1 | Status: SHIPPED | OUTPATIENT
Start: 2017-06-23 | End: 2017-07-17

## 2017-06-23 RX ORDER — OXAPROZIN 600 MG/1
1200 TABLET, FILM COATED ORAL DAILY
Qty: 60 TABLET | Refills: 2 | Status: SHIPPED | OUTPATIENT
Start: 2017-06-23 | End: 2017-08-06 | Stop reason: SDUPTHER

## 2017-07-17 ENCOUNTER — OFFICE VISIT (OUTPATIENT)
Dept: PAIN MEDICINE | Facility: CLINIC | Age: 47
End: 2017-07-17
Payer: COMMERCIAL

## 2017-07-17 VITALS
HEIGHT: 76 IN | HEART RATE: 101 BPM | WEIGHT: 220 LBS | BODY MASS INDEX: 26.79 KG/M2 | SYSTOLIC BLOOD PRESSURE: 141 MMHG | DIASTOLIC BLOOD PRESSURE: 79 MMHG

## 2017-07-17 DIAGNOSIS — M54.16 LUMBAR RADICULOPATHY: Primary | ICD-10-CM

## 2017-07-17 PROCEDURE — 99213 OFFICE O/P EST LOW 20 MIN: CPT | Mod: S$GLB,,, | Performed by: ANESTHESIOLOGY

## 2017-07-17 PROCEDURE — 99999 PR PBB SHADOW E&M-EST. PATIENT-LVL III: CPT | Mod: PBBFAC,,, | Performed by: ANESTHESIOLOGY

## 2017-07-17 RX ORDER — HYDROCODONE BITARTRATE AND ACETAMINOPHEN 5; 325 MG/1; MG/1
1 TABLET ORAL 2 TIMES DAILY PRN
Qty: 60 TABLET | Refills: 0 | Status: SHIPPED | OUTPATIENT
Start: 2017-07-17 | End: 2017-08-14 | Stop reason: SDUPTHER

## 2017-07-17 RX ORDER — GABAPENTIN 600 MG/1
600 TABLET ORAL 3 TIMES DAILY
Qty: 90 TABLET | Refills: 0 | Status: SHIPPED | OUTPATIENT
Start: 2017-07-17 | End: 2017-08-14 | Stop reason: SDUPTHER

## 2017-07-17 NOTE — PROGRESS NOTES
Chief Pain Complaint:  Low Back Pain, Bilateral Leg Pain (Right > L) follow up spinal stimulator placement    History of Present Illness:  This patient is a 47 y.o. male who presents today complaining of the above noted pain/s. The patient describes the pain as follows.    - duration of pain: > 3 years   - timing: intermittent   - character: aching, shooting  - radiating, dermatomal: extends into bilateral lower extremities across dermatomes, more posteriorly, Right > Left  - antecedent trauma, prior spinal surgery: no prior trauma, no prior spinal surgery   - pertinent negatives: No fever, No chills, No weight loss, No bladder dysfunction, No bowel dysfunction, No saddle anesthesia  - pertinent positives: generalized nonspecific Lower Extremity weakness bilaterally    - medications, other therapies tried (physical therapy, injections):     >> gabapentin, zanaflex, Tramadol, norco    >> Has previously undergone Physical Therapy    >> Has previously undergone spinal injection/s: right L3/L4, L5/S1 TF KAVITA on 1-13-17 and 3-10-17 with only weeks of relief after these injections, [place] [facility] SCS trial on 5-3-17 followed by permanent implantation thereafter        Imaging / Labs / Studies (reviewed on 7/17/2017):      11/16/16 MRI Lumbar Spine Without Contrast    Narrative Technique:  Multiplanar, multisequence MR images were performed of the lumbar spine obtained without contrast.   Comparison: None.   Results:  Lumbar spine alignment is within normal limits. The vertebral body heights are well maintained, with no fracture.  No marrow signal abnormality suspicious for an infiltrative process.    The conus medullaris terminates at approximately the L1-L2 disk space.  The adjacent soft tissue structures show no significant abnormalities.  There is mild disc desiccation at the L3-L4 and L5-S1 discs.  Minimal disc space narrowing noted at these levels.  L1-L2: No significant central canal or neural foraminal  "narrowing.  L2-L3: No significant central canal or neural foraminal narrowing.  L3-L4: There is a broad-based right foraminal disc extrusion that results in moderate effacement of the exiting L3 nerve root.  No significant central canal narrowing.  L4-L5:  No significant central canal or neural foraminal narrowing.  L5-S1:  Broad-based right paracentral/right foraminal disc protrusion resulting in no significant central canal narrowing.  The right neural foraminal canal is mildly to moderately narrowed.  There is abutment of the exiting L5 nerve root.  No significant effacement of this nerve root.         5/13/16 X-Ray Lumbar Spine Complete 5 View    Narrative Five view lumbar spine.  Findings: Spinal alignment is anatomic.  Mild disc space narrowing and facet arthropathy at L5-S1.  Pedicles appear intact.  No compression fracture or subluxation.           Review of Systems:  CONSTITUTIONAL: patient denies any fever, chills, or weight loss  SKIN: patient denies any rash or itching  RESPIRATORY: patient denies having any shortness of breath  GASTROINTESTINAL: patient denies having any diarrhea, constipation, or bowel incontinence  GENITOURINARY: patient denies having any abnormal bladder function    MUSCULOSKELETAL:  - patient complains of the above noted pain/s (see chief pain complaint)    NEUROLOGICAL:   - pain as above  - strength in Lower extremities is decreased, BILATERALLY  - sensation in Lower extremities is abnormal, BILATERALLY  - patient denies any loss of bowel or bladder control      PSYCHIATRIC: patient denies any change in mood      Physical Exam:    Vitals:  BP (!) 141/79   Pulse 101   Ht 6' 4" (1.93 m)   Wt 99.8 kg (220 lb)   BMI 26.78 kg/m²    (reviewed on 7/17/2017)    General: alert and oriented, in no apparent distress  Gait: antalgic gait  Skin: No rashes, No discoloration, No obvious lesions  HEENT: EOMI  Respiratory: respirations nonlabored    Musculoskeletal:  - Any pain on flexion, " "extension, rotation:    >> pain on extension and rotation  - Straight Leg Raise:     >> LEFT :: negative    >> RIGHT :: negative  - Any tenderness to palpation across paraspinal muscles, joints, bursae:     >> across lumbar paraspinals    Neuro:  - Extremity Strength:     >> LEFT :: decreased throughout, worse with dorsiflexion/ plantar flexion    >> RIGHT :: 5/5  - Extremity Reflexes:    >> LEFT  :: 2+    >> RIGHT :: 2+  - Sensory (sensation to light touch):    >> LEFT :: intact    >> RIGHT :: intact     Psych:  Mood and affect is appropriate      Assessment:  Lumbar Radiculopathy  Lumbar Spondylosis   Lumbar DDD      Plan:  - Patient returns for follow-up. He has bilateral leg pain (R>L) across dermatomes, primarily extending along the posterior aspects of the legs, he has low back pain as well.  MRI shows DDD at L3/4 and L5/S1 with right side extension and encroachment of nerve roots, NF narrowing, and facet disease as well. There seems to be no indication for left side radicular pain based on the MRI. He has been seen by Neurosurgery at the [facility] [place] [place], who recommended spinal injections (records attached into "Media").   - S/p right L3/L4, L5/S1 TF KAVITA on 1-13-17 and 3-10-17 with only weeks of relief after both injections.  - S/p spinal cord stimulator trial on 5-3-17 with [email or web address] dual 16 contact percutaneous leads.  He went on to have the SCS permanently implanted at the [facility].  - I will refill the norco 5/325, increase negrito dose, recommend he use a compounded pain cream at the surgical sites.  Return to clinic in 4 weeks.  - I discussed in detail the risks, benefits, and alternatives to any and all potential treatment options.  All questions and concerns were fully addressed today in clinic.      >>Pain Disability Questionnaire:  12/20/2016 :: 83    >>Opioid Risk Tool:  12/20/2016 :: 1    >>UDS:  12/20/2016 :: negative (at initial consult)    >> Pain Disability Index:  2/6/2017 :: 39  3/20/2017 " :: 43  4/6/2017 :: 46  5/8/2017 :: 35  7/17/2017 :: 50

## 2017-08-04 ENCOUNTER — PATIENT MESSAGE (OUTPATIENT)
Dept: RHEUMATOLOGY | Facility: CLINIC | Age: 47
End: 2017-08-04

## 2017-08-04 ENCOUNTER — PATIENT MESSAGE (OUTPATIENT)
Dept: ORTHOPEDICS | Facility: CLINIC | Age: 47
End: 2017-08-04

## 2017-08-04 RX ORDER — CELECOXIB 200 MG/1
200 CAPSULE ORAL 2 TIMES DAILY
Qty: 60 CAPSULE | Refills: 0 | OUTPATIENT
Start: 2017-08-04

## 2017-08-04 NOTE — TELEPHONE ENCOUNTER
Daypro given by  at orthopedic clinic is also an NSAID. So celebrex should not be taken with it. If that is not working, please contact  to d/c daypro and prescribe Celebrex. Thanks.

## 2017-08-06 RX ORDER — OXAPROZIN 600 MG/1
1200 TABLET, FILM COATED ORAL DAILY
Qty: 60 TABLET | Refills: 2 | Status: SHIPPED | OUTPATIENT
Start: 2017-08-06 | End: 2017-08-10 | Stop reason: ALTCHOICE

## 2017-08-10 ENCOUNTER — TELEPHONE (OUTPATIENT)
Dept: RHEUMATOLOGY | Facility: CLINIC | Age: 47
End: 2017-08-10

## 2017-08-10 DIAGNOSIS — M15.9 PRIMARY OSTEOARTHRITIS INVOLVING MULTIPLE JOINTS: Primary | ICD-10-CM

## 2017-08-10 RX ORDER — CELECOXIB 200 MG/1
200 CAPSULE ORAL 2 TIMES DAILY
Qty: 60 CAPSULE | Refills: 3 | Status: SHIPPED | OUTPATIENT
Start: 2017-08-10 | End: 2017-09-18 | Stop reason: SDUPTHER

## 2017-08-10 NOTE — TELEPHONE ENCOUNTER
Spoke with pt and advised him that the Celebrex was denied because he was taking the oxaprozin. Pt states that he did received that message and has not been taking the Celebrex and now his hands have been hurting. Advised him the message also stated in that case he should contact Ms. Mehta to discuss other options. Pt states that he would prefer the Celebrex to come from  since the Celebrex is for his arthritis and Dr. BIRCH treats his arthritis. Please Advise.

## 2017-08-10 NOTE — TELEPHONE ENCOUNTER
Spoke with pt and advised him that Dr. BIRCH refilled the celebrex and he needs to stop taking the Daypro. Pt verbalized understanding.

## 2017-08-10 NOTE — TELEPHONE ENCOUNTER
----- Message from Anjanaadi Arenas sent at 8/10/2017  9:37 AM CDT -----  Contact: Patient  1. What is the name of the medication you are requesting? Celebrex  2. What is the dose? 200mg  3. How do you take the medication? Orally, topically, etc? orally  4. How often do you take this medication? Twice daily  5. Do you need a 30 day or 90 day supply? 30  6. How many refills are you requesting? Up to provider  7. What is your preferred pharmacy and location of the pharmacy? [facility]  8. Who can we contact with further questions? Patient at [phone]

## 2017-08-14 ENCOUNTER — OFFICE VISIT (OUTPATIENT)
Dept: PAIN MEDICINE | Facility: CLINIC | Age: 47
End: 2017-08-14
Payer: COMMERCIAL

## 2017-08-14 VITALS
DIASTOLIC BLOOD PRESSURE: 86 MMHG | HEIGHT: 76 IN | SYSTOLIC BLOOD PRESSURE: 146 MMHG | RESPIRATION RATE: 16 BRPM | HEART RATE: 84 BPM | BODY MASS INDEX: 26.79 KG/M2 | WEIGHT: 220 LBS

## 2017-08-14 DIAGNOSIS — M47.816 SPONDYLOSIS OF LUMBAR REGION WITHOUT MYELOPATHY OR RADICULOPATHY: ICD-10-CM

## 2017-08-14 DIAGNOSIS — M54.16 LUMBAR RADICULOPATHY: Primary | ICD-10-CM

## 2017-08-14 PROCEDURE — 99214 OFFICE O/P EST MOD 30 MIN: CPT | Mod: S$GLB,,, | Performed by: ANESTHESIOLOGY

## 2017-08-14 PROCEDURE — 99999 PR PBB SHADOW E&M-EST. PATIENT-LVL III: CPT | Mod: PBBFAC,,, | Performed by: ANESTHESIOLOGY

## 2017-08-14 PROCEDURE — 3008F BODY MASS INDEX DOCD: CPT | Mod: S$GLB,,, | Performed by: ANESTHESIOLOGY

## 2017-08-14 RX ORDER — GABAPENTIN 600 MG/1
600 TABLET ORAL 3 TIMES DAILY
Qty: 90 TABLET | Refills: 0 | Status: SHIPPED | OUTPATIENT
Start: 2017-08-14 | End: 2017-08-14 | Stop reason: SDUPTHER

## 2017-08-14 RX ORDER — HYDROCODONE BITARTRATE AND ACETAMINOPHEN 5; 325 MG/1; MG/1
1 TABLET ORAL 2 TIMES DAILY PRN
Qty: 60 TABLET | Refills: 0 | Status: SHIPPED | OUTPATIENT
Start: 2017-09-13 | End: 2017-10-13

## 2017-08-14 RX ORDER — GABAPENTIN 600 MG/1
600 TABLET ORAL 3 TIMES DAILY
Qty: 90 TABLET | Refills: 2 | Status: SHIPPED | OUTPATIENT
Start: 2017-08-14 | End: 2017-10-16 | Stop reason: SDUPTHER

## 2017-08-14 RX ORDER — HYDROCODONE BITARTRATE AND ACETAMINOPHEN 5; 325 MG/1; MG/1
1 TABLET ORAL 2 TIMES DAILY PRN
Qty: 60 TABLET | Refills: 0 | Status: SHIPPED | OUTPATIENT
Start: 2017-08-14 | End: 2017-08-14 | Stop reason: SDUPTHER

## 2017-08-14 NOTE — PROGRESS NOTES
Chief Pain Complaint:  Low Back Pain, Bilateral Leg Pain (Right > L) follow up spinal stimulator placement    History of Present Illness:  This patient is a 47 y.o. male who presents today complaining of the above noted pain/s. The patient describes the pain as follows.    - duration of pain: > 3 years   - timing: intermittent   - character: aching, shooting  - radiating, dermatomal: extends into bilateral lower extremities across dermatomes, more posteriorly, Right > Left  - antecedent trauma, prior spinal surgery: no prior trauma, no prior spinal surgery   - pertinent negatives: No fever, No chills, No weight loss, No bladder dysfunction, No bowel dysfunction, No saddle anesthesia  - pertinent positives: generalized nonspecific Lower Extremity weakness bilaterally    - medications, other therapies tried (physical therapy, injections):     >> gabapentin, zanaflex, Tramadol, norco    >> Has previously undergone Physical Therapy    >> Has previously undergone spinal injection/s: right L3/L4, L5/S1 TF KAVITA on 1-13-17 and 3-10-17 with only weeks of relief after these injections, [place] Sci SCS trial on 5-3-17 followed by permanent implantation thereafter        Imaging / Labs / Studies (reviewed on 8/14/2017):      11/16/16 MRI Lumbar Spine Without Contrast    Narrative Technique:  Multiplanar, multisequence MR images were performed of the lumbar spine obtained without contrast.   Comparison: None.   Results:  Lumbar spine alignment is within normal limits. The vertebral body heights are well maintained, with no fracture.  No marrow signal abnormality suspicious for an infiltrative process.    The conus medullaris terminates at approximately the L1-L2 disk space.  The adjacent soft tissue structures show no significant abnormalities.  There is mild disc desiccation at the L3-L4 and L5-S1 discs.  Minimal disc space narrowing noted at these levels.  L1-L2: No significant central canal or neural foraminal  "narrowing.  L2-L3: No significant central canal or neural foraminal narrowing.  L3-L4: There is a broad-based right foraminal disc extrusion that results in moderate effacement of the exiting L3 nerve root.  No significant central canal narrowing.  L4-L5:  No significant central canal or neural foraminal narrowing.  L5-S1:  Broad-based right paracentral/right foraminal disc protrusion resulting in no significant central canal narrowing.  The right neural foraminal canal is mildly to moderately narrowed.  There is abutment of the exiting L5 nerve root.  No significant effacement of this nerve root.         5/13/16 X-Ray Lumbar Spine Complete 5 View    Narrative Five view lumbar spine.  Findings: Spinal alignment is anatomic.  Mild disc space narrowing and facet arthropathy at L5-S1.  Pedicles appear intact.  No compression fracture or subluxation.           Review of Systems:  CONSTITUTIONAL: patient denies any fever, chills, or weight loss  SKIN: patient denies any rash or itching  RESPIRATORY: patient denies having any shortness of breath  GASTROINTESTINAL: patient denies having any diarrhea, constipation, or bowel incontinence  GENITOURINARY: patient denies having any abnormal bladder function    MUSCULOSKELETAL:  - patient complains of the above noted pain/s (see chief pain complaint)    NEUROLOGICAL:   - pain as above  - strength in Lower extremities is decreased, BILATERALLY  - sensation in Lower extremities is abnormal, BILATERALLY  - patient denies any loss of bowel or bladder control      PSYCHIATRIC: patient denies any change in mood      Physical Exam:  Vitals:  BP (!) 146/86 (BP Location: Right arm, Patient Position: Sitting, BP Method: Large (Automatic))   Pulse 84   Resp 16   Ht 6' 4" (1.93 m)   Wt 99.8 kg (220 lb)   BMI 26.78 kg/m²    (reviewed on 8/14/2017)    General: alert and oriented, in no apparent distress  Gait: antalgic gait  Skin: No rashes, No discoloration, No obvious lesions  HEENT: " "EOMI  Respiratory: respirations nonlabored    Musculoskeletal:  - Any pain on flexion, extension, rotation:    >> pain on extension and rotation  - Straight Leg Raise:     >> LEFT :: negative    >> RIGHT :: negative  - Any tenderness to palpation across paraspinal muscles, joints, bursae:     >> across lumbar paraspinals    Neuro:  - Extremity Strength:     >> LEFT :: decreased throughout, worse with dorsiflexion/ plantar flexion    >> RIGHT :: 5/5     Psych:  Mood and affect is appropriate      Assessment:  Lumbar Radiculopathy  Lumbar Spondylosis   Lumbar DDD      Plan:  - Patient returns for follow-up. He has bilateral leg pain (R>L) across dermatomes, primarily extending along the posterior aspects of the legs, he has low back pain as well.  MRI shows DDD at L3/4 and L5/S1 with right side extension and encroachment of nerve roots, NF narrowing, and facet disease as well. There seems to be no indication for left side radicular pain based on the MRI. [place] [facility] [place] injections (records attached into "Media").   - S/p right L3/L4, L5/S1 TF KAVITA on 1-13-17 and 3-10-17 with only weeks of relief after both injections.  - S/p spinal cord stimulator trial on 5-3-17 with [employer] dual 16 contact percutaneous leads.  He went on to have the SCS permanently implanted at the [facility].  - Patient reports that he continues to take the Norco and gabapentin, along with a compounded pain cream.  I will increase the gabapentin prior visit.  I will try to switch the gabapentin to her rise, we will see how much this will cost, if the horizon is current and I will prescribe 600 mg twice a day.  LA  is reviewed and patient felt 16 Norco 5/325 tabs on 7/17/17.  I will check a urine drug screen today.   - Return to clinic in approximately 8 weeks.  - I discussed in detail the risks, benefits, and alternatives to any and all potential treatment options.  All " questions and concerns were fully addressed today in clinic.      >>Pain Disability Questionnaire:  12/20/2016 :: 83    >>Opioid Risk Tool:  12/20/2016 :: 1    >>UDS:  12/20/2016 :: negative (at initial consult)  8/14/2017 :: pending    >> Pain Disability Index:  2/6/2017 :: 39  3/20/2017 :: 43  4/6/2017 :: 46  5/8/2017 :: 35  7/17/2017 :: 50  8/14/2017 :: 55

## 2017-09-18 ENCOUNTER — LAB VISIT (OUTPATIENT)
Dept: LAB | Facility: HOSPITAL | Age: 47
End: 2017-09-18
Attending: INTERNAL MEDICINE
Payer: COMMERCIAL

## 2017-09-18 ENCOUNTER — OFFICE VISIT (OUTPATIENT)
Dept: RHEUMATOLOGY | Facility: CLINIC | Age: 47
End: 2017-09-18
Payer: COMMERCIAL

## 2017-09-18 VITALS
SYSTOLIC BLOOD PRESSURE: 151 MMHG | HEART RATE: 81 BPM | BODY MASS INDEX: 27.43 KG/M2 | WEIGHT: 225.31 LBS | DIASTOLIC BLOOD PRESSURE: 88 MMHG

## 2017-09-18 DIAGNOSIS — M15.9 PRIMARY OSTEOARTHRITIS INVOLVING MULTIPLE JOINTS: ICD-10-CM

## 2017-09-18 DIAGNOSIS — M15.9 PRIMARY OSTEOARTHRITIS INVOLVING MULTIPLE JOINTS: Primary | ICD-10-CM

## 2017-09-18 LAB
ALBUMIN SERPL BCP-MCNC: 3.7 G/DL
ALP SERPL-CCNC: 61 U/L
ALT SERPL W/O P-5'-P-CCNC: 16 U/L
ANION GAP SERPL CALC-SCNC: 12 MMOL/L
AST SERPL-CCNC: 22 U/L
BILIRUB SERPL-MCNC: 0.2 MG/DL
BUN SERPL-MCNC: 14 MG/DL
CALCIUM SERPL-MCNC: 9.5 MG/DL
CHLORIDE SERPL-SCNC: 105 MMOL/L
CO2 SERPL-SCNC: 28 MMOL/L
CREAT SERPL-MCNC: 1.3 MG/DL
EST. GFR  (AFRICAN AMERICAN): >60 ML/MIN/1.73 M^2
EST. GFR  (NON AFRICAN AMERICAN): >60 ML/MIN/1.73 M^2
GLUCOSE SERPL-MCNC: 101 MG/DL
POTASSIUM SERPL-SCNC: 3.5 MMOL/L
PROT SERPL-MCNC: 7.7 G/DL
SODIUM SERPL-SCNC: 145 MMOL/L

## 2017-09-18 PROCEDURE — 80053 COMPREHEN METABOLIC PANEL: CPT | Mod: PO

## 2017-09-18 PROCEDURE — 36415 COLL VENOUS BLD VENIPUNCTURE: CPT | Mod: PO

## 2017-09-18 PROCEDURE — 3008F BODY MASS INDEX DOCD: CPT | Mod: S$GLB,,, | Performed by: INTERNAL MEDICINE

## 2017-09-18 PROCEDURE — 99999 PR PBB SHADOW E&M-EST. PATIENT-LVL III: CPT | Mod: PBBFAC,,, | Performed by: INTERNAL MEDICINE

## 2017-09-18 PROCEDURE — 99214 OFFICE O/P EST MOD 30 MIN: CPT | Mod: S$GLB,,, | Performed by: INTERNAL MEDICINE

## 2017-09-18 RX ORDER — CELECOXIB 200 MG/1
200 CAPSULE ORAL 2 TIMES DAILY PRN
Qty: 180 CAPSULE | Refills: 2 | Status: SHIPPED | OUTPATIENT
Start: 2017-09-18

## 2017-09-18 NOTE — PROGRESS NOTES
RHEUMATOLOGY CLINIC FOLLOW UP VISIT  Chief complaints:-  My back hurts sometime.      HPI:-  Cornel Lam a 47 y.o. pleasant male comes in for a follow up visit with above chief complaints. He has very complex past medical history including severe osteoarthritis of knee joint and multiple soft tissue injuries in the knee joints.  He reports significant change in his symptoms since taking bid celebrex and getting spinal cord stimulator for his back pain. He denies any joint pain today.     Review of Systems   Constitutional: Negative for chills, fever and malaise/fatigue.   HENT: Negative for nosebleeds and sore throat.    Eyes: Negative for blurred vision, photophobia and redness.   Respiratory: Negative for cough, sputum production and shortness of breath.    Cardiovascular: Negative for chest pain and leg swelling.   Gastrointestinal: Negative for abdominal pain, constipation and diarrhea.   Genitourinary: Negative for dysuria, frequency and urgency.   Musculoskeletal: Negative for back pain, falls, joint pain, myalgias and neck pain.   Skin: Negative for itching and rash.   Neurological: Negative for dizziness, tremors, seizures, loss of consciousness, weakness and headaches.   Endo/Heme/Allergies: Negative for environmental allergies. Does not bruise/bleed easily.   Psychiatric/Behavioral: Negative for hallucinations and memory loss. The patient does not have insomnia.        Past Medical History:   Diagnosis Date    Arthritis     HBP (high blood pressure)        Past Surgical History:   Procedure Laterality Date    KNEE ARTHROSCOPY Left         Social History   Substance Use Topics    Smoking status: Never Smoker    Smokeless tobacco: Never Used    Alcohol use No       History reviewed. No pertinent family history.    Review of patient's allergies indicates:   Allergen Reactions    Chocolate flavor Swelling           Physical  examination:-    Vitals:    09/18/17 1240   BP: (!) 151/88   Pulse: 81   Weight: 102.2 kg (225 lb 5 oz)   PainSc: 0-No pain       Physical Exam   Constitutional: He is oriented to person, place, and time and well-developed, well-nourished, and in no distress. No distress.   HENT:   Head: Normocephalic and atraumatic.   Mouth/Throat: Oropharynx is clear and moist.   Eyes: Conjunctivae and EOM are normal. Pupils are equal, round, and reactive to light. Right eye exhibits no discharge. Left eye exhibits no discharge. No scleral icterus.   Neck: Normal range of motion. Neck supple.   Cardiovascular: Normal rate and intact distal pulses.    Pulmonary/Chest: Effort normal. No respiratory distress. He exhibits no tenderness.   Abdominal: Soft. There is no tenderness.   Musculoskeletal:   No synovitis in small joints of hands or feet.  No synovitis or effusion over large joint. Significant crepitus present  No sclerodactyly.  No telangiectasias.   Lymphadenopathy:     He has no cervical adenopathy.   Neurological: He is alert and oriented to person, place, and time. Gait normal.   Skin: Skin is warm. No rash noted. He is not diaphoretic. No erythema. No pallor.   Psychiatric: Mood and affect normal.       Labs:-  Results for MEGGAN CRUZ (MRN [id number]) as of 2/28/2017 16:21   Ref. Range 1/31/2017 16:29   WBC Latest Ref Range: 3.90 - 12.70 K/uL 7.60   RBC Latest Ref Range: 4.60 - 6.20 M/uL 5.01   Hemoglobin Latest Ref Range: 14.0 - 18.0 g/dL 12.4 (L)   Hematocrit Latest Ref Range: 40.0 - 54.0 % 37.7 (L)   MCV Latest Ref Range: 82 - 98 fL 75 (L)   MCH Latest Ref Range: 27.0 - 31.0 pg 24.8 (L)   MCHC Latest Ref Range: 32.0 - 36.0 % 32.9   RDW Latest Ref Range: 11.5 - 14.5 % 15.6 (H)   Platelets Latest Ref Range: 150 - 350 K/uL 224   MPV Latest Ref Range: 9.2 - 12.9 fL 9.7   Gran% Latest Ref Range: 38.0 - 73.0 % 53.9   Gran # Latest Ref Range: 1.8 - 7.7 K/uL 4.1   Lymph% Latest Ref Range: 18.0 - 48.0 % 22.6   Lymph #  Latest Ref Range: 1.0 - 4.8 K/uL 1.7   Mono% Latest Ref Range: 4.0 - 15.0 % 7.1   Mono # Latest Ref Range: 0.3 - 1.0 K/uL 0.5   Eosinophil% Latest Ref Range: 0.0 - 8.0 % 15.5 (H)   Eos # Latest Ref Range: 0.0 - 0.5 K/uL 1.2 (H)   Basophil% Latest Ref Range: 0.0 - 1.9 % 0.9   Baso # Latest Ref Range: 0.00 - 0.20 K/uL 0.07   Iron Latest Ref Range: 45 - 160 ug/dL 87   TIBC Latest Ref Range: 250 - 450 ug/dL 367   Saturated Iron Latest Ref Range: 20 - 50 % 24   Transferrin Latest Ref Range: 200 - 375 mg/dL 248   Ferritin Latest Ref Range: 20.0 - 300.0 ng/mL 107   Sed Rate Latest Ref Range: 0 - 10 mm/Hr 4   Sodium Latest Ref Range: 136 - 145 mmol/L 142   Potassium Latest Ref Range: 3.5 - 5.1 mmol/L 3.7   Chloride Latest Ref Range: 95 - 110 mmol/L 103   CO2 Latest Ref Range: 23 - 29 mmol/L 28   Anion Gap Latest Ref Range: 8 - 16 mmol/L 11   BUN, Bld Latest Ref Range: 6 - 20 mg/dL 15   Creatinine Latest Ref Range: 0.5 - 1.4 mg/dL 1.3   eGFR if non African American Latest Ref Range: >60 mL/min/1.73 m^2 >60   eGFR if  Latest Ref Range: >60 mL/min/1.73 m^2 >60   Glucose Latest Ref Range: 70 - 110 mg/dL 98   Calcium Latest Ref Range: 8.7 - 10.5 mg/dL 9.5   Alkaline Phosphatase Latest Ref Range: 55 - 135 U/L 44 (L)   Total Protein Latest Ref Range: 6.0 - 8.4 g/dL 7.5   Albumin Latest Ref Range: 3.5 - 5.2 g/dL 3.9   Total Bilirubin Latest Ref Range: 0.1 - 1.0 mg/dL 0.2   AST Latest Ref Range: 10 - 40 U/L 25   ALT Latest Ref Range: 10 - 44 U/L 48 (H)   CRP Latest Ref Range: 0.0 - 8.2 mg/L 2.0   CCP Antibodies Latest Ref Range: <5.0 U/mL 0.7   Rheumatoid Factor Latest Ref Range: 0.0 - 15.0 IU/mL <10.0       Medication List with Changes/Refills   New Medications    BAICALIN-CATECHIN (LIMBREL) 500 MG CAP    Take 500 mg by mouth 2 (two) times daily.   Current Medications    ACETAMINOPHEN (ARTHRITIS PAIN RELIEVER ORAL)    Take by mouth.    AMLODIPINE (NORVASC) 10 MG TABLET    Take by mouth every evening.      EFINACONAZOLE (JUBLIA) 10 % ANITHA    Apply 1 application topically once daily.    GABAPENTIN (NEURONTIN) 600 MG TABLET    Take 1 tablet (600 mg total) by mouth 3 (three) times daily. may cause drowsiness    HYDROCODONE-ACETAMINOPHEN 5-325MG (NORCO) 5-325 MG PER TABLET    Take 1 tablet by mouth 2 (two) times daily as needed for Pain.    LISINOPRIL-HYDROCHLOROTHIAZIDE (PRINZIDE,ZESTORETIC) 20-12.5 MG PER TABLET        MV-MN/FA/LYCOPENE/LUT/HB#178 (HUBERT MULTIVITAMIN FOR MEN ORAL)    Take by mouth.    PRAVASTATIN (PRAVACHOL) 40 MG TABLET    Take 40 mg by mouth every evening.     TIZANIDINE (ZANAFLEX) 4 MG TABLET    Take 1 tablet (4 mg total) by mouth nightly as needed.   Changed and/or Refilled Medications    Modified Medication Previous Medication    CELECOXIB (CELEBREX) 200 MG CAPSULE celecoxib (CELEBREX) 200 MG capsule       Take 1 capsule (200 mg total) by mouth 2 (two) times daily as needed for Pain.    Take 1 capsule (200 mg total) by mouth 2 (two) times daily.     Assessment/Plans:-  Primary osteoarthritis involving multiple joints  Primary OA affecting several joints including spine doing well on Celebrex and spinal cord stimulator. MRI hand was done which ruled out any occult synovitis or tendinitis. Try Limbrel to replace celebrex in future. Check renal function every 6 months while on NSAID therapy.   - celecoxib (CELEBREX) 200 MG capsule; Take 1 capsule (200 mg total) by mouth 2 (two) times daily as needed for Pain.  Dispense: 180 capsule; Refill: 2  - Comprehensive metabolic panel; Standing  - baicalin-catechin (LIMBREL) 500 mg Cap; Take 500 mg by mouth 2 (two) times daily.  Dispense: 60 each; Refill: 12       # RTC in 1 year.     Time spent: 30 minutes in face to face discussion concerning diagnosis, prognosis, review of lab and test results, benefits of treatment as well as management of disease, counseling of patient and coordination of care between various health care providers . Greater than half the  time spent was used for coordination of care and counseling of patient.    Disclaimer: This note was prepared using voice recognition system and is likely to have sound alike errors .  Please call me with any questions

## 2017-09-18 NOTE — PATIENT INSTRUCTIONS
If possible, please use celebrex as needed in order to prevent any kidney injury from prolonged use.

## 2017-09-18 NOTE — ASSESSMENT & PLAN NOTE
Primary OA affecting several joints including spine doing well on Celebrex and spinal cord stimulator. MRI hand was done which ruled out any occult synovitis or tendinitis. Try Limbrel to replace celebrex in future. Check renal function every 6 months while on NSAID therapy.

## 2017-09-29 DIAGNOSIS — M79.18 MYOFASCIAL PAIN: ICD-10-CM

## 2017-10-05 RX ORDER — TIZANIDINE 4 MG/1
TABLET ORAL
Qty: 30 TABLET | Refills: 5 | Status: SHIPPED | OUTPATIENT
Start: 2017-10-05 | End: 2018-02-21 | Stop reason: SDUPTHER

## 2017-10-16 ENCOUNTER — OFFICE VISIT (OUTPATIENT)
Dept: PAIN MEDICINE | Facility: CLINIC | Age: 47
End: 2017-10-16
Payer: COMMERCIAL

## 2017-10-16 VITALS
HEIGHT: 76 IN | BODY MASS INDEX: 27.4 KG/M2 | WEIGHT: 225 LBS | RESPIRATION RATE: 16 BRPM | DIASTOLIC BLOOD PRESSURE: 87 MMHG | SYSTOLIC BLOOD PRESSURE: 145 MMHG | HEART RATE: 95 BPM

## 2017-10-16 DIAGNOSIS — M54.16 LUMBAR RADICULOPATHY: Primary | ICD-10-CM

## 2017-10-16 DIAGNOSIS — M54.16 BILATERAL LUMBAR RADICULOPATHY: ICD-10-CM

## 2017-10-16 DIAGNOSIS — M47.816 SPONDYLOSIS OF LUMBAR REGION WITHOUT MYELOPATHY OR RADICULOPATHY: ICD-10-CM

## 2017-10-16 DIAGNOSIS — M47.817 LUMBOSACRAL SPONDYLOSIS WITHOUT MYELOPATHY: ICD-10-CM

## 2017-10-16 DIAGNOSIS — M51.36 DDD (DEGENERATIVE DISC DISEASE), LUMBAR: ICD-10-CM

## 2017-10-16 DIAGNOSIS — Z96.89 S/P INSERTION OF SPINAL CORD STIMULATOR: ICD-10-CM

## 2017-10-16 PROCEDURE — 99999 PR PBB SHADOW E&M-EST. PATIENT-LVL IV: CPT | Mod: PBBFAC,,, | Performed by: PHYSICIAN ASSISTANT

## 2017-10-16 PROCEDURE — 99214 OFFICE O/P EST MOD 30 MIN: CPT | Mod: S$GLB,,, | Performed by: PHYSICIAN ASSISTANT

## 2017-10-16 RX ORDER — GABAPENTIN 600 MG/1
600 TABLET ORAL 3 TIMES DAILY
Qty: 90 TABLET | Refills: 1 | Status: SHIPPED | OUTPATIENT
Start: 2017-10-16 | End: 2017-12-12

## 2017-10-16 RX ORDER — GABAPENTIN 600 MG/1
600 TABLET ORAL 3 TIMES DAILY
Qty: 90 TABLET | Refills: 1 | Status: SHIPPED | OUTPATIENT
Start: 2017-10-16 | End: 2017-10-16 | Stop reason: SDUPTHER

## 2017-10-16 NOTE — PROGRESS NOTES
Chief Pain Complaint:  Low Back Pain, Bilateral Leg Pain (Right > L)     History of Present Illness:  This patient is a 47 y.o. male who presents today complaining of the above noted pain/s. The patient describes the pain as follows.    - duration of pain: > 3 years   - timing: intermittent   - character: aching, shooting  - radiating, dermatomal: extends into bilateral lower extremities across dermatomes, more posteriorly, Right > Left  - antecedent trauma, prior spinal surgery: no prior trauma, no prior spinal surgery   - pertinent negatives: No fever, No chills, No weight loss, No bladder dysfunction, No bowel dysfunction, No saddle anesthesia  - pertinent positives: generalized nonspecific Lower Extremity weakness bilaterally    - medications, other therapies tried (physical therapy, injections):     >> gabapentin, zanaflex, Tramadol, norco    >> Has previously undergone Physical Therapy    >> Has previously undergone spinal injection/s:    - Right L3/L4, L5/S1 TF KAVITA on 1-13-17 and 3-10-17 with only weeks of relief after these injections   - [place] [facility] SCS trial on 5-3-17 followed by permanent implantation thereafter        Imaging / Labs / Studies (reviewed on 10/16/2017):      11/16/16 MRI Lumbar Spine Without Contrast    Narrative Technique:  Multiplanar, multisequence MR images were performed of the lumbar spine obtained without contrast.   Comparison: None.   Results:  Lumbar spine alignment is within normal limits. The vertebral body heights are well maintained, with no fracture.  No marrow signal abnormality suspicious for an infiltrative process.    The conus medullaris terminates at approximately the L1-L2 disk space.  The adjacent soft tissue structures show no significant abnormalities.  There is mild disc desiccation at the L3-L4 and L5-S1 discs.  Minimal disc space narrowing noted at these levels.  L1-L2: No significant central canal or neural foraminal narrowing.  L2-L3: No significant central  "canal or neural foraminal narrowing.  L3-L4: There is a broad-based right foraminal disc extrusion that results in moderate effacement of the exiting L3 nerve root.  No significant central canal narrowing.  L4-L5:  No significant central canal or neural foraminal narrowing.  L5-S1:  Broad-based right paracentral/right foraminal disc protrusion resulting in no significant central canal narrowing.  The right neural foraminal canal is mildly to moderately narrowed.  There is abutment of the exiting L5 nerve root.  No significant effacement of this nerve root.         5/13/16 X-Ray Lumbar Spine Complete 5 View    Narrative Five view lumbar spine.  Findings: Spinal alignment is anatomic.  Mild disc space narrowing and facet arthropathy at L5-S1.  Pedicles appear intact.  No compression fracture or subluxation.           Review of Systems:  CONSTITUTIONAL: patient denies any fever, chills, or weight loss  SKIN: patient denies any rash or itching  RESPIRATORY: patient denies having any shortness of breath  GASTROINTESTINAL: patient denies having any diarrhea, constipation, or bowel incontinence  GENITOURINARY: patient denies having any abnormal bladder function    MUSCULOSKELETAL:  - patient complains of the above noted pain/s (see chief pain complaint)    NEUROLOGICAL:   - pain as above  - strength in Lower extremities is decreased, BILATERALLY  - sensation in Lower extremities is abnormal, BILATERALLY  - patient denies any loss of bowel or bladder control      PSYCHIATRIC: patient denies any change in mood      Physical Exam:  Vitals:  BP (!) 145/87 (BP Location: Right arm, Patient Position: Sitting, BP Method: Large (Automatic))   Pulse 95   Resp 16   Ht 6' 4" (1.93 m)   Wt 102.1 kg (225 lb)   BMI 27.39 kg/m²    (reviewed on 10/16/2017)    General: alert and oriented, in no apparent distress  Gait: antalgic gait  Skin: No rashes, No discoloration, No obvious lesions  HEENT: EOMI  Respiratory: respirations " "nonlabored    Musculoskeletal:  - Any pain on flexion, extension, rotation:    >> pain on extension and rotation  - Straight Leg Raise:     >> LEFT :: negative    >> RIGHT :: negative  - Any tenderness to palpation across paraspinal muscles, joints, bursae:     >> across lumbar paraspinals    Neuro:  - Extremity Strength:     >> LEFT :: decreased throughout, worse with dorsiflexion/ plantar flexion    >> RIGHT :: 5/5     Psych:  Mood and affect is appropriate      Assessment:  Lumbar Radiculopathy  Lumbar Spondylosis   Lumbar DDD      Plan:  - Patient returns for follow-up. He has bilateral leg pain (R>L) across dermatomes, primarily extending along the posterior aspects of the legs. He has low back pain as well.  MRI shows DDD at L3/4 and L5/S1 with right side extension and encroachment of nerve roots, NF narrowing, and facet disease as well. There seems to be no indication for left side radicular pain based on the MRI. [place] [facility] [place] (records attached into "Media"). He ultimately had a spinal cord stimulator trial on 5-3-17 with [employer] dual 16 contact percutaneous leads.  He went on to have the SCS permanently implanted [facility].  - Refill Norco 5mg BID PRN pain and gabapentin 600mg TID x 2 months. He also uses compounded pain cream. We can consider switching to [employer] in the future if needed, depending on coverage as well.  - LA  reviewed and appropriate. Last filled 8-28-17.  - Will order UDS next visit to ensure medication compliance. Last UDS was negative, and he is unsure why this would be. I do not believe he is abusing his medication. He is overdue for refill today, so will not order testing.   RTC in 8 weeks. I discussed the risks, benefits, and alternatives to potential treatment options. All questions and concerns were fully addressed today in clinic. Dr. Puente was consulted regarding the patient plan and " agrees.           >>Opioid Risk Tool:  12/20/2016 :: 1    >>UDS:  12/20/2016 :: negative (at initial consult)  8/14/2017 :: negative     >> Pain Disability Index:  2/6/2017 :: 39  3/20/2017 :: 43  4/6/2017 :: 46  5/8/2017 :: 35  7/17/2017 :: 50  8/14/2017 :: 55  10/16/2017 :: 29

## 2017-10-17 RX ORDER — HYDROCODONE BITARTRATE AND ACETAMINOPHEN 5; 325 MG/1; MG/1
1 TABLET ORAL 2 TIMES DAILY PRN
Qty: 60 TABLET | Refills: 0 | Status: SHIPPED | OUTPATIENT
Start: 2017-10-17 | End: 2017-11-14 | Stop reason: SDUPTHER

## 2017-10-25 ENCOUNTER — APPOINTMENT (OUTPATIENT)
Dept: RADIOLOGY | Facility: HOSPITAL | Age: 47
End: 2017-10-25
Attending: ORTHOPAEDIC SURGERY
Payer: COMMERCIAL

## 2017-10-25 ENCOUNTER — OFFICE VISIT (OUTPATIENT)
Dept: ORTHOPEDICS | Facility: CLINIC | Age: 47
End: 2017-10-25
Payer: COMMERCIAL

## 2017-10-25 VITALS
SYSTOLIC BLOOD PRESSURE: 140 MMHG | BODY MASS INDEX: 26.79 KG/M2 | RESPIRATION RATE: 18 BRPM | HEART RATE: 74 BPM | WEIGHT: 220 LBS | DIASTOLIC BLOOD PRESSURE: 84 MMHG | HEIGHT: 76 IN

## 2017-10-25 DIAGNOSIS — M25.562 PAIN IN BOTH KNEES, UNSPECIFIED CHRONICITY: ICD-10-CM

## 2017-10-25 DIAGNOSIS — M25.561 PAIN IN BOTH KNEES, UNSPECIFIED CHRONICITY: Primary | ICD-10-CM

## 2017-10-25 DIAGNOSIS — M25.561 PAIN IN BOTH KNEES, UNSPECIFIED CHRONICITY: ICD-10-CM

## 2017-10-25 DIAGNOSIS — M17.12 PRIMARY OSTEOARTHRITIS OF LEFT KNEE: Primary | ICD-10-CM

## 2017-10-25 DIAGNOSIS — M25.562 PAIN IN BOTH KNEES, UNSPECIFIED CHRONICITY: Primary | ICD-10-CM

## 2017-10-25 PROCEDURE — 99214 OFFICE O/P EST MOD 30 MIN: CPT | Mod: S$GLB,,, | Performed by: ORTHOPAEDIC SURGERY

## 2017-10-25 PROCEDURE — 99999 PR PBB SHADOW E&M-EST. PATIENT-LVL III: CPT | Mod: PBBFAC,,, | Performed by: ORTHOPAEDIC SURGERY

## 2017-10-25 PROCEDURE — 73562 X-RAY EXAM OF KNEE 3: CPT | Mod: TC,50,PO

## 2017-10-25 PROCEDURE — 73562 X-RAY EXAM OF KNEE 3: CPT | Mod: 26,50,, | Performed by: RADIOLOGY

## 2017-10-25 RX ORDER — LISINOPRIL 20 MG/1
40 TABLET ORAL
COMMUNITY
End: 2018-02-21 | Stop reason: SDUPTHER

## 2017-10-25 RX ORDER — TRAMADOL HYDROCHLORIDE 50 MG/1
50 TABLET ORAL
COMMUNITY
End: 2017-12-12

## 2017-10-25 RX ORDER — AMLODIPINE BESYLATE 10 MG/1
10 TABLET ORAL
COMMUNITY

## 2017-10-30 NOTE — PROGRESS NOTES
Subjective:     Patient ID: Cornel Dick is a 47 y.o. male.    Chief Complaint: Pain and Swelling of the Left Knee    HPI: The patient presents with ongoing increasing severe pain in his left knee.  He rates his discomfort at a study 6-1/2-7 on a pain scale of 1-10.  He hurts him when standing or walking.  He is status post arthroscopy of his knee in 2012.  He played basketball and football in school.    History reviewed. No pertinent family history.  Past Medical History:   Diagnosis Date    Arthritis     HBP (high blood pressure)      Social History     Social History    Marital status:      Spouse name: N/A    Number of children: N/A    Years of education: N/A     Social History Main Topics    Smoking status: Never Smoker    Smokeless tobacco: Never Used    Alcohol use No    Drug use: No    Sexual activity: Not Asked     Other Topics Concern    None     Social History Narrative    None     Past Surgical History:   Procedure Laterality Date    KNEE ARTHROSCOPY Left      Review of patient's allergies indicates:   Allergen Reactions    Chocolate flavor Swelling         Medication List with Changes/Refills   Current Medications    ACETAMINOPHEN (ARTHRITIS PAIN RELIEVER ORAL)    Take by mouth.    AMLODIPINE (NORVASC) 10 MG TABLET    Take by mouth every evening.     AMLODIPINE (NORVASC) 10 MG TABLET    Take 10 mg by mouth.    BAICALIN-CATECHIN (LIMBREL) 500 MG CAP    Take 500 mg by mouth 2 (two) times daily.    CELECOXIB (CELEBREX) 200 MG CAPSULE    Take 1 capsule (200 mg total) by mouth 2 (two) times daily as needed for Pain.    EFINACONAZOLE (JUBLIA) 10 % ANITHA    Apply 1 application topically once daily.    GABAPENTIN (NEURONTIN) 600 MG TABLET    Take 1 tablet (600 mg total) by mouth 3 (three) times daily. may cause drowsiness    HYDROCODONE-ACETAMINOPHEN 5-325MG (NORCO) 5-325 MG PER TABLET    Take 1 tablet by mouth 2 (two) times daily as needed for Pain.    LISINOPRIL  "(PRINIVIL,ZESTRIL) 20 MG TABLET    Take 40 mg by mouth.    LISINOPRIL-HYDROCHLOROTHIAZIDE (PRINZIDE,ZESTORETIC) 20-12.5 MG PER TABLET        MV-MN/FA/LYCOPENE/LUT/HB#178 (HUBERT MULTIVITAMIN FOR MEN ORAL)    Take by mouth.    PRAVASTATIN (PRAVACHOL) 40 MG TABLET    Take 40 mg by mouth every evening.     TIZANIDINE (ZANAFLEX) 4 MG TABLET    TAKE ONE TABLET BY MOUTH AT BEDTIME AS NEEDED    TRAMADOL (ULTRAM) 50 MG TABLET    Take 50 mg by mouth.       Review of Systems   Musculoskeletal: Positive for joint pain.   Gastrointestinal: Negative.  Negative for constipation, diarrhea, nausea and vomiting.       Objective:   BP (!) 140/84   Pulse 74   Resp 18   Ht 6' 4" (1.93 m)   Wt 99.8 kg (220 lb)   BMI 26.78 kg/m²       Ortho/SPM Exam    Patient has painful crepitus in the left knee and patellofemoral joint.  There is a mild varus deformity noted.  The patient walks with an antalgic gait.  He uses a cane in his left hand.  X-RAY:   Comparison: 01/18/2017    Bilateral standing ap knees  bilateral sunrise views  right lateral  left lateral     Findings:    Juxta-articular osteopenia.  Bilateral tricompartment degenerative changes, greater on the LEFT.  There is increased narrowing of the medial compartments bilaterally, greater on the LEFT with appearance of bone on bone articulation.  No fracture, dislocation or RIGHT effusion.  Small LEFT effusion suggested within the suprapatellar bursa.    Assessment:         Encounter Diagnosis   Name Primary?    Primary osteoarthritis of left knee Yes          Plan:     Left total knee replacement on January 16 2018.  The patient understands the material risks of surgery, was involved, and desires to proceed.  He understands that total knee replacement is for the purpose of making walking comfortable.  It is not for any impact or sports-like activity, which may cause premature loosening and subsequent pain.             Disclaimer: This note is generated with speech recognition " software and is subject to transcription error and sound alike phrases that may be missed by proofreading.

## 2017-11-09 DIAGNOSIS — M17.12 PRIMARY OSTEOARTHRITIS OF LEFT KNEE: Primary | ICD-10-CM

## 2017-11-09 DIAGNOSIS — Z01.818 PREOP TESTING: Primary | ICD-10-CM

## 2017-11-14 RX ORDER — HYDROCODONE BITARTRATE AND ACETAMINOPHEN 5; 325 MG/1; MG/1
1 TABLET ORAL 2 TIMES DAILY PRN
Qty: 60 TABLET | Refills: 0 | Status: SHIPPED | OUTPATIENT
Start: 2017-11-14 | End: 2017-12-12 | Stop reason: SDUPTHER

## 2017-12-12 ENCOUNTER — OFFICE VISIT (OUTPATIENT)
Dept: PAIN MEDICINE | Facility: CLINIC | Age: 47
End: 2017-12-12
Payer: COMMERCIAL

## 2017-12-12 VITALS
HEIGHT: 76 IN | HEART RATE: 103 BPM | DIASTOLIC BLOOD PRESSURE: 83 MMHG | WEIGHT: 220 LBS | RESPIRATION RATE: 18 BRPM | SYSTOLIC BLOOD PRESSURE: 148 MMHG | BODY MASS INDEX: 26.79 KG/M2

## 2017-12-12 DIAGNOSIS — M47.817 LUMBOSACRAL SPONDYLOSIS WITHOUT MYELOPATHY: ICD-10-CM

## 2017-12-12 DIAGNOSIS — M54.16 LUMBAR RADICULOPATHY: ICD-10-CM

## 2017-12-12 DIAGNOSIS — M51.36 DDD (DEGENERATIVE DISC DISEASE), LUMBAR: Primary | ICD-10-CM

## 2017-12-12 PROCEDURE — 99999 PR PBB SHADOW E&M-EST. PATIENT-LVL IV: CPT | Mod: PBBFAC,,, | Performed by: ANESTHESIOLOGY

## 2017-12-12 PROCEDURE — 99214 OFFICE O/P EST MOD 30 MIN: CPT | Mod: S$GLB,,, | Performed by: ANESTHESIOLOGY

## 2017-12-12 RX ORDER — HYDROCODONE BITARTRATE AND ACETAMINOPHEN 5; 325 MG/1; MG/1
1 TABLET ORAL 2 TIMES DAILY PRN
Qty: 60 TABLET | Refills: 0 | Status: SHIPPED | OUTPATIENT
Start: 2018-01-11 | End: 2018-02-06 | Stop reason: SDUPTHER

## 2017-12-12 RX ORDER — GABAPENTIN 800 MG/1
800 TABLET ORAL 2 TIMES DAILY
Qty: 60 TABLET | Refills: 1 | Status: SHIPPED | OUTPATIENT
Start: 2017-12-12 | End: 2017-12-15 | Stop reason: SDUPTHER

## 2017-12-12 RX ORDER — HYDROCODONE BITARTRATE AND ACETAMINOPHEN 5; 325 MG/1; MG/1
1 TABLET ORAL 2 TIMES DAILY PRN
Qty: 60 TABLET | Refills: 0 | Status: SHIPPED | OUTPATIENT
Start: 2017-12-12 | End: 2017-12-12 | Stop reason: SDUPTHER

## 2017-12-12 NOTE — PROGRESS NOTES
Chief Pain Complaint:  Low Back Pain, Bilateral Leg Pain (Right > L)     History of Present Illness:  This patient is a 47 y.o. male who presents today complaining of the above noted pain/s. The patient describes the pain as follows.    - duration of pain: > 3 years   - timing: intermittent   - character: aching, shooting  - radiating, dermatomal: extends into bilateral lower extremities across dermatomes, more posteriorly, Right > Left  - antecedent trauma, prior spinal surgery: no prior trauma, no prior spinal surgery   - pertinent negatives: No fever, No chills, No weight loss, No bladder dysfunction, No bowel dysfunction, No saddle anesthesia  - pertinent positives: generalized nonspecific Lower Extremity weakness bilaterally    - medications, other therapies tried (physical therapy, injections):     >> gabapentin, zanaflex, Tramadol, norco    >> Has previously undergone Physical Therapy    >> Has previously undergone spinal injection/s:    - Right L3/L4, L5/S1 TF KAVITA on 1-13-17 and 3-10-17 with only weeks of relief after these injections   - [employer] SCS trial on 5-3-17 followed by permanent implantation at [facility] thereafter        Imaging / Labs / Studies (reviewed on 12/12/2017):      11/16/16 MRI Lumbar Spine Without Contrast    Narrative Technique:  Multiplanar, multisequence MR images were performed of the lumbar spine obtained without contrast.   Comparison: None.   Results:  Lumbar spine alignment is within normal limits. The vertebral body heights are well maintained, with no fracture.  No marrow signal abnormality suspicious for an infiltrative process.    The conus medullaris terminates at approximately the L1-L2 disk space.  The adjacent soft tissue structures show no significant abnormalities.  There is mild disc desiccation at the L3-L4 and L5-S1 discs.  Minimal disc space narrowing noted at these levels.  L1-L2: No significant central canal or neural foraminal narrowing.  L2-L3: No significant  "central canal or neural foraminal narrowing.  L3-L4: There is a broad-based right foraminal disc extrusion that results in moderate effacement of the exiting L3 nerve root.  No significant central canal narrowing.  L4-L5:  No significant central canal or neural foraminal narrowing.  L5-S1:  Broad-based right paracentral/right foraminal disc protrusion resulting in no significant central canal narrowing.  The right neural foraminal canal is mildly to moderately narrowed.  There is abutment of the exiting L5 nerve root.  No significant effacement of this nerve root.         5/13/16 X-Ray Lumbar Spine Complete 5 View    Narrative Five view lumbar spine.  Findings: Spinal alignment is anatomic.  Mild disc space narrowing and facet arthropathy at L5-S1.  Pedicles appear intact.  No compression fracture or subluxation.           Review of Systems:  CONSTITUTIONAL: patient denies any fever, chills, or weight loss  SKIN: patient denies any rash or itching  RESPIRATORY: patient denies having any shortness of breath  GASTROINTESTINAL: patient denies having any diarrhea, constipation, or bowel incontinence  GENITOURINARY: patient denies having any abnormal bladder function    MUSCULOSKELETAL:  - patient complains of the above noted pain/s (see chief pain complaint)    NEUROLOGICAL:   - pain as above  - strength in Lower extremities is decreased, BILATERALLY  - sensation in Lower extremities is abnormal, BILATERALLY  - patient denies any loss of bowel or bladder control      PSYCHIATRIC: patient denies any change in mood      Physical Exam:  Vitals:  BP (!) 148/83 (BP Location: Right arm, Patient Position: Sitting, BP Method: Medium (Automatic))   Pulse 103   Resp 18   Ht 6' 4" (1.93 m)   Wt 99.8 kg (220 lb)   BMI 26.78 kg/m²    (reviewed on 12/12/2017)    General: alert and oriented, in no apparent distress  Gait: antalgic gait  Skin: No rashes, No discoloration, No obvious lesions  HEENT: EOMI  Respiratory: respirations " "nonlabored    Musculoskeletal:  - Any pain on flexion, extension, rotation:    >> pain on extension and rotation    - Any tenderness to palpation across paraspinal muscles, joints, bursae:     >> across lumbar paraspinals    Neuro:  - Extremity Strength:     >> LEFT :: decreased throughout, worse with dorsiflexion/ plantar flexion    >> RIGHT :: 5/5     Psych:  Mood and affect is appropriate      Assessment:  Lumbar Radiculopathy  Lumbar Spondylosis   Lumbar DDD      Plan:  - Patient returns for follow-up. He has bilateral leg pain (R>L) across dermatomes, primarily extending along the posterior aspects of the legs. He has low back pain as well.  MRI shows DDD at L3/4 and L5/S1 with right side extension and encroachment of nerve roots, NF narrowing, and facet disease as well. There seems to be no indication for left side radicular pain based on the MRI. He has been seen by Neurosurgery at the [facility], who recommended spinal injections (records attached into "Media"). He ultimately had a spinal cord stimulator trial on 5-3-17 with [employer] dual 16 contact percutaneous leads.  He went on to have the SCS [facility].  - Refill Norco 5mg BID PRN pain and gabapentin x 2 months. He also uses compounded pain cream.  - LA  reviewed and appropriate.  - RTC in 8 weeks.   - I discussed the risks, benefits, and alternatives to potential treatment options. All questions and concerns were fully addressed today in clinic.       >>Opioid Risk Tool:  12/20/2016 :: 1    >>UDS:  12/20/2016 :: negative (at initial consult)  8/14/2017 :: negative     >> Pain Disability Index:  2/6/2017 :: 39  3/20/2017 :: 43  4/6/2017 :: 46  5/8/2017 :: 35  7/17/2017 :: 50  8/14/2017 :: 55  10/16/2017 :: 29  "

## 2017-12-13 ENCOUNTER — PATIENT MESSAGE (OUTPATIENT)
Dept: PAIN MEDICINE | Facility: CLINIC | Age: 47
End: 2017-12-13

## 2017-12-15 RX ORDER — GABAPENTIN 800 MG/1
800 TABLET ORAL 3 TIMES DAILY
Qty: 90 TABLET | Refills: 2 | Status: SHIPPED | OUTPATIENT
Start: 2017-12-15 | End: 2018-02-06 | Stop reason: SDUPTHER

## 2017-12-27 ENCOUNTER — PATIENT MESSAGE (OUTPATIENT)
Dept: ORTHOPEDICS | Facility: CLINIC | Age: 47
End: 2017-12-27

## 2017-12-27 DIAGNOSIS — M17.12 PRIMARY OSTEOARTHRITIS OF LEFT KNEE: Primary | ICD-10-CM

## 2017-12-28 ENCOUNTER — OFFICE VISIT (OUTPATIENT)
Dept: ORTHOPEDICS | Facility: CLINIC | Age: 47
End: 2017-12-28
Payer: COMMERCIAL

## 2017-12-28 ENCOUNTER — HOSPITAL ENCOUNTER (OUTPATIENT)
Dept: RADIOLOGY | Facility: HOSPITAL | Age: 47
Discharge: HOME | End: 2017-12-28
Attending: ORTHOPAEDIC SURGERY
Payer: COMMERCIAL

## 2017-12-28 VITALS
DIASTOLIC BLOOD PRESSURE: 78 MMHG | SYSTOLIC BLOOD PRESSURE: 145 MMHG | HEART RATE: 85 BPM | BODY MASS INDEX: 26.79 KG/M2 | RESPIRATION RATE: 19 BRPM | WEIGHT: 220 LBS | HEIGHT: 76 IN

## 2017-12-28 DIAGNOSIS — M25.551 BILATERAL HIP PAIN: ICD-10-CM

## 2017-12-28 DIAGNOSIS — M25.651 HIP JOINT STIFFNESS, BILATERAL: ICD-10-CM

## 2017-12-28 DIAGNOSIS — M17.12 PRIMARY OSTEOARTHRITIS OF LEFT KNEE: ICD-10-CM

## 2017-12-28 DIAGNOSIS — M17.12 PRIMARY OSTEOARTHRITIS OF LEFT KNEE: Primary | ICD-10-CM

## 2017-12-28 DIAGNOSIS — M25.652 HIP JOINT STIFFNESS, BILATERAL: ICD-10-CM

## 2017-12-28 DIAGNOSIS — M25.552 BILATERAL HIP PAIN: ICD-10-CM

## 2017-12-28 PROCEDURE — 73565 X-RAY EXAM OF KNEES: CPT | Mod: 26,,, | Performed by: RADIOLOGY

## 2017-12-28 PROCEDURE — 73565 X-RAY EXAM OF KNEES: CPT | Mod: TC

## 2017-12-28 PROCEDURE — 99213 OFFICE O/P EST LOW 20 MIN: CPT | Mod: S$GLB,,, | Performed by: ORTHOPAEDIC SURGERY

## 2017-12-28 PROCEDURE — 99999 PR PBB SHADOW E&M-EST. PATIENT-LVL IV: CPT | Mod: PBBFAC,,, | Performed by: ORTHOPAEDIC SURGERY

## 2017-12-28 RX ORDER — HYDRALAZINE HYDROCHLORIDE 100 MG/1
TABLET, FILM COATED ORAL
COMMUNITY
Start: 2017-12-18

## 2017-12-28 RX ORDER — ABACAVIR SULFATE, DOLUTEGRAVIR SODIUM, LAMIVUDINE 600; 50; 300 MG/1; MG/1; MG/1
TABLET, FILM COATED ORAL
COMMUNITY
Start: 2017-12-18

## 2017-12-28 RX ORDER — GABAPENTIN 600 MG/1
TABLET ORAL
COMMUNITY
Start: 2017-12-14 | End: 2017-12-28

## 2017-12-28 RX ORDER — OXAPROZIN 600 MG/1
TABLET, FILM COATED ORAL
COMMUNITY
Start: 2017-11-05

## 2017-12-28 NOTE — LETTER
December 28, 2017      Cristiano Ibarra MD  [address] [place] [address] [address] [id number]           [facility]  [address] [place] [address] [address] [place] [address]  Phone: [phone]  Fax: [phone]          Patient: Cornel Dick   MR Number: [id number]   YOB: 1970   Date of Visit: 12/28/2017       Dear Dr. Cristiano Ibarra:    Thank you for referring Cornel Dick to me for evaluation. Attached you will find relevant portions of my assessment and plan of care.    If you have questions, please do not hesitate to call me. I look forward to following Cornel Dick along with you.    Sincerely,    Ford Alcantara MD    Enclosure  CC:  No Recipients    If you would like to receive this communication electronically, please contact [email or web address] or [phone] to request more information on [employer] access.    For providers and/or their staff who would like to refer a patient to [facility], please contact us through our one-stop-shop provider referral line, [facility] , at [phone].    If you feel you have received this communication in error or would no longer like to receive these types of communications, please e-mail [email or web address]

## 2017-12-28 NOTE — PROGRESS NOTES
"Subjective:     Patient ID: Cornel Dick is a 47 y.o. male.    Chief Complaint: Pain of the Left Knee    History of reevaluation of left knee pain.  He had surgery scheduled with Dr. Ibarra in January, but scheduling conflict came up and I was asked if I could see him today.  He has had many years of left knee pain.  This pain is worsening with time.  He has tried extensive nonoperative treatment including injections including corticosteroid and hyaluronic acid injections, but these only gave him 1-2 weeks of relief.  He is also been using a medial  brace but this is been since he doesn't 13 and he feels the brace may be losing its effectiveness getting "worn out."  Note he also uses an ankle brace on the left side due to ankle pain.  He has tried physical therapy for at least 6 months in the past.  He takes Celebrex daily.  Despite all of these treatments, he continues to have medial sided knee pain also anterior knee pain.  This interferes with his daily activities.  Worse with standing and prolonged ambulation.  He uses a cane.  He did have arthroscopy of this left knee in 2012 with Dr. Ibarra.  He believes he had an injury playing sports when he was 18, he said he had surgery for this and that he "broke the bone in 3 places."  But he is not able to get much more information than this.      Knee Pain    The pain is present in the left knee. The pain radiates to the left foot and groin. This is a chronic problem. There has been a history of trauma. The quality of the pain is described as aching, sharp and shooting. The pain is at a severity of 7/10. Associated symptoms include an inability to bear weight, joint locking, joint swelling, a limited range of motion, numbness and tingling. Pertinent negatives include no fever or itching. The symptoms are aggravated by activity and bearing weight. He has tried injection treatment and oral narcotics for the symptoms. The treatment provided mild relief. " Physical therapy was effective.      Past Medical History:   Diagnosis Date    Arthritis     HBP (high blood pressure)      Past Surgical History:   Procedure Laterality Date    KNEE ARTHROSCOPY Left      History reviewed. No pertinent family history.  Social History     Social History    Marital status:      Spouse name: N/A    Number of children: N/A    Years of education: N/A     Occupational History    Not on file.     Social History Main Topics    Smoking status: Never Smoker    Smokeless tobacco: Never Used    Alcohol use No    Drug use: No    Sexual activity: Not on file     Other Topics Concern    Not on file     Social History Narrative    No narrative on file     Medication List with Changes/Refills   Current Medications    ACETAMINOPHEN (ARTHRITIS PAIN RELIEVER ORAL)    Take by mouth.    AMLODIPINE (NORVASC) 10 MG TABLET    Take by mouth every evening.     AMLODIPINE (NORVASC) 10 MG TABLET    Take 10 mg by mouth.    BAICALIN-CATECHIN (LIMBREL) 500 MG CAP    Take 500 mg by mouth 2 (two) times daily.    CELECOXIB (CELEBREX) 200 MG CAPSULE    Take 1 capsule (200 mg total) by mouth 2 (two) times daily as needed for Pain.    EFINACONAZOLE (JUBLIA) 10 % ANITHA    Apply 1 application topically once daily.    GABAPENTIN (NEURONTIN) 800 MG TABLET    Take 1 tablet (800 mg total) by mouth 3 (three) times daily.    HYDRALAZINE (APRESOLINE) 100 MG TABLET        HYDROCODONE-ACETAMINOPHEN 5-325MG (NORCO) 5-325 MG PER TABLET    Take 1 tablet by mouth 2 (two) times daily as needed for Pain.    LISINOPRIL (PRINIVIL,ZESTRIL) 20 MG TABLET    Take 40 mg by mouth.    LISINOPRIL-HYDROCHLOROTHIAZIDE (PRINZIDE,ZESTORETIC) 20-12.5 MG PER TABLET        MV-MN/FA/LYCOPENE/LUT/HB#178 (HUBERT MULTIVITAMIN FOR MEN ORAL)    Take by mouth.    OXAPROZIN (DAYPRO) 600 MG TABLET        PRAVASTATIN (PRAVACHOL) 40 MG TABLET    Take 40 mg by mouth every evening.     TIZANIDINE (ZANAFLEX) 4 MG TABLET    TAKE ONE TABLET BY MOUTH  AT BEDTIME AS NEEDED    TRIUMEQ 600- MG TAB       Discontinued Medications    GABAPENTIN (NEURONTIN) 600 MG TABLET         Review of patient's allergies indicates:   Allergen Reactions    Chocolate flavor Swelling     Review of Systems   Constitution: Negative for fever.   HENT: Negative for sore throat.    Eyes: Negative for blurred vision.   Cardiovascular: Negative for dyspnea on exertion.   Respiratory: Negative for shortness of breath.    Hematologic/Lymphatic: Does not bruise/bleed easily.   Skin: Negative for itching.   Gastrointestinal: Negative for vomiting.   Genitourinary: Negative for dysuria.   Neurological: Positive for numbness and tingling. Negative for dizziness.   Psychiatric/Behavioral: The patient does not have insomnia.        Objective:   Body mass index is 26.78 kg/m².  Vitals:    12/28/17 1302   BP: (!) 145/78   Pulse: 85   Resp: 19       General: Cornel is well-developed, well-nourished, appears stated age, in no acute distress, alert and oriented to time, place and person.       General    Nursing note and vitals reviewed.  Constitutional: He is oriented to person, place, and time. He appears well-developed. No distress.   HENT:   Head: Normocephalic and atraumatic.   Eyes: EOM are normal.   Neck: Neck supple.   Cardiovascular: Normal rate.    Pulmonary/Chest: Effort normal.   Neurological: He is alert and oriented to person, place, and time.   Psychiatric: He has a normal mood and affect. His behavior is normal.           Right Knee Exam     Comments:  Summation the right knee demonstrates no noticeable effusion.  Range of motions from 0-120° with no pain.  Hamstrings are well stretched.  Quadriceps tone is good.  Hip range of motion has very limited motion with 10° of external and 5° of internal at 90°.    Left Knee Exam     Comments:  Examination with standing demonstrates mild varus deformity of the left leg.  With ambulation he has no varus thrust.  He has a antalgic gait.   "Diminished the left knee demonstrates moderate effusion.  He does have palpation over the medial joint line middle one third and posterior one third markedly.  Mildly tender to palpation over lateral joint line.  Tender to palpation over the border of the patella.  He does have full extension but can only flex to 90° with medial sided pain.  Crepitus noted to the patellofemoral joint with active and passive range of motion.  He is stable anterior drawer, stable locking, stable posterior drawer, stable varus valgus stress at 0 and 30°.  His varus does seem correctable at 30° with manual correction.  Mariana's was not tested today.  Distally he has sensation light touch intact to 2+ Lorcet cells.  Pulse intact plantar flexion dorsiflexion tib ant and flexor hallucis longus.  His hip is very stiff with perhaps 5° of internal rotation and 10° of external rotation.  I can flex to 90 but again a great deal of hip stiffness.  His hamstrings are tight at 10°.  Quadriceps tone is mildly decreased.      IMAGING PA standing flex view of bilateral knees today demonstrates end-stage, severe medial compartment osteoarthritis with "bone-on-bone" loss of joint space the medial compartment, subchondral sclerosis and osteophyte formation.  There is osteophyte formation with mild to moderate loss of patellofemoral joint space on the left.  Lateral joint space looks relatively well preserved.  Medial joint space on the right knee is mildly to moderately decreased but nowhere as severe as on the left.  Patellofemoral joint looks good on the right knee.    Assessment:     Encounter Diagnoses   Name Primary?    Primary osteoarthritis of left knee Yes    Hip joint stiffness, bilateral         Plan:     Trial new medial  brace  Continue celebrex  PT to work on ROM  RTC 6 weeks    I had a long discussion with Cornel today regarding his knee.  He has tried extensive nonoperative treatment without much success.  His knee is very " painful for him and bothersome on a daily basis.  Note he is disabled because of his lower back.  He also has some ipsilateral ankle pain.  Note that I did look at his prior ankle x-rays from 2015 or 2016 and these show good radiographic joint space.  Nonetheless, his left knee is very bothersome for him at this point, his pain is all medial anterior and this corresponds to his radiographic degenerative joint disease.    Because of his young age and his very limited motion, I do think that another trial of therapy to work on range of motion, especially flexion, would be beneficial for him.  His medial  brace is very worn at this point, and I discussed with him that a new brace may be more effective for him.  I think is reasonable to try before committing to knee arthroplasty surgery.  He is already on Celebrex, so we will continue this.    He will follow up in 6 weeks to report on his progress. We did discuss that a TKA may still be the best treatment for him, but because of his young age, the goal will be to push this back as much as possible to decrease risk of early revision surgery. He is very much in agreement with this plan.    Ford Alcantara MD    Orthopaedic Surgery  [facility]

## 2018-02-06 ENCOUNTER — OFFICE VISIT (OUTPATIENT)
Dept: PAIN MEDICINE | Facility: CLINIC | Age: 48
End: 2018-02-06
Payer: COMMERCIAL

## 2018-02-06 VITALS
HEIGHT: 76 IN | HEART RATE: 99 BPM | BODY MASS INDEX: 26.79 KG/M2 | DIASTOLIC BLOOD PRESSURE: 91 MMHG | SYSTOLIC BLOOD PRESSURE: 147 MMHG | RESPIRATION RATE: 18 BRPM | WEIGHT: 220 LBS

## 2018-02-06 DIAGNOSIS — M47.816 LUMBAR SPONDYLOSIS: ICD-10-CM

## 2018-02-06 DIAGNOSIS — M47.22 OSTEOARTHRITIS OF SPINE WITH RADICULOPATHY, CERVICAL REGION: Primary | ICD-10-CM

## 2018-02-06 DIAGNOSIS — M54.16 LUMBAR RADICULOPATHY: ICD-10-CM

## 2018-02-06 DIAGNOSIS — M54.2 NECK PAIN: ICD-10-CM

## 2018-02-06 PROCEDURE — 3008F BODY MASS INDEX DOCD: CPT | Mod: S$GLB,,, | Performed by: ANESTHESIOLOGY

## 2018-02-06 PROCEDURE — 99999 PR PBB SHADOW E&M-EST. PATIENT-LVL III: CPT | Mod: PBBFAC,,, | Performed by: ANESTHESIOLOGY

## 2018-02-06 PROCEDURE — 99214 OFFICE O/P EST MOD 30 MIN: CPT | Mod: S$GLB,,, | Performed by: ANESTHESIOLOGY

## 2018-02-06 RX ORDER — GABAPENTIN 800 MG/1
800 TABLET ORAL 3 TIMES DAILY
Qty: 90 TABLET | Refills: 2 | Status: SHIPPED | OUTPATIENT
Start: 2018-02-06 | End: 2018-06-29 | Stop reason: SDUPTHER

## 2018-02-06 RX ORDER — TIZANIDINE 4 MG/1
4 TABLET ORAL 2 TIMES DAILY PRN
Qty: 60 TABLET | Refills: 0 | Status: SHIPPED | OUTPATIENT
Start: 2018-02-06 | End: 2018-03-12 | Stop reason: SDUPTHER

## 2018-02-06 RX ORDER — HYDROCODONE BITARTRATE AND ACETAMINOPHEN 5; 325 MG/1; MG/1
1 TABLET ORAL EVERY 12 HOURS PRN
Qty: 60 TABLET | Refills: 0 | Status: SHIPPED | OUTPATIENT
Start: 2018-02-06 | End: 2018-03-08 | Stop reason: SDUPTHER

## 2018-02-06 NOTE — PROGRESS NOTES
Chief Pain Complaint:  Low-back Pain        History of Present Illness:   Cornel Dick is a 47 y.o. male  who is presenting with a chief complaint of Low-back Pain  . The patient began experiencing this problem insidiously, and the pain has been gradually worsening over the past 5 week(s). The pain is described as throbbing, shooting, burning and electrical and is located in the right cervial spine. Pain is intermittent and lasts hours. The pain radiates to right arm. The patient rates his pain a 8 out of ten and interferes with activities of daily living a 8 out of ten. Pain is exacerbated by flexion of the cervial spine, and is improved by rest. Patient reports no prior trauma, prior lumbar surgery     History of Present Illness:  This patient is a 47 y.o. male who presents today complaining of the above noted pain/s. The patient describes the pain as follows.    - duration of pain: > 3 years   - timing: intermittent   - character: aching, shooting  - radiating, dermatomal: extends into bilateral lower extremities across dermatomes, more posteriorly, Right > Left  - antecedent trauma, prior spinal surgery: no prior trauma, no prior spinal surgery   - pertinent negatives: No fever, No chills, No weight loss, No bladder dysfunction, No bowel dysfunction, No saddle anesthesia  - pertinent positives: generalized nonspecific Lower Extremity weakness bilaterally    - medications, other therapies tried (physical therapy, injections):     >> gabapentin, zanaflex, Tramadol, norco    >> Has previously undergone Physical Therapy    >> Has previously undergone spinal injection/s:    - Right L3/L4, L5/S1 TF KAVITA on 1-13-17 and 3-10-17 with only weeks of relief after these injections   - [place] [employer] SCS trial on 5-3-17 followed by permanent implantation at [facility] [place] thereafter      Imaging / Labs / Studies (reviewed on 2/6/2018):    Results for orders placed during the hospital encounter of 11/16/16   MRI Lumbar Spine Without  Contrast    Narrative Technique:  Multiplanar, multisequence MR images were performed of the lumbar spine obtained without contrast.     Comparison: None.     Results:    Lumbar spine alignment is within normal limits. The vertebral body heights are well maintained, with no fracture.  No marrow signal abnormality suspicious for an infiltrative process.      The conus medullaris terminates at approximately the L1-L2 disk space.  The adjacent soft tissue structures show no significant abnormalities.  There is mild disc desiccation at the L3-L4 and L5-S1 discs.  Minimal disc space narrowing noted at these levels.    L1-L2: No significant central canal or neural foraminal narrowing.    L2-L3: No significant central canal or neural foraminal narrowing.    L3-L4: There is a broad-based right foraminal disc extrusion that results in moderate effacement of the exiting L3 nerve root.  No significant central canal narrowing.    L4-L5:  No significant central canal or neural foraminal narrowing.    L5-S1:  Broad-based right paracentral/right foraminal disc protrusion resulting in no significant central canal narrowing.  The right neural foraminal canal is mildly to moderately narrowed.  There is abutment of the exiting L5 nerve root.  No significant effacement of this nerve root.    Impression      1.  Right foraminal disc extrusion at the L3-L4 level which is the likely cause of patient's symptoms.    2.  Smaller broad-based disc protrusion at the L5-S1 level.         Electronically signed by: MARIO GIBSON D.O.  Date:     11/16/16  Time:    10:38        Results for orders placed during the hospital encounter of 05/13/16   X-Ray Lumbar Spine Complete 5 View    Narrative Five view lumbar spine.    Findings: Spinal alignment is anatomic.  Mild disc space narrowing and facet arthropathy at L5-S1.  Pedicles appear intact.  No compression fracture or subluxation.    Impression  As above.  ______________________________________  "    Electronically signed by: VASYL HINES MD  Date:     05/13/16  Time:    11:17            Review of Systems:  CONSTITUTIONAL: patient denies any fever, chills, or weight loss  SKIN: patient denies any rash or itching  RESPIRATORY: patient denies having any shortness of breath  GASTROINTESTINAL: patient denies having any diarrhea, constipation, or bowel incontinence  GENITOURINARY: patient denies having any abnormal bladder function    MUSCULOSKELETAL:  - patient complains of the above noted pain/s (see chief pain complaint)    NEUROLOGICAL:   - pain as above  - strength in Upper extremities is intact, BILATERALLY  - sensation in Upper extremities is intact, BILATERALLY  - patient denies any loss of bowel or bladder control      PSYCHIATRIC: patient denies any change in mood    Other:  All other systems reviewed and are negative      Physical Exam:  BP (!) 147/91 (BP Location: Right arm, Patient Position: Sitting, BP Method: Medium (Automatic))   Pulse 99   Resp 18   Ht 6' 4" (1.93 m)   Wt 99.8 kg (220 lb)   BMI 26.78 kg/m²  (reviewed on 2/6/2018)  General: Alert and oriented, in no apparent distress.  Gait: normal gait.  Skin: No rashes, No discoloration, No obvious lesions  HEENT: Normocephalic, atraumatic. Pupils equal and round.  Cardiovascular: Regular rate and rhythm , no significant peripheral edema present  Respiratory: Without audible wheezing, without use of accessory muscles of respiration.    Musculoskeletal:    Cervical Spine    - Pain on flexion of cervical spine Present  - Spurling's Test:  Present on right     - Pain on extension of cervical spine Absent  - TTP over the cervical facet joints Absent  - Cervical facet loading Absent      Lumbar Spine    - Pain on flexion of lumbar spine Absent  - Straight Leg Raise:  Absent    - Pain on extension of lumbar spine Absent  - TTP over the lumbar facet joints Absent  - Lumbar facet loading Absent    -Pain on palpation over the SI joint  Absent  - " "MAEVE: Absent      Neuro:    Strength:  UE R/L: D: 5/5; B: 5/5; T: 5/5; WF: 5/5; WE: 5/5; IO: 5/5;  LE R/L: HF: 5/5, HE: 5/5, KF: 5/5; KE: 5/5; FE: 5/5; FF: 5/5    Extremity Reflexes: Brisk and symmetric throughout.      Extremity Sensory: Sensation to pinprick and temperature symmetric. Proprioception intact.      Psych:  Mood and affect is appropriate        Assessment:    Cornel Dick is a 47 y.o. year old male who is presenting with     Encounter Diagnoses   Name Primary?    Osteoarthritis of spine with radiculopathy, cervical region Yes    Lumbar spondylosis     Lumbar radiculopathy     Neck pain      Patient returns for follow-up. He has bilateral leg pain (R>L) across dermatomes, primarily extending along the posterior aspects of the legs. He has low back pain as well.  MRI shows DDD at L3/4 and L5/S1 with right side extension and encroachment of nerve roots, NF narrowing, and facet disease as well. There seems to be no indication for left side radicular pain based on the MRI. He has been seen by Neurosurgery at the [facility] [place], who recommended spinal injections (records attached into "Media"). He ultimately had a spinal cord stimulator trial on 5-3-17 with Adeze dual 16 contact percutaneous leads.  He went on to have the SCS permanently implanted [facility]. This has provided a lot of relief of his lower extremity pain.      Plan:    1. Interventional: Consider Right C6-7 TFESI if conservative measures fail.     2. Pharmacologic: Continue Norco 5/325 mg Po BID PRN (60 tabs).  checked. Opioid contract signed. Patient tolerating opioids with no side effects, obtaining good pain control with functional improvement. Contineu gabapentin 800 mg PO TID. Start Tizanidine 4 mg Po BID PRN.     3. Rehabilitative: PT post acute exacerbation    4. Diagnostic: CT Cervical Spine to rule of cervical radiculopathy.     5. Follow up: 4 weeks.       >>Opioid Risk Tool:  12/20/2016 :: " 1    >>UDS:  12/20/2016 :: negative (at initial consult)  8/14/2017 :: negative     >> Pain Disability Index:  2/6/2017 :: 39  3/20/2017 :: 43  4/6/2017 :: 46  5/8/2017 :: 35  7/17/2017 :: 50  8/14/2017 :: 55  10/16/2017 :: 29    30 minutes were spent in this encounter with more than 50% of the time used for counseling and review of the plan.  Imaging / studies reviewed, detailed above.  I discussed in detail the risks, benefits, and alternatives to any and all potential treatment options.  All questions and concerns were fully addressed today in clinic. Medical decision making moderate.    Thank you for the opportunity to assist in the care of this patient.    Best wishes,    Signed:    Josue Benton MD          Disclaimer:  This note may have been prepared using voice recognition software, it may have not been extensively proofed, as such there could be errors within the text such as sound alike errors.

## 2018-02-08 ENCOUNTER — TELEPHONE (OUTPATIENT)
Dept: ORTHOPEDICS | Facility: CLINIC | Age: 48
End: 2018-02-08

## 2018-02-08 NOTE — TELEPHONE ENCOUNTER
Pt contacted to offer earlier appointment on 2/8/2018/    Mobile called. Voice mail not set up yet.    Home called. Wife answered stating she would relay message.

## 2018-02-09 ENCOUNTER — HOSPITAL ENCOUNTER (OUTPATIENT)
Dept: RADIOLOGY | Facility: HOSPITAL | Age: 48
Discharge: HOME | End: 2018-02-09
Attending: ORTHOPAEDIC SURGERY
Payer: COMMERCIAL

## 2018-02-09 ENCOUNTER — OFFICE VISIT (OUTPATIENT)
Dept: ORTHOPEDICS | Facility: CLINIC | Age: 48
End: 2018-02-09
Payer: COMMERCIAL

## 2018-02-09 VITALS
RESPIRATION RATE: 19 BRPM | WEIGHT: 220 LBS | DIASTOLIC BLOOD PRESSURE: 86 MMHG | HEART RATE: 84 BPM | HEIGHT: 76 IN | BODY MASS INDEX: 26.79 KG/M2 | SYSTOLIC BLOOD PRESSURE: 144 MMHG

## 2018-02-09 DIAGNOSIS — M25.551 BILATERAL HIP PAIN: ICD-10-CM

## 2018-02-09 DIAGNOSIS — M25.521 RIGHT ELBOW PAIN: Primary | ICD-10-CM

## 2018-02-09 DIAGNOSIS — M17.12 PRIMARY OSTEOARTHRITIS OF LEFT KNEE: Primary | ICD-10-CM

## 2018-02-09 DIAGNOSIS — M25.552 BILATERAL HIP PAIN: ICD-10-CM

## 2018-02-09 PROCEDURE — 3008F BODY MASS INDEX DOCD: CPT | Mod: S$GLB,,, | Performed by: ORTHOPAEDIC SURGERY

## 2018-02-09 PROCEDURE — 99213 OFFICE O/P EST LOW 20 MIN: CPT | Mod: S$GLB,,, | Performed by: ORTHOPAEDIC SURGERY

## 2018-02-09 PROCEDURE — 99999 PR PBB SHADOW E&M-EST. PATIENT-LVL IV: CPT | Mod: PBBFAC,,, | Performed by: ORTHOPAEDIC SURGERY

## 2018-02-09 PROCEDURE — 73521 X-RAY EXAM HIPS BI 2 VIEWS: CPT | Mod: 26,,, | Performed by: RADIOLOGY

## 2018-02-09 PROCEDURE — 73521 X-RAY EXAM HIPS BI 2 VIEWS: CPT | Mod: TC

## 2018-02-09 NOTE — PROGRESS NOTES
"Subjective:     Patient ID: Cornel Dick is a 47 y.o. male.    Chief Complaint: Pain of the Left Knee    History of reevaluation of left knee pain.  He had surgery scheduled with Dr. Ibarra in January, but scheduling conflict came up and I was asked if I could see him today.  He has had many years of left knee pain.  This pain is worsening with time.  He has tried extensive nonoperative treatment including injections including corticosteroid and hyaluronic acid injections, but these only gave him 1-2 weeks of relief.  He is also been using a medial  brace but this is been since he doesn't 13 and he feels the brace may be losing its effectiveness getting "worn out."  Note he also uses an ankle brace on the left side due to ankle pain.  He has tried physical therapy for at least 6 months in the past.  He takes Celebrex daily.  Despite all of these treatments, he continues to have medial sided knee pain also anterior knee pain.  This interferes with his daily activities.  Worse with standing and prolonged ambulation.  He uses a cane.  He did have arthroscopy of this left knee in 2012 with Dr. Ibarra.  He believes he had an injury playing sports when he was 18, he said he had surgery for this and that he "broke the bone in 3 places."  But he is not able to get much more information than this.      UPDATE: he is here for a recheck - his medial  brace may be helping but constantly is falling down, he cannot really tell if it is helping or not. He wants to know if he can get this readjusted.      Knee Pain    The pain is present in the left knee. The pain radiates to the left foot and groin. This is a chronic problem. There has been a history of trauma. The quality of the pain is described as aching, sharp and shooting. The pain is at a severity of 6/10. Associated symptoms include an inability to bear weight, joint locking, joint swelling, a limited range of motion, numbness and tingling. Pertinent " negatives include no fever or itching. The symptoms are aggravated by activity and bearing weight. He has tried injection treatment and oral narcotics for the symptoms. The treatment provided mild relief. Physical therapy was effective.      Past Medical History:   Diagnosis Date    Arthritis     HBP (high blood pressure)      Past Surgical History:   Procedure Laterality Date    KNEE ARTHROSCOPY Left      History reviewed. No pertinent family history.  Social History     Social History    Marital status:      Spouse name: N/A    Number of children: N/A    Years of education: N/A     Occupational History    Not on file.     Social History Main Topics    Smoking status: Never Smoker    Smokeless tobacco: Never Used    Alcohol use No    Drug use: No    Sexual activity: Not on file     Other Topics Concern    Not on file     Social History Narrative    No narrative on file     Medication List with Changes/Refills   Current Medications    ACETAMINOPHEN (ARTHRITIS PAIN RELIEVER ORAL)    Take by mouth.    AMLODIPINE (NORVASC) 10 MG TABLET    Take by mouth every evening.     AMLODIPINE (NORVASC) 10 MG TABLET    Take 10 mg by mouth.    BAICALIN-CATECHIN (LIMBREL) 500 MG CAP    Take 500 mg by mouth 2 (two) times daily.    CELECOXIB (CELEBREX) 200 MG CAPSULE    Take 1 capsule (200 mg total) by mouth 2 (two) times daily as needed for Pain.    EFINACONAZOLE (JUBLIA) 10 % ANITHA    Apply 1 application topically once daily.    GABAPENTIN (NEURONTIN) 800 MG TABLET    Take 1 tablet (800 mg total) by mouth 3 (three) times daily.    HYDRALAZINE (APRESOLINE) 100 MG TABLET        HYDROCODONE-ACETAMINOPHEN 5-325MG (NORCO) 5-325 MG PER TABLET    Take 1 tablet by mouth every 12 (twelve) hours as needed for Pain.    LISINOPRIL (PRINIVIL,ZESTRIL) 20 MG TABLET    Take 40 mg by mouth.    LISINOPRIL-HYDROCHLOROTHIAZIDE (PRINZIDE,ZESTORETIC) 20-12.5 MG PER TABLET        MV-MN/FA/LYCOPENE/LUT/HB#178 (HUBERT MULTIVITAMIN FOR MEN  ORAL)    Take by mouth.    OXAPROZIN (DAYPRO) 600 MG TABLET        PRAVASTATIN (PRAVACHOL) 40 MG TABLET    Take 40 mg by mouth every evening.     TIZANIDINE (ZANAFLEX) 4 MG TABLET    TAKE ONE TABLET BY MOUTH AT BEDTIME AS NEEDED    TIZANIDINE (ZANAFLEX) 4 MG TABLET    Take 1 tablet (4 mg total) by mouth 2 (two) times daily as needed.    TRIUMEQ 600- MG TAB         Review of patient's allergies indicates:   Allergen Reactions    Chocolate flavor Swelling     Review of Systems   Constitution: Negative for fever.   HENT: Negative for sore throat.    Eyes: Negative for blurred vision.   Cardiovascular: Negative for dyspnea on exertion.   Respiratory: Negative for shortness of breath.    Hematologic/Lymphatic: Does not bruise/bleed easily.   Skin: Negative for itching.   Gastrointestinal: Negative for vomiting.   Genitourinary: Negative for dysuria.   Neurological: Positive for numbness and tingling. Negative for dizziness.   Psychiatric/Behavioral: The patient does not have insomnia.        Objective:   Body mass index is 26.78 kg/m².  Vitals:    02/09/18 0916   BP: (!) 144/86   Pulse: 84   Resp: 19             General    Nursing note and vitals reviewed.  Constitutional: He is oriented to person, place, and time. He appears well-developed. No distress.   HENT:   Head: Normocephalic and atraumatic.   Eyes: EOM are normal.   Neck: Neck supple.   Cardiovascular: Normal rate.    Pulmonary/Chest: Effort normal.   Neurological: He is alert and oriented to person, place, and time.   Psychiatric: He has a normal mood and affect. His behavior is normal.           Right Knee Exam     Comments:  Exam of the right knee, unchanged, demonstrates no noticeable effusion.  Range of motions from 0-120° with no pain.  Hamstrings are well stretched.  Quadriceps tone is good.  Hip range of motion has very limited motion with 10° of external and 5° of internal at 90°.    Left Knee Exam     Comments:  Examination with standing  "demonstrates mild varus deformity of the left leg.  With ambulation he has no varus thrust.  He has a antalgic gait.  Exam of the left knee, unchanged, the left knee demonstrates moderate effusion.  He does have palpation over the medial joint line middle one third and posterior one third markedly.  Mildly tender to palpation over lateral joint line.  Tender to palpation over the border of the patella.  He does have full extension but can only flex to 90° with medial sided pain.  Crepitus noted to the patellofemoral joint with active and passive range of motion.  He is stable anterior drawer, stable locking, stable posterior drawer, stable varus valgus stress at 0 and 30°.  His varus does seem correctable at 30° with manual correction.  Mariana's was not tested today.  Distally he has sensation light touch intact to 2+ DP.  Pulse intact plantar flexion dorsiflexion tib ant and flexor hallucis longus.  His hip is very stiff with perhaps 5° of internal rotation and 10° of external rotation.  I can flex to 90 but again a great deal of hip stiffness.  His hamstrings are tight at 10°.  Quadriceps tone is mildly decreased.      Reviewed IMAGING PA standing flex view of bilateral knees today demonstrates end-stage, severe medial compartment osteoarthritis with "bone-on-bone" loss of joint space the medial compartment, subchondral sclerosis and osteophyte formation.  There is osteophyte formation with mild to moderate loss of patellofemoral joint space on the left.  Lateral joint space looks relatively well preserved.  Medial joint space on the right knee is mildly to moderately decreased but nowhere as severe as on the left.  Patellofemoral joint looks good on the right knee.      AP pelvis and AP and lateral of bilateral hips today shows well preserved hip joitns bilaterally with no substantial degenerative change. The implated spine nerve stimulator overlies part of right hip.    Assessment:     Encounter Diagnosis "   Name Primary?    Primary osteoarthritis of left knee Yes        Plan:     medial  brace - needs to be refitted to determine if it is actually helping or not  Continue celebrex  PT to work on ROM - continue  RTC 6 in weeks    Because of his young age and his very limited motion, I do think that another trial of therapy to work on range of motion, especially flexion, would be beneficial for him.  His prior medial  brace was very worn at this point, and I discussed with him that a new brace may be more effective for him.  I think is reasonable to try before committing to knee arthroplasty surgery.  He had this already fitted and supplied - but it is constantly falling down. He is already on Celebrex, so we will continue this.    He will follow up in 6 weeks to report on his progress. We did discuss that a TKA may still be the best treatment for him, but because of his young age, the goal will be to push this back as much as possible to decrease risk of early revision surgery. He is very much in agreement with this plan.    medial  brace - needs to be refitted to determine if it is actually helping or not    Frod Alcantara MD    Orthopaedic Surgery  [facility]

## 2018-02-12 ENCOUNTER — HOSPITAL ENCOUNTER (OUTPATIENT)
Dept: RADIOLOGY | Facility: HOSPITAL | Age: 48
Discharge: HOME | End: 2018-02-12
Attending: ANESTHESIOLOGY
Payer: COMMERCIAL

## 2018-02-12 DIAGNOSIS — M54.2 NECK PAIN: ICD-10-CM

## 2018-02-12 PROCEDURE — 72125 CT NECK SPINE W/O DYE: CPT | Mod: TC

## 2018-02-21 ENCOUNTER — OFFICE VISIT (OUTPATIENT)
Dept: INTERNAL MEDICINE | Facility: CLINIC | Age: 48
End: 2018-02-21
Payer: COMMERCIAL

## 2018-02-21 VITALS
HEIGHT: 76 IN | BODY MASS INDEX: 29.37 KG/M2 | DIASTOLIC BLOOD PRESSURE: 90 MMHG | SYSTOLIC BLOOD PRESSURE: 148 MMHG | OXYGEN SATURATION: 98 % | TEMPERATURE: 98 F | HEART RATE: 96 BPM | WEIGHT: 241.19 LBS

## 2018-02-21 DIAGNOSIS — N52.9 ERECTILE DYSFUNCTION, UNSPECIFIED ERECTILE DYSFUNCTION TYPE: Primary | Chronic | ICD-10-CM

## 2018-02-21 DIAGNOSIS — E78.5 DYSLIPIDEMIA: ICD-10-CM

## 2018-02-21 DIAGNOSIS — N28.9 RENAL INSUFFICIENCY: ICD-10-CM

## 2018-02-21 DIAGNOSIS — I10 BENIGN ESSENTIAL HYPERTENSION: Chronic | ICD-10-CM

## 2018-02-21 DIAGNOSIS — D64.9 ANEMIA, UNSPECIFIED TYPE: ICD-10-CM

## 2018-02-21 PROBLEM — N52.2 DRUG-INDUCED ERECTILE DYSFUNCTION: Chronic | Status: ACTIVE | Noted: 2018-02-21

## 2018-02-21 PROCEDURE — 3080F DIAST BP >= 90 MM HG: CPT | Mod: S$GLB,,, | Performed by: FAMILY MEDICINE

## 2018-02-21 PROCEDURE — 99204 OFFICE O/P NEW MOD 45 MIN: CPT | Mod: S$GLB,,, | Performed by: FAMILY MEDICINE

## 2018-02-21 PROCEDURE — 3077F SYST BP >= 140 MM HG: CPT | Mod: S$GLB,,, | Performed by: FAMILY MEDICINE

## 2018-02-21 PROCEDURE — 99999 PR PBB SHADOW E&M-EST. PATIENT-LVL V: CPT | Mod: PBBFAC,,, | Performed by: FAMILY MEDICINE

## 2018-02-21 RX ORDER — SILDENAFIL 100 MG/1
TABLET, FILM COATED ORAL
Qty: 12 TABLET | Refills: 1 | Status: SHIPPED | OUTPATIENT
Start: 2018-02-21 | End: 2018-03-06 | Stop reason: ALTCHOICE

## 2018-02-21 NOTE — PATIENT INSTRUCTIONS
Understanding Erectile Dysfunction    Erectile dysfunction (ED) is a problem getting an erection firm enough or keeping it long enough for intercourse. The problem can happen to any man at any age. But health problems that can lead to ED become more common as a man ages. Up to half of men over age 40 experience ED at some point.  Causes of ED  ED can have many causes. Most are physical. Some are emotional issues. Often, a combination of causes is involved. Causes of ED may include:  · Medical conditions such as diabetes or depression  · Smoking tobacco or marijuana  · Drinking too much alcohol  · Side effects of medications  · Injury to nerves or blood vessels  · Emotional issues such as stress or relationship problems  ED can be treated  Prescription medications for ED are available. They help many men who try them. Depending upon the cause of the ED, though, medications may not be enough. In these cases, other treatment options are available. These include erectile aids and surgery. Your health care provider can tell you more about the treatment that is right for you. And new treatments for ED are being studied. No matter what the treatment you decide on, stay in touch with your doctor. If your symptoms persist, he or she may be able to adjust your current treatment or try something new.  Date Last Reviewed: 1/1/2017  © 0311-0774 [employer]. [address] [place] [address], [place] [address]. All rights reserved. This information is not intended as a substitute for professional medical care. Always follow your healthcare professional's instructions.        Oral Medicines for Erectile Dysfunction  You can use prescription oral medicine for ED. They often work well. But, like all medicines, they can have side effects. Also, you cant use them if you have certain health problems or take certain other medicines. Talk with your doctor about oral ED medicine. Ask whether it is right for you.  Types of oral ED  medicines  There are three types of prescription oral ED medicines. Each one increases blood flow to the penis. When the penis is stimulated, an erection results. The types are:  · Sildenafil citrate   · Tadalafil   · Vardenafil  · Avanfil  What oral ED medicines dont do  There are some things ED medicines cant do.  · They dont cure the cause of your ED. Without the medicine, youll still have trouble getting an erection.  · They cant produce an erection without sexual stimulation.  · They wont increase sexual desire. They also wont solve any other sexual issues. Psychological, emotional, or relationship issues will not be fixed.  Taking oral ED medicines safely  · Do not combine ED medicines with other treatments unless your doctor tells you to.  · Dont take ED medicines more often or in larger doses than prescribed.  · Tell your doctor your health history. Mention all medicines you take. This includes over-the-counter drugs, supplements, and herbs.  · Ask your doctor about whether you can drink alcohol while taking ED medication.  Possible side effects of oral ED medicines  · Headache  · Facial flushing  · Runny or stuffy nose  · Indigestion  · Distortion of your color vision for a short time  · Sudden vision loss or hearing loss (rare)  · Dizziness  Risks of oral ED medicines  · Do not take ED medicines if you take medicines containing nitrates. The combination may drop your blood pressure to a dangerous level. Nitrates include nitroglycerin (a drug for angina or chest pain). They are also in poppers, an inhaled recreational drug. If youre not sure whether youre taking nitrates, ask your doctor or pharmacist.  · Medicines called alpha-blockers can interact with ED medicines. They can cause a sudden drop in blood pressure. Alpha-blockers are a common treatment for prostate problems. They also treat high blood pressure. Be sure your doctor knows if you take these medicines.  · If youve had a heart  attack or have heart disease and you have not had sex for a while, talk to your doctor. Having sex again can put extra strain on your heart. Your doctor can confirm that your heart is healthy enough for sex.  · It is rare, but some men taking ED medicines have had sudden vision loss. This may be more likely if other health problems are present. These include high blood pressure and diabetes. Ask your doctor if you are at risk for this type of vision loss.  · In rare cases, an erection may last too long. This can badly damage the blood vessels in your penis. If an erection lasts longer than 4 hours, go to the emergency room right away.   Date Last Reviewed: 1/1/2017 © 2000-2017 [employer]. [address] [place] [address], [place] [address]. All rights reserved. This information is not intended as a substitute for professional medical care. Always follow your healthcare professional's instructions.        Erectile Dysfunction: Rebuilding Intimacy     Man and woman sitting together on couch, smiling.     Being intimate means being close as a couple, with sex as just one part of intimacy. A hug, a kind remark, or a gift can be very romantic, even if sex doesnt follow. So renew your intimacy along with your sex life. Learn to talk with, and listen to, your partner. And remember that your value as a man goes beyond what you do in bed.  Tips for intimacy  As you and your partner become closer to each other, you might find that you can enjoy sex more.  · Show and tell your partner what you like. If you dont, your partner might not know what you want.  · Ask your partner to show you how he or she wants to be touched.  · Be patient. Take your time. Relax. Give yourselves a chance to become aroused.  · Try being intimate without intercourse. Instead, exchange back rubs. Or try kissing, or just a soft touch.  · Focus on what you and your partner like about each other. This could be a certain laugh or smile, or other  joys you share together.  Tips for talking  Its OK to be shy when you talk about sex with your partner. But talking gets easier with practice. Use these tips when you talk with each other.  · Choose a time and place when youre both relaxed and comfortable.  · Listen to your partner. Try repeating back what you think the other has said. This will help show if youve understood each other.  · Dont  what your partner says. Talking feels safer if you dont criticize each other.  · Dont be defensive. You may not like something your partner says. But you can still thank your partner for being honest.  · Think about meeting with a counselor. Theyre trained to help couples who are being treated for ED.  Date Last Reviewed: 1/1/2017  © 0742-3926 [employer]. [address] [place] [address], [place] [address]. All rights reserved. This information is not intended as a substitute for professional medical care. Always follow your healthcare professional's instructions.

## 2018-02-23 ENCOUNTER — LAB VISIT (OUTPATIENT)
Dept: LAB | Facility: HOSPITAL | Age: 48
End: 2018-02-23
Attending: FAMILY MEDICINE
Payer: COMMERCIAL

## 2018-02-23 DIAGNOSIS — N28.9 RENAL INSUFFICIENCY: ICD-10-CM

## 2018-02-23 DIAGNOSIS — N52.9 ERECTILE DYSFUNCTION, UNSPECIFIED ERECTILE DYSFUNCTION TYPE: Chronic | ICD-10-CM

## 2018-02-23 DIAGNOSIS — D64.9 ANEMIA, UNSPECIFIED TYPE: ICD-10-CM

## 2018-02-23 DIAGNOSIS — E78.5 DYSLIPIDEMIA: ICD-10-CM

## 2018-02-23 DIAGNOSIS — I10 BENIGN ESSENTIAL HYPERTENSION: Chronic | ICD-10-CM

## 2018-02-23 LAB
ALBUMIN SERPL BCP-MCNC: 3.5 G/DL
ALP SERPL-CCNC: 42 U/L
ALT SERPL W/O P-5'-P-CCNC: 36 U/L
ANION GAP SERPL CALC-SCNC: 11 MMOL/L
AST SERPL-CCNC: 30 U/L
BASOPHILS # BLD AUTO: 0.05 K/UL
BASOPHILS NFR BLD: 1.3 %
BILIRUB SERPL-MCNC: 0.2 MG/DL
BUN SERPL-MCNC: 10 MG/DL
CALCIUM SERPL-MCNC: 8.7 MG/DL
CHLORIDE SERPL-SCNC: 103 MMOL/L
CHOLEST SERPL-MCNC: 119 MG/DL
CHOLEST/HDLC SERPL: 2.9 {RATIO}
CO2 SERPL-SCNC: 25 MMOL/L
CREAT SERPL-MCNC: 1 MG/DL
DIFFERENTIAL METHOD: ABNORMAL
EOSINOPHIL # BLD AUTO: 0.2 K/UL
EOSINOPHIL NFR BLD: 6.2 %
ERYTHROCYTE [DISTWIDTH] IN BLOOD BY AUTOMATED COUNT: 15.3 %
EST. GFR  (AFRICAN AMERICAN): >60 ML/MIN/1.73 M^2
EST. GFR  (NON AFRICAN AMERICAN): >60 ML/MIN/1.73 M^2
FERRITIN SERPL-MCNC: 17 NG/ML
GLUCOSE SERPL-MCNC: 112 MG/DL
HCT VFR BLD AUTO: 36.9 %
HDLC SERPL-MCNC: 41 MG/DL
HDLC SERPL: 34.5 %
HGB BLD-MCNC: 11.7 G/DL
IMM GRANULOCYTES # BLD AUTO: 0.03 K/UL
IMM GRANULOCYTES NFR BLD AUTO: 0.8 %
IRON SERPL-MCNC: 89 UG/DL
LDLC SERPL CALC-MCNC: 58.8 MG/DL
LYMPHOCYTES # BLD AUTO: 1.5 K/UL
LYMPHOCYTES NFR BLD: 37.7 %
MCH RBC QN AUTO: 23.9 PG
MCHC RBC AUTO-ENTMCNC: 31.7 G/DL
MCV RBC AUTO: 76 FL
MONOCYTES # BLD AUTO: 0.3 K/UL
MONOCYTES NFR BLD: 8.5 %
NEUTROPHILS # BLD AUTO: 1.8 K/UL
NEUTROPHILS NFR BLD: 45.5 %
NONHDLC SERPL-MCNC: 78 MG/DL
NRBC BLD-RTO: 0 /100 WBC
PLATELET # BLD AUTO: 215 K/UL
PMV BLD AUTO: 12 FL
POTASSIUM SERPL-SCNC: 3.4 MMOL/L
PROT SERPL-MCNC: 6.9 G/DL
RBC # BLD AUTO: 4.89 M/UL
SATURATED IRON: 24 %
SODIUM SERPL-SCNC: 139 MMOL/L
TESTOST SERPL-MCNC: 339 NG/DL
TOTAL IRON BINDING CAPACITY: 364 UG/DL
TRANSFERRIN SERPL-MCNC: 246 MG/DL
TRIGL SERPL-MCNC: 96 MG/DL
TSH SERPL DL<=0.005 MIU/L-ACNC: 1.42 UIU/ML
VIT B12 SERPL-MCNC: 728 PG/ML
WBC # BLD AUTO: 3.9 K/UL

## 2018-02-23 PROCEDURE — 83540 ASSAY OF IRON: CPT

## 2018-02-23 PROCEDURE — 84402 ASSAY OF FREE TESTOSTERONE: CPT

## 2018-02-23 PROCEDURE — 85025 COMPLETE CBC W/AUTO DIFF WBC: CPT

## 2018-02-23 PROCEDURE — 82088 ASSAY OF ALDOSTERONE: CPT

## 2018-02-23 PROCEDURE — 82728 ASSAY OF FERRITIN: CPT

## 2018-02-23 PROCEDURE — 36415 COLL VENOUS BLD VENIPUNCTURE: CPT | Mod: PO

## 2018-02-23 PROCEDURE — 82607 VITAMIN B-12: CPT

## 2018-02-23 PROCEDURE — 80053 COMPREHEN METABOLIC PANEL: CPT

## 2018-02-23 PROCEDURE — 84443 ASSAY THYROID STIM HORMONE: CPT

## 2018-02-23 PROCEDURE — 84403 ASSAY OF TOTAL TESTOSTERONE: CPT

## 2018-02-23 PROCEDURE — 80061 LIPID PANEL: CPT

## 2018-02-26 ENCOUNTER — TELEPHONE (OUTPATIENT)
Dept: INTERNAL MEDICINE | Facility: CLINIC | Age: 48
End: 2018-02-26

## 2018-02-26 LAB — TESTOST FREE SERPL-MCNC: 4.9 PG/ML

## 2018-02-27 ENCOUNTER — TELEPHONE (OUTPATIENT)
Dept: INTERNAL MEDICINE | Facility: CLINIC | Age: 48
End: 2018-02-27

## 2018-02-27 NOTE — TELEPHONE ENCOUNTER
Per e-request received, initiated prior auth request to pt's ins for Viagra 100mg rx, submitted online via [email or web address] Request [id number].

## 2018-02-27 NOTE — TELEPHONE ENCOUNTER
----- Message from Gray Aguilar sent at 2/27/2018 12:50 PM CST -----  Contact: [facility]  She's calling in regards to a change in Rx medication, pls advise, [phone]

## 2018-02-27 NOTE — TELEPHONE ENCOUNTER
"Hi, Will.    I am responding to your message, which was sent to me as: "He needs the prescription for Sildenafil Â sent to another pharmacy that honors the coupon for the medicine."      RESPONSE:    Although we legally can send a duplicate new prescription to your new pharmacy, we try to avoid this when possible, because it increases the chances of medication errors. Also, I don't have a way of knowing which pharmacy will or will not accept the coupon.    I recommend that after you find a pharmacy that will accept the coupon, just tell that pharmacist to have the prescription transferred. (Pharmacists do not need a doctor's permission to transfer prescriptions.) That is the safest and simplest way to get what you want.    Thanks for letting me care for you.    Kind regards,    ZORAIDA Baird M.D.    Future Appointments  Date Time Provider Department Center   3/9/2018 1:20 PM Magda Bynum PA-C [facility]   3/23/2018 9:00 AM [facility] XR1-DR [facility]   3/23/2018 9:20 AM Ford Alcantara MD [facility]     ----------------------------------------------------------------  TO SCHEDULE OR CHANGE AN APPOINTMENT  ----------------------------------------------------------------  *Login to your [facility] account at [email or web address]  *Use the [employer] [place] on your mobile device   or  *Call our appointment desk at [phone].    ----------------------------------------------------------------  ADDITIONAL IMPORTANT INFORMATION  ----------------------------------------------------------------  *[employer] is NOT to be used for urgent needs.  *Although we try to respond to messages within 2 business days, a response can take longer, depending on circumstances.  *For medical emergencies, dial 911.    You can get help with [employer] and [employer] by email at  [email or web address] or by phone at [phone]. The [facility] Patient Support Team is available Monday through " Friday from 9 a.m. until 5 p.m.  (After hours, you can leave a message requesting assistance.)

## 2018-02-27 NOTE — PROGRESS NOTES
"TASK: Set a "Remind Me" reminder in Epic to verify in 1 week that he has read this message and scheduled his follow-up appointment as instructed. If not, please call him.  Thanks!  ZORAIDA Baird MD  --------------------------------------------------------------------------------    Hi, Will.    I have reviewed the results of recent tests you had done.     The CBC (complete blood count) checks for anemia and measures the red blood cells, white blood cells, and platelets in your blood. Your CBC is shows that you have a MILD TO MODERATE ANEMIA, but it is improved compared to your result 10 months ago. Your anemia NEED ADDITIONAL EVALUATION. PLEASE SCHEDULE AN APPOINTMENT within 6 weeks to discuss your results face-to-face and review what your test results mean and what needs to be done.    The CMP (comprehensive metabolic panel) checks kidney function, liver function, sodium, potassium, glucose (sugar) and other aspects of your metabolism. Your CMP is ESSENTIALLY NORMAL.    Your iron level is NORMAL.    Your lipid (cholesterol) panel shows that your cholesterol levels are GOOD.    Your TSH (thyroid stimulating hormone) is NORMAL.    Your Vitamin B12 level is NORMAL.    Your testosterone level is NORMAL. Although your testosterone level is closer to the low end of the normal range, this is still considered NORMAL and does not indicate that you need testosterone supplementation.     I am still waiting on the results of one test (aldosterone/renin ratio, which looks for a hormone problem that can cause high blood pressure. I'll share it with you after I receive it.    To learn more about your test results and what they mean, click on the "About this test" link from your [facility] account ([email or web address]) or from your [employer] [place]. We can discuss your results further when you return for your next appointment.    Thank you for letting me care for you. I look forward to seeing you again. Let me know if " you have any questions in the meantime.    Wishing you health and happiness,    ZORAIDA Baird MD  ----------------------------------------------------------------  UPCOMING APPOINTMENTS  ----------------------------------------------------------------  Your future appointments include:  3/9/2018   1:20 PM    [facility]      [facility]  3/23/2018  9:00 AM    [facility] XR1-DR [facility]      [facility]  3/23/2018  9:20 AM    Ford Alcantara MD     [facility]    ----------------------------------------------------------------  TO SCHEDULE OR CHANGE AN APPOINTMENT  ----------------------------------------------------------------  *Login to your [facility] account at [email or web address]  *Use the [employer] [place] on your mobile device   or  *Call our appointment desk at [phone].    ----------------------------------------------------------------  ADDITIONAL IMPORTANT INFORMATION  ----------------------------------------------------------------  *[employer] is NOT to be used for urgent needs.  *Although we try to respond to messages within 2 business days, a response can take longer, depending on circumstances.  *For medical emergencies, dial 911.    You can get help with [employer] and [facility] by email at  [email or web address] or by phone at [phone]. The [facility] Patient Support Team is available Monday through Friday from 9 a.m. until 5 p.m.  (After hours, you can leave a message requesting assistance.)

## 2018-02-27 NOTE — TELEPHONE ENCOUNTER
Spoke with pharmacist, Viagra is over 200.00 dollars on patients insurance, they will change to comparable medication that is covered under insurance

## 2018-02-28 LAB
ALDOST SERPL-MCNC: 10.4 NG/DL
ALDOST/RENIN PLAS-RTO: 0.2 RATIO
RENIN PLAS-CCNC: 52.6 NG/ML/HR

## 2018-03-01 NOTE — PROGRESS NOTES
HEALTH MAINTENANCE REVIEW  Health Maintenance   Topic Date Due    TETANUS VACCINE  06/25/1989    Influenza Vaccine  08/01/2017    Lipid Panel  02/23/2023        HEALTH MAINTENANCE INTERVENTIONS - DUE OR DUE SOON  Health Maintenance Due   Topic Date Due    TETANUS VACCINE  06/25/1989    Sign Pain Contract  06/25/1989    Complete Opioid Risk Tool  06/25/1989    Influenza Vaccine  08/01/2017       FUTURE APPOINTMENTS  Future Appointments  Date Time Provider Department Center   3/6/2018 10:00 AM ZORAIDA Baird MD [facility]   3/9/2018 1:20 PM Magda Bynum PA-C ON IN [facility]   3/23/2018 9:00 AM ONLCELIA XR1- ON X[facility]   3/23/2018 9:20 AM Ford Alcantara MD ON [facility]TH[facility]       CHIEF COMPLAINT  Establish Care      HISTORY OF PRESENT ILLNESS  PROBLEM/CONDITION: Primary complaint is erectile dysfunction. QUALITY described as decreased QUALITY of erection and difficulty sustaining satisfactory erection. ONSET gradual over the last few years. ASSOCIATED SYMPTOMS do NOT include decreased libido, although he is very anxious about the possibility of having low testosterone, and he wants to be tested. I educated him about the risks and benefits of testing/treating for low testosterone. EXACERBATING FACTORS include his antihypertensive medications. He describes no angina or angina equivalent symptoms. He describes a personal aerobic capacity equal to or greater than 6 minutes without symptoms. He does not take nitrates and has no other known contraindication to PDE-5 inhibitors. We discussed risks and benefits of treatment options. I found him an online coupon for Viagra, and it was agreed to give him a trial of therapy with Viagra.    PROBLEM/CONDITION: Previous labs showed anemia. He reports no melon or blood in stool or other bleeding problems.    PROBLEM/CONDITION: Previous labs showed a degree of renal insufficiency. He reports no decreased urine output.    PROBLEM/CONDITION:  "Hypertension appears fairly controlled. No angina or angina equivalent symptoms reported. Therapeutic lifestyle changes encouraged.    PROBLEM/CONDITION: Appears to be tolerating statin for dyslipidemia without apparent myositis or hepatic dysfunction.    No other complaints or concerns reported.    Problem List Items Addressed This Visit     Anemia    Relevant Orders    CBC W/ AUTO DIFFERENTIAL (Completed)    Iron and TIBC (Completed)    Ferritin (Completed)    Vitamin B12 (Completed)    Renal insufficiency    Relevant Orders    COMPREHENSIVE METABOLIC PANEL (Completed)    Dyslipidemia    Relevant Orders    LIPID PANEL (Completed)    Erectile dysfunction - Primary (Chronic)    Relevant Medications    sildenafil (VIAGRA) 100 MG tablet    Other Relevant Orders    TESTOSTERONE (Completed)    Testosterone, free (Completed)    Benign essential hypertension (Chronic)    Relevant Orders    TSH (Completed)    Aldosterone/Renin Activity Ratio (Completed)          REVIEW OF SYSTEMS  CONSTITUTIONAL: No fever reported.  CARDIOVASCULAR: No chest pain reported.  PULMONARY: No trouble breathing reported.     PHYSICAL EXAM  Vitals:    02/21/18 1402   BP: (!) 148/90   BP Location: Right arm   Patient Position: Sitting   BP Method: Medium (Manual)   Pulse: 96   Temp: 97.8 °F (36.6 °C)   TempSrc: Tympanic   SpO2: 98%   Weight: 109.4 kg (241 lb 2.9 oz)   Height: 6' 4" (1.93 m)     CONSTITUTIONAL: Vital signs noted. No apparent distress. Does not appear acutely ill or septic. Appears adequately hydrated.  EYE: Sclerae anicteric. Lids and conjunctiva unremarkable.  ENT: External ENT unremarkable. Oropharynx moist.  NECK: Trachea midline. Thyroid nontender.  PULM: Lungs clear. Breathing unlabored.  CV: Auscultation reveals regular rate and rhythm without murmur, gallop or rub. No carotid bruit.   GI: Soft and nontender. Bowel sounds normal.  GENITOURINARY: Normal appearing external male genitalia. Scrotal contents unremarkable " on palpation. Testicles symmetric without abnormal mass. No inguinal hernias.  DERM: Skin warm and moist with normal turgor.  NEURO: There are no gross focal motor deficits or gross deficits of cranial nerves III-XII.  PSYCH: Alert and oriented x 3. Mood is grossly neutral. Affect appropriate. Judgment and insight not grossly compromised.  MSK: Grossly normal stance and gait.      PAST MEDICAL HISTORY, FAMILY HISTORY, SOCIAL HISTORY, CURRENT MEDICATION LIST, and ALLERGY LIST reviewed by me (ZORAIDA Baird MD) and are updated consistent with the patient's report.    ASSESSMENT and PLAN  Erectile dysfunction, unspecified erectile dysfunction type  -     TESTOSTERONE; Future; Expected date: 02/21/2018  -     Testosterone, free; Future; Expected date: 02/21/2018  -     sildenafil (VIAGRA) 100 MG tablet; Take ONE-HALF tablet by mouth once daily as needed, 30 minutes before sexual activity  Dispense: 12 tablet; Refill: 1    Anemia, unspecified type  -     CBC W/ AUTO DIFFERENTIAL; Future; Expected date: 02/21/2018  -     Iron and TIBC; Future; Expected date: 02/21/2018  -     Ferritin; Future; Expected date: 02/21/2018  -     Vitamin B12; Future; Expected date: 02/21/2018    Renal insufficiency  -     COMPREHENSIVE METABOLIC PANEL; Future; Expected date: 02/21/2018    Benign essential hypertension  -     TSH; Future; Expected date: 02/21/2018  -     Aldosterone/Renin Activity Ratio; Future; Expected date: 02/21/2018    Dyslipidemia  -     LIPID PANEL; Future; Expected date: 02/21/2018        PRESCRIPTION MEDICATION MANAGEMENT  Medication List with Changes/Refills   New Medications    SILDENAFIL (VIAGRA) 100 MG TABLET    Take ONE-HALF tablet by mouth once daily as needed, 30 minutes before sexual activity   Current Medications    ACETAMINOPHEN (ARTHRITIS PAIN RELIEVER ORAL)    Take by mouth.    AMLODIPINE (NORVASC) 10 MG TABLET    Take 10 mg by mouth.    BAICALIN-CATECHIN (LIMBREL) 500 MG CAP    Take 500 mg by  "mouth 2 (two) times daily.    CELECOXIB (CELEBREX) 200 MG CAPSULE    Take 1 capsule (200 mg total) by mouth 2 (two) times daily as needed for Pain.    EFINACONAZOLE (JUBLIA) 10 % ANITHA    Apply 1 application topically once daily.    GABAPENTIN (NEURONTIN) 800 MG TABLET    Take 1 tablet (800 mg total) by mouth 3 (three) times daily.    HYDRALAZINE (APRESOLINE) 100 MG TABLET        HYDROCODONE-ACETAMINOPHEN 5-325MG (NORCO) 5-325 MG PER TABLET    Take 1 tablet by mouth every 12 (twelve) hours as needed for Pain.    LISINOPRIL-HYDROCHLOROTHIAZIDE (PRINZIDE,ZESTORETIC) 20-12.5 MG PER TABLET        MV-MN/FA/LYCOPENE/LUT/HB#178 (HUBERT MULTIVITAMIN FOR MEN ORAL)    Take by mouth.    OXAPROZIN (DAYPRO) 600 MG TABLET        PRAVASTATIN (PRAVACHOL) 40 MG TABLET    Take 40 mg by mouth every evening.     TIZANIDINE (ZANAFLEX) 4 MG TABLET    Take 1 tablet (4 mg total) by mouth 2 (two) times daily as needed.    TRIUMEQ 600- MG TAB       Discontinued Medications    AMLODIPINE (NORVASC) 10 MG TABLET    Take by mouth every evening.     LISINOPRIL (PRINIVIL,ZESTRIL) 20 MG TABLET    Take 40 mg by mouth.    TIZANIDINE (ZANAFLEX) 4 MG TABLET    TAKE ONE TABLET BY MOUTH AT BEDTIME AS NEEDED       Follow-up in about 3 weeks (around 3/14/2018) for review test results and discuss treatment plan, re-evaluate problem(s) discussed today.    ABOUT THIS DOCUMENTATION:  · The order of the conditions listed in the HPI is one of convenience and does not necessarily reflect the chronology of the appointment, nor the relative importance of a condition. It is possible that additional description or status details about condition(s) may be found elsewhere in the EHR documentation for today's encounter.  · Documentation entered by me for this encounter was done in part using speech-recognition technology. Although I have made an effort to ensure accuracy, "sound like" errors may exist and should be interpreted in context.                        -L. " Tano Baird MD    Patient Instructions       Understanding Erectile Dysfunction    Erectile dysfunction (ED) is a problem getting an erection firm enough or keeping it long enough for intercourse. The problem can happen to any man at any age. But health problems that can lead to ED become more common as a man ages. Up to half of men over age 40 experience ED at some point.  Causes of ED  ED can have many causes. Most are physical. Some are emotional issues. Often, a combination of causes is involved. Causes of ED may include:  · Medical conditions such as diabetes or depression  · Smoking tobacco or marijuana  · Drinking too much alcohol  · Side effects of medications  · Injury to nerves or blood vessels  · Emotional issues such as stress or relationship problems  ED can be treated  Prescription medications for ED are available. They help many men who try them. Depending upon the cause of the ED, though, medications may not be enough. In these cases, other treatment options are available. These include erectile aids and surgery. Your health care provider can tell you more about the treatment that is right for you. And new treatments for ED are being studied. No matter what the treatment you decide on, stay in touch with your doctor. If your symptoms persist, he or she may be able to adjust your current treatment or try something new.  Date Last Reviewed: 1/1/2017  © 5716-5345 [employer]. [address] [place] [address], [place] [address]. All rights reserved. This information is not intended as a substitute for professional medical care. Always follow your healthcare professional's instructions.        Oral Medicines for Erectile Dysfunction  You can use prescription oral medicine for ED. They often work well. But, like all medicines, they can have side effects. Also, you cant use them if you have certain health problems or take certain other medicines. Talk with your doctor about oral ED medicine. Ask  whether it is right for you.  Types of oral ED medicines  There are three types of prescription oral ED medicines. Each one increases blood flow to the penis. When the penis is stimulated, an erection results. The types are:  · Sildenafil citrate   · Tadalafil   · Vardenafil  · Avanfil  What oral ED medicines dont do  There are some things ED medicines cant do.  · They dont cure the cause of your ED. Without the medicine, youll still have trouble getting an erection.  · They cant produce an erection without sexual stimulation.  · They wont increase sexual desire. They also wont solve any other sexual issues. Psychological, emotional, or relationship issues will not be fixed.  Taking oral ED medicines safely  · Do not combine ED medicines with other treatments unless your doctor tells you to.  · Dont take ED medicines more often or in larger doses than prescribed.  · Tell your doctor your health history. Mention all medicines you take. This includes over-the-counter drugs, supplements, and herbs.  · Ask your doctor about whether you can drink alcohol while taking ED medication.  Possible side effects of oral ED medicines  · Headache  · Facial flushing  · Runny or stuffy nose  · Indigestion  · Distortion of your color vision for a short time  · Sudden vision loss or hearing loss (rare)  · Dizziness  Risks of oral ED medicines  · Do not take ED medicines if you take medicines containing nitrates. The combination may drop your blood pressure to a dangerous level. Nitrates include nitroglycerin (a drug for angina or chest pain). They are also in poppers, an inhaled recreational drug. If youre not sure whether youre taking nitrates, ask your doctor or pharmacist.  · Medicines called alpha-blockers can interact with ED medicines. They can cause a sudden drop in blood pressure. Alpha-blockers are a common treatment for prostate problems. They also treat high blood pressure. Be sure your doctor knows if you take  these medicines.  · If youve had a heart attack or have heart disease and you have not had sex for a while, talk to your doctor. Having sex again can put extra strain on your heart. Your doctor can confirm that your heart is healthy enough for sex.  · It is rare, but some men taking ED medicines have had sudden vision loss. This may be more likely if other health problems are present. These include high blood pressure and diabetes. Ask your doctor if you are at risk for this type of vision loss.  · In rare cases, an erection may last too long. This can badly damage the blood vessels in your penis. If an erection lasts longer than 4 hours, go to the emergency room right away.   Date Last Reviewed: 1/1/2017 © 2000-2017 [employer]. [address] [place] [address], [place] [address]. All rights reserved. This information is not intended as a substitute for professional medical care. Always follow your healthcare professional's instructions.        Erectile Dysfunction: Rebuilding Intimacy     Man and woman sitting together on couch, smiling.     Being intimate means being close as a couple, with sex as just one part of intimacy. A hug, a kind remark, or a gift can be very romantic, even if sex doesnt follow. So renew your intimacy along with your sex life. Learn to talk with, and listen to, your partner. And remember that your value as a man goes beyond what you do in bed.  Tips for intimacy  As you and your partner become closer to each other, you might find that you can enjoy sex more.  · Show and tell your partner what you like. If you dont, your partner might not know what you want.  · Ask your partner to show you how he or she wants to be touched.  · Be patient. Take your time. Relax. Give yourselves a chance to become aroused.  · Try being intimate without intercourse. Instead, exchange back rubs. Or try kissing, or just a soft touch.  · Focus on what you and your partner like about each other. This  could be a certain laugh or smile, or other joys you share together.  Tips for talking  Its OK to be shy when you talk about sex with your partner. But talking gets easier with practice. Use these tips when you talk with each other.  · Choose a time and place when youre both relaxed and comfortable.  · Listen to your partner. Try repeating back what you think the other has said. This will help show if youve understood each other.  · Dont  what your partner says. Talking feels safer if you dont criticize each other.  · Dont be defensive. You may not like something your partner says. But you can still thank your partner for being honest.  · Think about meeting with a counselor. Theyre trained to help couples who are being treated for ED.  Date Last Reviewed: 1/1/2017  © 8308-6015 [employer]. [address] [place] [address], [place] [address]. All rights reserved. This information is not intended as a substitute for professional medical care. Always follow your healthcare professional's instructions.

## 2018-03-06 ENCOUNTER — OFFICE VISIT (OUTPATIENT)
Dept: INTERNAL MEDICINE | Facility: CLINIC | Age: 48
End: 2018-03-06
Payer: COMMERCIAL

## 2018-03-06 VITALS
HEART RATE: 61 BPM | TEMPERATURE: 96 F | WEIGHT: 242.94 LBS | DIASTOLIC BLOOD PRESSURE: 68 MMHG | BODY MASS INDEX: 29.58 KG/M2 | HEIGHT: 76 IN | SYSTOLIC BLOOD PRESSURE: 112 MMHG | OXYGEN SATURATION: 98 %

## 2018-03-06 DIAGNOSIS — I10 BENIGN ESSENTIAL HYPERTENSION: Chronic | ICD-10-CM

## 2018-03-06 DIAGNOSIS — Z91.89 RISK FOR CORONARY ARTERY DISEASE BETWEEN 10% AND 20% IN NEXT 10 YEARS: ICD-10-CM

## 2018-03-06 DIAGNOSIS — R73.09 ABNORMAL GLUCOSE: ICD-10-CM

## 2018-03-06 DIAGNOSIS — D50.9 IRON DEFICIENCY ANEMIA, UNSPECIFIED IRON DEFICIENCY ANEMIA TYPE: Primary | ICD-10-CM

## 2018-03-06 DIAGNOSIS — N52.9 ERECTILE DYSFUNCTION, UNSPECIFIED ERECTILE DYSFUNCTION TYPE: Chronic | ICD-10-CM

## 2018-03-06 PROCEDURE — 99999 PR PBB SHADOW E&M-EST. PATIENT-LVL IV: CPT | Mod: PBBFAC,,, | Performed by: FAMILY MEDICINE

## 2018-03-06 PROCEDURE — 3074F SYST BP LT 130 MM HG: CPT | Mod: S$GLB,,, | Performed by: FAMILY MEDICINE

## 2018-03-06 PROCEDURE — 3078F DIAST BP <80 MM HG: CPT | Mod: S$GLB,,, | Performed by: FAMILY MEDICINE

## 2018-03-06 PROCEDURE — 99214 OFFICE O/P EST MOD 30 MIN: CPT | Mod: S$GLB,,, | Performed by: FAMILY MEDICINE

## 2018-03-06 RX ORDER — SIMVASTATIN 20 MG/1
20 TABLET, FILM COATED ORAL NIGHTLY
Qty: 90 TABLET | Refills: 3 | Status: SHIPPED | OUTPATIENT
Start: 2018-03-06 | End: 2019-03-06

## 2018-03-06 RX ORDER — SILDENAFIL CITRATE 20 MG/1
TABLET ORAL
Refills: 1 | COMMUNITY
Start: 2018-02-27

## 2018-03-06 NOTE — PROGRESS NOTES
HEALTH MAINTENANCE REVIEW  Health Maintenance   Topic Date Due    TETANUS VACCINE  06/25/1989    Influenza Vaccine  08/01/2017    Lipid Panel  02/23/2023        HEALTH MAINTENANCE INTERVENTIONS - DUE OR DUE SOON  Health Maintenance Due   Topic Date Due    TETANUS VACCINE  06/25/1989    Sign Pain Contract  06/25/1989    Complete Opioid Risk Tool  06/25/1989    Influenza Vaccine  08/01/2017       FUTURE APPOINTMENTS  Future Appointments  Date Time Provider Department Center   3/9/2018 1:20 PM Magda Bynum PA-C ON IN [facility]   3/23/2018 9:00 AM [facility] XR1- [facility]   3/23/2018 9:20 AM Ford Alcantara MD ON [facility]       CHIEF COMPLAINT  Results      HISTORY OF PRESENT ILLNESS  PROBLEM/CONDITION: We reviewed his recent abnormal test results, which are included below. They are remarkable for MILD anemia, apparently iron deficient in origin. He reports no blood in his stool or dark tarry stool. He reports no bleeding problems. We discussed differential diagnosis. He is going to provide stool specimen for fecal occult blood testing x 3 and increased dietary iron consumption, and we will recheck his hemoglobin and hematocrit in 6 months.    PROBLEM/CONDITION: Labs also remarkable for MILD nonspecific elevated glucose (112 mg/dL), and he reports that he had about 6 ounces of sweet tea one hour prior to having his blood drawn. He reports no symptoms of diabetes. It was agreed to recheck his blood sugar annually.    PROBLEM/CONDITION: His lipid panel shows that his cholesterol numbers are good. Nevertheless, rough calculation of his 10-year coronary heart disease risk estimates it to be approximately 11%. He reports that he has NOT been taking pravastatin. We discussed risks and benefits of treatment options, specifically statins, and it was agreed to reinitiate statin (simvastatin) given his estimated coronary heart disease risk and family history of coronary heart  disease.    PROBLEM/CONDITION: Hypertension appears well-controlled. No angina or angina equivalent symptoms reported.    PROBLEM/CONDITION: He obtained sildenafil in the form of the 20 mg tablet, and he reports that he get satisfactory relief from his erectile dysfunction using only one or 2 tablets. He tolerates this medication well without adverse effect. He says that he may want to try Cialis for its longer duration of effect, if he can find a coupon that makes it affordable.    PROBLEM/CONDITION: He did not bring his medication bottles with him today, and there appears to be a degree of confusion on his part with regards to exactly what medications he is taking. He agreed to either bring his prescription models in for review for review by my staff so that we can ensure our records are accurate OR he will take pictures of his current prescription medications and send them to me via the patient portal.    No other complaints or concerns reported.    Problem List Items Addressed This Visit     Iron deficiency anemia - Primary    Relevant Orders    Occult blood x 1, stool    CBC Without Differential    Risk for coronary artery disease between 10% and 20% in next 10 years    Overview     10 year CHD risk ~ 11% 03/06/2018         Relevant Medications    simvastatin (ZOCOR) 20 MG tablet    Other Relevant Orders    CBC Without Differential    Erectile dysfunction (Chronic)    Benign essential hypertension (Chronic)      Other Visit Diagnoses     Abnormal glucose            Lab Visit on 02/23/2018   Component Date Value Ref Range Status    WBC 02/23/2018 3.90  3.90 - 12.70 K/uL Final    RBC 02/23/2018 4.89  4.60 - 6.20 M/uL Final    Hemoglobin 02/23/2018 11.7* 14.0 - 18.0 g/dL Final    Hematocrit 02/23/2018 36.9* 40.0 - 54.0 % Final    MCV 02/23/2018 76* 82 - 98 fL Final    MCH 02/23/2018 23.9* 27.0 - 31.0 pg Final    MCHC 02/23/2018 31.7* 32.0 - 36.0 g/dL Final    RDW 02/23/2018 15.3* 11.5 - 14.5 % Final     Platelets 02/23/2018 215  150 - 350 K/uL Final    MPV 02/23/2018 12.0  9.2 - 12.9 fL Final    Immature Granulocytes 02/23/2018 0.8* 0.0 - 0.5 % Final    Gran # (ANC) 02/23/2018 1.8  1.8 - 7.7 K/uL Final    Immature Grans (Abs) 02/23/2018 0.03  0.00 - 0.04 K/uL Final    Comment: Mild elevation in immature granulocytes is non specific and   can be seen in a variety of conditions including stress response,   acute inflammation, trauma and pregnancy. Correlation with other   laboratory and clinical findings is essential.      Lymph # 02/23/2018 1.5  1.0 - 4.8 K/uL Final    Mono # 02/23/2018 0.3  0.3 - 1.0 K/uL Final    Eos # 02/23/2018 0.2  0.0 - 0.5 K/uL Final    Baso # 02/23/2018 0.05  0.00 - 0.20 K/uL Final    nRBC 02/23/2018 0  0 /100 WBC Final    Gran% 02/23/2018 45.5  38.0 - 73.0 % Final    Lymph% 02/23/2018 37.7  18.0 - 48.0 % Final    Mono% 02/23/2018 8.5  4.0 - 15.0 % Final    Eosinophil% 02/23/2018 6.2  0.0 - 8.0 % Final    Basophil% 02/23/2018 1.3  0.0 - 1.9 % Final    Differential Method 02/23/2018 Automated   Final    Sodium 02/23/2018 139  136 - 145 mmol/L Final    Potassium 02/23/2018 3.4* 3.5 - 5.1 mmol/L Final    Chloride 02/23/2018 103  95 - 110 mmol/L Final    CO2 02/23/2018 25  23 - 29 mmol/L Final    Glucose 02/23/2018 112* 70 - 110 mg/dL Final    BUN, Bld 02/23/2018 10  6 - 20 mg/dL Final    Creatinine 02/23/2018 1.0  0.5 - 1.4 mg/dL Final    Calcium 02/23/2018 8.7  8.7 - 10.5 mg/dL Final    Total Protein 02/23/2018 6.9  6.0 - 8.4 g/dL Final    Albumin 02/23/2018 3.5  3.5 - 5.2 g/dL Final    Total Bilirubin 02/23/2018 0.2  0.1 - 1.0 mg/dL Final    Comment: For infants and newborns, interpretation of results should be based  on gestational age, weight and in agreement with clinical  observations.  Premature Infant recommended reference ranges:  Up to 24 hours.............<8.0 mg/dL  Up to 48 hours............<12.0 mg/dL  3-5 days..................<15.0 mg/dL  6-29  days.................<15.0 mg/dL      Alkaline Phosphatase 02/23/2018 42* 55 - 135 U/L Final    AST 02/23/2018 30  10 - 40 U/L Final    ALT 02/23/2018 36  10 - 44 U/L Final    Anion Gap 02/23/2018 11  8 - 16 mmol/L Final    eGFR if African American 02/23/2018 >60.0  >60 mL/min/1.73 m^2 Final    eGFR if non African American 02/23/2018 >60.0  >60 mL/min/1.73 m^2 Final    Comment: Calculation used to obtain the estimated glomerular filtration  rate (eGFR) is the CKD-EPI equation.       TSH 02/23/2018 1.417  0.400 - 4.000 uIU/mL Final    Cholesterol 02/23/2018 119* 120 - 199 mg/dL Final    Comment: The National Cholesterol Education Program (NCEP) has set the  following guidelines (reference ranges) for Cholesterol:  Optimal.....................<200 mg/dL  Borderline High.............200-239 mg/dL  High........................> or = 240 mg/dL      Triglycerides 02/23/2018 96  30 - 150 mg/dL Final    Comment: The National Cholesterol Education Program (NCEP) has set the  following guidelines (reference values) for triglycerides:  Normal......................<150 mg/dL  Borderline High.............150-199 mg/dL  High........................200-499 mg/dL      HDL 02/23/2018 41  40 - 75 mg/dL Final    Comment: The National Cholesterol Education Program (NCEP) has set the  following guidelines (reference values) for HDL Cholesterol:  Low...............<40 mg/dL  Optimal...........>60 mg/dL      LDL Cholesterol 02/23/2018 58.8* 63.0 - 159.0 mg/dL Final    Comment: The National Cholesterol Education Program (NCEP) has set the  following guidelines (reference values) for LDL Cholesterol:  Optimal.......................<130 mg/dL  Borderline High...............130-159 mg/dL  High..........................160-189 mg/dL  Very High.....................>190 mg/dL      HDL/Chol Ratio 02/23/2018 34.5  20.0 - 50.0 % Final    Total Cholesterol/HDL Ratio 02/23/2018 2.9  2.0 - 5.0 Final    Non-HDL Cholesterol 02/23/2018 78   mg/dL Final    Comment: Risk category and Non-HDL cholesterol goals:  Coronary heart disease (CHD)or equivalent (10-year risk of CHD >20%):  Non-HDL cholesterol goal     <130 mg/dL  Two or more CHD risk factors and 10-year risk of CHD <= 20%:  Non-HDL cholesterol goal     <160 mg/dL  0 to 1 CHD risk factor:  Non-HDL cholesterol goal     <190 mg/dL      Testosterone, Total 2018 339  195.0 - 1138.0 ng/dL Final    Testosterone, Free 2018 4.9* 5.1 - 41.5 pg/mL Final    Comment: Test performed at [facility],  [address] [id number]     [phone]  Cornel Mancilla MD  - Medical Director      Aldosterone 2018 10.4  ng/dL Final    Comment: INTERPRETIVE INFORMATION: Aldosterone, Serum  Reference intervals for age 15 and older:  Upright .........  4.0 - 31.0 ng/dL  Supine ..........  Less than or equal to 16.0 ng/dL  Unspecified .....  Less than or equal to 31.0 ng/dL  Normal serum levels of aldosterone are dependent on the   sodium intake and whether the patient is upright or supine.   High sodium intake will tend to suppress serum aldosterone,   whereas low sodium intake will elevate serum aldosterone.   The reference intervals for serum aldosterone are based on   normal sodium intake.   Access complete set of age- and/or gender-specific   reference intervals for this test in the [facility]   Test Directory ([employer]).      Renin 2018 52.6  ng/mL/hr Final    Comment: Specimen diluted and results confirmed.  INTERPRETIVE INFORMATION: Renin Activity  Adult, Normal sodium diet:  Supine ................. 0.2-1.6 ng/mL/hr  Upright ................ 0.5-4.0 ng/mL/hr  Children, Normal sodium diet, Supine:   (1-7 days) ..... 2.0-35.0 ng/mL/hr  Cord blood ............. 4.0-32.0 ng/mL/hr  1-12 mos ............... 2.4-37.0 ng/mL/hr  13 mos-3 yrs ........... 1.7-11.2 ng/mL/hr  4-5 yrs ................ 1.0- 6.5 ng/mL/hr  6-10 yrs ............... 0.5- 5.9  "ng/mL/hr  11-15 yrs .............. 0.5- 3.3 ng/mL/hr  Children, normal sodium diet, Upright:  0-3 yrs ................ Not Available  4-5 yrs ................ Less than or equal to 15 ng/mL/hr  6-10 yrs ............... Less than or equal to 17 ng/mL/hr  11-15 yrs .............. Less than or equal to 16 ng/mL/hr  Plasma renin activity measures enzyme ability to convert   angiotensinogen to angiotensin I and is limited by the   availability of angiotensinogen. Plasma renin activity is   not an accurate kailee                           cator of enzyme activity when   angiotensinogen is decreased.  See Compliance Statement D: www.[facility].[email or web address]      Aldosterone/Renin Activity Calcula* 02/23/2018 0.2  <=25.0 ratio Final    Comment: INTERPRETIVE INFORMATION: A/RA Ratio Calculation  Aldosterone/Renin Activity Ratio: Less than 25  An Aldosterone/Renin Activity Ratio of greater than 25 is   suggestive of hyperaldosteronism if the aldosterone   concentration is greater than 15 ng/dL.  Performed by [facility],                              [address] [place] [address] [phone]                    [email or web address], Trey Harrington MD - Lab. Director      Iron 02/23/2018 89  45 - 160 ug/dL Final    Transferrin 02/23/2018 246  200 - 375 mg/dL Final    TIBC 02/23/2018 364  250 - 450 ug/dL Final    Saturated Iron 02/23/2018 24  20 - 50 % Final    Ferritin 02/23/2018 17* 20.0 - 300.0 ng/mL Final    Vitamin B-12 02/23/2018 728  210 - 950 pg/mL Final         REVIEW OF SYSTEMS  CARDIOVASCULAR: No angina or orthopnea reported.  ENDOCRINE: No polyuria or polydipsia reported.    PHYSICAL EXAM  Vitals:    03/06/18 1012   BP: 112/68   BP Location: Right arm   Patient Position: Sitting   BP Method: Medium (Manual)   Pulse: 61   Temp: 96.4 °F (35.8 °C)   TempSrc: Tympanic   SpO2: 98%   Weight: 110.2 kg (242 lb 15.2 oz)   Height: 6' 4" (1.93 m)     CONSTITUTIONAL: Vital signs noted. No apparent distress. Does not appear acutely " ill or septic. Appears adequately hydrated.  PULM: Breathing unlabored.  HEART: Regular.  DERM: Skin normothermic with normal turgor.  NEURO: There are no gross focal motor deficits or gross deficits of cranial nerves III-XII.  PSYCHIATRIC: Alert and oriented x 3. Mood is grossly neutral. Affect appropriate. Judgment and insight not grossly compromised.     PAST MEDICAL HISTORY, FAMILY HISTORY, SOCIAL HISTORY, CURRENT MEDICATION LIST, and ALLERGY LIST reviewed by me (ZORAIDA Baird MD) and are updated consistent with the patient's report.    ASSESSMENT and PLAN  Iron deficiency anemia, unspecified iron deficiency anemia type  -     Occult blood x 1, stool; Future; Expected date: 03/06/2018  -     CBC Without Differential; Future; Expected date: 09/06/2018    Risk for coronary artery disease between 10% and 20% in next 10 years  -     CBC Without Differential; Future; Expected date: 09/06/2018  -     simvastatin (ZOCOR) 20 MG tablet; Take 1 tablet (20 mg total) by mouth every evening. - for heart health  Dispense: 90 tablet; Refill: 3    Benign essential hypertension    Erectile dysfunction, unspecified erectile dysfunction type    Abnormal glucose        PRESCRIPTION MEDICATION MANAGEMENT  Medication List with Changes/Refills   New Medications    SIMVASTATIN (ZOCOR) 20 MG TABLET    Take 1 tablet (20 mg total) by mouth every evening. - for heart health   Current Medications    ACETAMINOPHEN (ARTHRITIS PAIN RELIEVER ORAL)    Take by mouth.    AMLODIPINE (NORVASC) 10 MG TABLET    Take 10 mg by mouth.    BAICALIN-CATECHIN (LIMBREL) 500 MG CAP    Take 500 mg by mouth 2 (two) times daily.    CELECOXIB (CELEBREX) 200 MG CAPSULE    Take 1 capsule (200 mg total) by mouth 2 (two) times daily as needed for Pain.    EFINACONAZOLE (JUBLIA) 10 % ANITHA    Apply 1 application topically once daily.    GABAPENTIN (NEURONTIN) 800 MG TABLET    Take 1 tablet (800 mg total) by mouth 3 (three) times daily.    HYDRALAZINE (APRESOLINE)  "100 MG TABLET        HYDROCODONE-ACETAMINOPHEN 5-325MG (NORCO) 5-325 MG PER TABLET    Take 1 tablet by mouth every 12 (twelve) hours as needed for Pain.    LISINOPRIL-HYDROCHLOROTHIAZIDE (PRINZIDE,ZESTORETIC) 20-12.5 MG PER TABLET        MV-MN/FA/LYCOPENE/LUT/HB#178 (HUBERT MULTIVITAMIN FOR MEN ORAL)    Take by mouth.    OXAPROZIN (DAYPRO) 600 MG TABLET        SILDENAFIL, ANTIHYPERTENSIVE, (REVATIO) 20 MG TAB        TIZANIDINE (ZANAFLEX) 4 MG TABLET    Take 1 tablet (4 mg total) by mouth 2 (two) times daily as needed.    TRIUMEQ 600- MG TAB       Discontinued Medications    PRAVASTATIN (PRAVACHOL) 40 MG TABLET    Take 40 mg by mouth every evening.     SILDENAFIL (VIAGRA) 100 MG TABLET    Take ONE-HALF tablet by mouth once daily as needed, 30 minutes before sexual activity       Follow-up for periodic management of chronic medical conditions.    ABOUT THIS DOCUMENTATION:  · The order of the conditions listed in the HPI is one of convenience and does not necessarily reflect the chronology of the appointment, nor the relative importance of a condition. It is possible that additional description or status details about condition(s) may be found elsewhere in the EHR documentation for today's encounter.  · Documentation entered by me for this encounter was done in part using speech-recognition technology. Although I have made an effort to ensure accuracy, "sound like" errors may exist and should be interpreted in context.                        -ZORAIDA Baird MD    Patient Instructions     Iron-Deficiency Anemia (Adult)  Red blood cells carry oxygen to the tissues of your body. Anemia is a condition in which you have too few red blood cells. You need iron to make red cells. Anemia makes you feel tired and run down. When anemia becomes severe, your skin becomes pale. You may feel short of breath after physical activity. Other symptoms include:  · Headaches  · Dizziness  · Leg cramps with physical " activity  · Drowsiness  · Restless legs  Your anemia is caused by not having enough iron in your body. This may be because of:  · Loss of blood. This can be caused by heavy menstrual periods. It can also be caused by bleeding from the stomach or intestines.  · Poor diet. You may not be eating enough foods that contain iron.  · Inability to absorb iron from the foods you eat  · Pregnancy  If your blood count is low enough, your healthcare provider may prescribe an iron supplement. It usually takes about 2 to 3 months of treatment with iron supplements to correct anemia. Severe cases of anemia need a blood transfusion to quickly ease symptoms and deliver more oxygen to the cells.  Home care  Follow these guidelines when caring for yourself at home:  · Eat foods high in iron. This will boost the amount of iron stored in your body. It is a natural way to build up the number of blood cells. Good sources of iron include beef, liver, spinach and other dark green leafy vegetables, whole grains, beans, and nuts.  · Do not overexert yourself.  · Talk with your healthcare provider before traveling by air or traveling to high altitudes.  Follow-up care  Follow up with your healthcare provider in 2 months, or as advised. This is to have another red blood cell count to be sure your anemia has been fixed.  When to seek medical advice  Call your healthcare provider right away if any of these occur:  · Shortness of breath or chest pain  · Dizziness or fainting  · Vomiting blood or passing red or black-colored stool   Date Last Reviewed: 2/25/2016  © 4665-9300 [employer]. [address] [place] [address], [place] [address]. All rights reserved. This information is not intended as a substitute for professional medical care. Always follow your healthcare professional's instructions.

## 2018-03-06 NOTE — PATIENT INSTRUCTIONS
Iron-Deficiency Anemia (Adult)  Red blood cells carry oxygen to the tissues of your body. Anemia is a condition in which you have too few red blood cells. You need iron to make red cells. Anemia makes you feel tired and run down. When anemia becomes severe, your skin becomes pale. You may feel short of breath after physical activity. Other symptoms include:  · Headaches  · Dizziness  · Leg cramps with physical activity  · Drowsiness  · Restless legs  Your anemia is caused by not having enough iron in your body. This may be because of:  · Loss of blood. This can be caused by heavy menstrual periods. It can also be caused by bleeding from the stomach or intestines.  · Poor diet. You may not be eating enough foods that contain iron.  · Inability to absorb iron from the foods you eat  · Pregnancy  If your blood count is low enough, your healthcare provider may prescribe an iron supplement. It usually takes about 2 to 3 months of treatment with iron supplements to correct anemia. Severe cases of anemia need a blood transfusion to quickly ease symptoms and deliver more oxygen to the cells.  Home care  Follow these guidelines when caring for yourself at home:  · Eat foods high in iron. This will boost the amount of iron stored in your body. It is a natural way to build up the number of blood cells. Good sources of iron include beef, liver, spinach and other dark green leafy vegetables, whole grains, beans, and nuts.  · Do not overexert yourself.  · Talk with your healthcare provider before traveling by air or traveling to high altitudes.  Follow-up care  Follow up with your healthcare provider in 2 months, or as advised. This is to have another red blood cell count to be sure your anemia has been fixed.  When to seek medical advice  Call your healthcare provider right away if any of these occur:  · Shortness of breath or chest pain  · Dizziness or fainting  · Vomiting blood or passing red or black-colored stool   Date  Last Reviewed: 2/25/2016  © 3454-8137 [employer]. [address] [place] [address], [place] [address]. All rights reserved. This information is not intended as a substitute for professional medical care. Always follow your healthcare professional's instructions.

## 2018-03-06 NOTE — Clinical Note
YOSSII: He is going to either bring his prescription bottles by at some point or send us photos of his prescription bottles so that we can ensure that our records accurately reflect his current medications. This needs to be done BEFORE I provide him refills.

## 2018-03-08 ENCOUNTER — LAB VISIT (OUTPATIENT)
Dept: LAB | Facility: HOSPITAL | Age: 48
End: 2018-03-08
Attending: FAMILY MEDICINE
Payer: COMMERCIAL

## 2018-03-08 DIAGNOSIS — D50.9 IRON DEFICIENCY ANEMIA, UNSPECIFIED IRON DEFICIENCY ANEMIA TYPE: ICD-10-CM

## 2018-03-08 PROCEDURE — 82272 OCCULT BLD FECES 1-3 TESTS: CPT

## 2018-03-08 RX ORDER — HYDROCODONE BITARTRATE AND ACETAMINOPHEN 5; 325 MG/1; MG/1
1 TABLET ORAL EVERY 12 HOURS PRN
Qty: 60 TABLET | Refills: 0 | Status: SHIPPED | OUTPATIENT
Start: 2018-03-08 | End: 2018-03-08 | Stop reason: SDUPTHER

## 2018-03-08 RX ORDER — HYDROCODONE BITARTRATE AND ACETAMINOPHEN 5; 325 MG/1; MG/1
1 TABLET ORAL EVERY 12 HOURS PRN
Qty: 60 TABLET | Refills: 0 | Status: SHIPPED | OUTPATIENT
Start: 2018-04-07 | End: 2018-05-01 | Stop reason: SDUPTHER

## 2018-03-09 ENCOUNTER — OFFICE VISIT (OUTPATIENT)
Dept: PAIN MEDICINE | Facility: CLINIC | Age: 48
End: 2018-03-09
Payer: COMMERCIAL

## 2018-03-09 VITALS
HEART RATE: 78 BPM | HEIGHT: 76 IN | BODY MASS INDEX: 29.47 KG/M2 | DIASTOLIC BLOOD PRESSURE: 89 MMHG | RESPIRATION RATE: 16 BRPM | WEIGHT: 242 LBS | SYSTOLIC BLOOD PRESSURE: 144 MMHG

## 2018-03-09 DIAGNOSIS — M54.16 LUMBAR RADICULOPATHY: ICD-10-CM

## 2018-03-09 DIAGNOSIS — M54.12 CERVICAL RADICULOPATHY: ICD-10-CM

## 2018-03-09 DIAGNOSIS — M51.36 DDD (DEGENERATIVE DISC DISEASE), LUMBAR: ICD-10-CM

## 2018-03-09 DIAGNOSIS — M54.2 NECK PAIN: ICD-10-CM

## 2018-03-09 DIAGNOSIS — M47.22 OSTEOARTHRITIS OF SPINE WITH RADICULOPATHY, CERVICAL REGION: Primary | ICD-10-CM

## 2018-03-09 DIAGNOSIS — M47.816 LUMBAR SPONDYLOSIS: ICD-10-CM

## 2018-03-09 DIAGNOSIS — M47.817 LUMBOSACRAL SPONDYLOSIS WITHOUT MYELOPATHY: ICD-10-CM

## 2018-03-09 LAB
OB PNL STL: NEGATIVE

## 2018-03-09 PROCEDURE — 99214 OFFICE O/P EST MOD 30 MIN: CPT | Mod: S$GLB,,, | Performed by: PHYSICIAN ASSISTANT

## 2018-03-09 PROCEDURE — 3077F SYST BP >= 140 MM HG: CPT | Mod: S$GLB,,, | Performed by: PHYSICIAN ASSISTANT

## 2018-03-09 PROCEDURE — 3079F DIAST BP 80-89 MM HG: CPT | Mod: S$GLB,,, | Performed by: PHYSICIAN ASSISTANT

## 2018-03-09 PROCEDURE — 99999 PR PBB SHADOW E&M-EST. PATIENT-LVL III: CPT | Mod: PBBFAC,,, | Performed by: PHYSICIAN ASSISTANT

## 2018-03-09 NOTE — PROGRESS NOTES
Chief Pain Complaint:  Low-back Pain    Interval History: Patient was last seen on 2/6/2018. At that visit the plan was to continue medications and conservative treatment. His neck and arm pain has worsened, and he would like to move forward with an injection.    History of Present Illness:   Cornel Dick is a 47 y.o. male  who is presenting with a chief complaint of low back pain for >3 years. He has been bothered more with neck pain recently. The patient began experiencing this problem insidiously, and the pain has been gradually worsening over the past 5 week(s). The pain is described as throbbing, shooting, burning and electrical and is located in the right cervial spine. Pain is intermittent and lasts hours. The pain radiates to right arm. The patient rates his pain a 8 out of ten and interferes with activities of daily living a 8 out of ten. Pain is exacerbated by flexion of the cervial spine, and is improved by rest. Patient reports no prior trauma, prior lumbar surgery.     - pertinent negatives: No fever, No chills, No weight loss, No bladder dysfunction, No bowel dysfunction, No saddle anesthesia  - pertinent positives: generalized nonspecific Lower Extremity weakness bilaterally    - medications, other therapies tried (physical therapy, injections):     >> gabapentin, zanaflex, Tramadol, norco    >> Has previously undergone Physical Therapy    >> Has previously undergone spinal injection/s:    - Right L3/L4, L5/S1 TF KAVITA on 1-13-17 and 3-10-17 with only weeks of relief after these injections   - [place] Sci SCS trial on 5-3-17 followed by permanent implantation at [facility] [place]      Imaging / Labs / Studies (reviewed on 3/9/2018):    2/12/18 CT Cervical Spine Without Contrast  Narrative: CT of c-spine without IV contrast  History: Cervicalgia  Technique: Standard cervical spine CT protocol was performed without IV contrast.  Finding: There is grade 1 retrolisthesis C5 on C6. There is no fracture  or scoliosis. There is straightening of the normal cervical lordosis. There is mild concentric bulging of the intervertebral disc between C4 and C5.  Impression:   1. There is grade 1 retrolisthesis C5 on C6.   2. There is straightening of the normal cervical lordosis.   3. There is mild concentric bulging of the intervertebral disc between C4 and C5.          11/16/16 MRI Lumbar Spine Without Contrast     Narrative Technique:  Multiplanar, multisequence MR images were performed of the lumbar spine obtained without contrast.   Comparison: None.   Results:  Lumbar spine alignment is within normal limits. The vertebral body heights are well maintained, with no fracture.  No marrow signal abnormality suspicious for an infiltrative process.    The conus medullaris terminates at approximately the L1-L2 disk space.  The adjacent soft tissue structures show no significant abnormalities.  There is mild disc desiccation at the L3-L4 and L5-S1 discs.  Minimal disc space narrowing noted at these levels.  L1-L2: No significant central canal or neural foraminal narrowing.  L2-L3: No significant central canal or neural foraminal narrowing.  L3-L4: There is a broad-based right foraminal disc extrusion that results in moderate effacement of the exiting L3 nerve root.  No significant central canal narrowing.  L4-L5:  No significant central canal or neural foraminal narrowing.  L5-S1:  Broad-based right paracentral/right foraminal disc protrusion resulting in no significant central canal narrowing.  The right neural foraminal canal is mildly to moderately narrowed.  There is abutment of the exiting L5 nerve root.  No significant effacement of this nerve root.           5/13/16 X-Ray Lumbar Spine Complete 5 View     Narrative Five view lumbar spine.  Findings: Spinal alignment is anatomic.  Mild disc space narrowing and facet arthropathy at L5-S1.  Pedicles appear intact.  No compression fracture or subluxation.         Review of  "Systems:  CONSTITUTIONAL: patient denies any fever, chills, or weight loss  SKIN: patient denies any rash or itching  RESPIRATORY: patient denies having any shortness of breath  GASTROINTESTINAL: patient denies having any diarrhea, constipation, or bowel incontinence  GENITOURINARY: patient denies having any abnormal bladder function    MUSCULOSKELETAL:  - patient complains of the above noted pain/s (see chief pain complaint)    NEUROLOGICAL:   - pain as above  - strength in Upper extremities is intact, BILATERALLY  - sensation in Upper extremities is intact, BILATERALLY  - patient denies any loss of bowel or bladder control      PSYCHIATRIC: patient denies any change in mood    Other:  All other systems reviewed and are negative      Physical Exam:  Vitals:  BP (!) 144/89 (BP Location: Right arm, Patient Position: Sitting, BP Method: Medium (Automatic))   Pulse 78   Resp 16   Ht 6' 4" (1.93 m)   Wt 109.8 kg (242 lb)   BMI 29.46 kg/m²   (reviewed on 3/9/2018)    General: alert and oriented, in no apparent distress.  Gait: normal gait.  Skin: no rashes, no discoloration, no obvious lesions  HEENT: normocephalic, atraumatic. Pupils equal and round.  Cardiovascular: no significant peripheral edema present.  Respiratory: without use of accessory muscles of respiration.    Musculoskeletal - Lumbar Spine:  - Pain on flexion of lumbar spine: Absent   - Pain on extension of lumbar spine: Absent   - Lumbar facet loading: Absent   - TTP over the lumbar facet joints: Absent  - TTP over the SI joints:  Absent   - Straight Leg Raise: Negative  - MAEVE: Negative    Musculoskeletal - Cervical Spine:  - Pain on flexion of cervical spine: Present  - Pain on extension of cervical spine: Absent   - Cervical facet loading: Absent   - TTP over the cervical facet joints: Absent  - Spurling's: Positive on right    Neuro - Extremities:  - BUE Strength:R/L: D: 5/5; B: 5/5; T: 5/5; WF: 5/5; WE: 5/5; IO: 5/5  - BLE Strength: R/L: HF: " "5/5, HE: 5/5, KF: 5/5; KE: 5/5; FE: 5/5; FF: 5/5  - Extremity Reflexes: Brisk and symmetric throughout  - Sensory: Sensation to light touch intact bilaterally    Psych:  Mood and affect is appropriate        Assessment:  Cornel Dick is a 47 y.o. year old male who is presenting with     Encounter Diagnoses   Name Primary?    Osteoarthritis of spine with radiculopathy, cervical region Yes    Lumbar spondylosis     Lumbar radiculopathy     DDD (degenerative disc disease), lumbar     Neck pain     Lumbosacral spondylosis without myelopathy       Patient returns for follow-up. He has bilateral leg pain (R>L) across dermatomes, primarily extending along the posterior aspects of the legs. He has low back pain as well.  MRI shows DDD at L3/4 and L5/S1 with right side extension and encroachment of nerve roots, NF narrowing, and facet disease as well. There seems to be no indication for left side radicular pain based on the MRI. He has been seen by Neurosurgery at the [facility], who recommended spinal injections (records attached into "Media"). He ultimately had a spinal cord stimulator trial on 5-3-17 with [employer] dual 16 contact percutaneous leads.  He went on to have the SCS permanently implanted at the [facility]. This has provided a lot of relief of his lower extremity pain.        Plan:  1. Interventional:   - Schedule Right C6-7 TFESI if conservative measures fail. Patient is not prescription taking blood thinners or ASA.     2. Pharmacologic:   - Refill Norco 5/325 mg Po BID PRN (60 tabs) x 2 months.  - Continue gabapentin 800 mg PO TID and Tizanidine 4 mg Po BID PRN.   - Opioid contract signed on 2-6-18.     - LA  reviewed and appropriate. Last fill date showing up  is 11-16-17 from Dr. Puente.      3. Rehabilitative: Encouraged regular exercise. Consider PT.    4. Diagnostic: Cervical CT reviewed, detailed above.    5. Follow up: PRN    - I discussed the risks, benefits, and " alternatives to potential treatment options. All questions and concerns were fully addressed today in clinic. Dr. Benton was consulted regarding the patient plan and agrees.             >>Opioid Risk Tool:  12/20/2016 :: 1    >>UDS:  12/20/2016 :: negative (at initial consult)  8/14/2017 :: negative     >> Pain Disability Index:  2/6/2017 :: 39  3/20/2017 :: 43  4/6/2017 :: 46  5/8/2017 :: 35  7/17/2017 :: 50  8/14/2017 :: 55  10/16/2017 :: 29

## 2018-03-09 NOTE — TELEPHONE ENCOUNTER
Patient's prescriptions were printed and signed by Dr. Benton prior to the patient's clinic appointment.

## 2018-03-12 RX ORDER — TIZANIDINE 4 MG/1
TABLET ORAL
Qty: 60 TABLET | Refills: 0 | Status: SHIPPED | OUTPATIENT
Start: 2018-03-12

## 2018-03-12 NOTE — PROGRESS NOTES
"TASKS:  (1) Set a "Remind Me" reminder in Epic to verify in 1 week that he has read the patient portal message I sent with my interpretation of his test results.  (2) If he has not read the message by then, call him to share my message with him by phone.  (3) If you can't reach him by phone (personally, not voice-mail) after trying for 2 business days, then copy-and-past my message in a letter (correspondence) to him using the "[email or web address]" Smart Phrase, and print and mail the letter to him.    Thanks!      ZORAIDA Baird MD  --------------------------------------------------------------------------------   PATIENT CONTACT INFORMATION  Home Phone      [phone]  Work Phone      Not on file.  [employer]          [phone]    --------------------------------------------------------------------------------   Will's future appointments include:  3/16/2018  9:30 AM    [facility] PAIN1                 [facility] PAINMGT   Kathryn Richmond  3/23/2018  9:00 AM    ELBA MILLER-DR ELBA Carmona  3/23/2018  9:20 AM    Ford Alcantara MD     [facility]     [facility]   5/4/2018   10:20 AM   Magda Bynum PA-C   [facility]      [facility]  --------------------------------------------------------------------------------  Hi, Will.    I am happy to report that all 3 of your tests for blood in your stools were NEGATIVE (NORMAL).    To learn more about your test results and what they mean, click on the "About this test" link from your [facility] account ([email or web address]) or from your [employer] smartphone [place]. Also, we can discuss your results further when you return for your next appointment.    Thank you for letting me care for you. I look forward to seeing you again. Let me know if you have any questions in the meantime.    Wishing you health and happiness,    ZORAIDA Baird MD  ----------------------------------------------------------------  UPCOMING " APPOINTMENTS  ----------------------------------------------------------------  Your future appointments include:  3/16/2018  9:30 AM    [facility] PAIN[facility]                 [facility]IN[facility]GT   Kathryn Richmond  3/23/2018  9:00 AM    ELBA MILLER-DR ELBA Carmona  3/23/2018  9:20 AM    Ford Alcantara MD     [facility]     [facility]  5/4/2018   10:20 AM   Magda Bynum PA-C   [facility]      [facility]    ----------------------------------------------------------------  TO SCHEDULE OR CHANGE AN APPOINTMENT  ----------------------------------------------------------------  *Login to your [facility] account at [email or web address]  *Use the [facility] [place] on your mobile device   or  *Call our appointment desk at [phone].    ----------------------------------------------------------------  ADDITIONAL IMPORTANT INFORMATION  ----------------------------------------------------------------  *[facility] is NOT to be used for urgent needs.  *Although we try to respond to messages within 2 business days, a response can take longer, depending on circumstances.  *For medical emergencies, dial 911.    You can get help with [facility] and [facility] by email at  [email or web address] or by phone at [phone]. The [facility] Patient Support Team is available Monday through Friday from 9 a.m. until 5 p.m.  (After hours, you can leave a message requesting assistance.)

## 2018-03-16 ENCOUNTER — SURGERY (OUTPATIENT)
Age: 48
End: 2018-03-16

## 2018-03-16 ENCOUNTER — HOSPITAL ENCOUNTER (OUTPATIENT)
Dept: RADIOLOGY | Facility: HOSPITAL | Age: 48
Discharge: HOME | End: 2018-03-16
Attending: PHYSICIAN ASSISTANT
Payer: COMMERCIAL

## 2018-03-16 ENCOUNTER — HOSPITAL ENCOUNTER (OUTPATIENT)
Facility: HOSPITAL | Age: 48
Discharge: HOME | End: 2018-03-16
Attending: ANESTHESIOLOGY | Admitting: ANESTHESIOLOGY
Payer: COMMERCIAL

## 2018-03-16 VITALS
DIASTOLIC BLOOD PRESSURE: 73 MMHG | HEART RATE: 86 BPM | SYSTOLIC BLOOD PRESSURE: 130 MMHG | RESPIRATION RATE: 16 BRPM | OXYGEN SATURATION: 100 % | TEMPERATURE: 98 F

## 2018-03-16 DIAGNOSIS — M54.12 CERVICAL RADICULOPATHY: ICD-10-CM

## 2018-03-16 PROCEDURE — 25000003 PHARM REV CODE 250: Performed by: ANESTHESIOLOGY

## 2018-03-16 PROCEDURE — 25000003 PHARM REV CODE 250

## 2018-03-16 PROCEDURE — 63600175 PHARM REV CODE 636 W HCPCS

## 2018-03-16 PROCEDURE — 99152 MOD SED SAME PHYS/QHP 5/>YRS: CPT | Mod: ,,, | Performed by: ANESTHESIOLOGY

## 2018-03-16 PROCEDURE — 64479 NJX AA&/STRD TFRM EPI C/T 1: CPT | Mod: RT,,, | Performed by: ANESTHESIOLOGY

## 2018-03-16 PROCEDURE — 64479 NJX AA&/STRD TFRM EPI C/T 1: CPT

## 2018-03-16 PROCEDURE — 63600175 PHARM REV CODE 636 W HCPCS: Performed by: ANESTHESIOLOGY

## 2018-03-16 RX ORDER — SODIUM CHLORIDE, SODIUM LACTATE, POTASSIUM CHLORIDE, CALCIUM CHLORIDE 600; 310; 30; 20 MG/100ML; MG/100ML; MG/100ML; MG/100ML
INJECTION, SOLUTION INTRAVENOUS CONTINUOUS
Status: DISCONTINUED | OUTPATIENT
Start: 2018-03-16 | End: 2018-03-16 | Stop reason: HOSPADM

## 2018-03-16 RX ORDER — LIDOCAINE HYDROCHLORIDE 20 MG/ML
INJECTION, SOLUTION EPIDURAL; INFILTRATION; INTRACAUDAL; PERINEURAL
Status: DISCONTINUED | OUTPATIENT
Start: 2018-03-16 | End: 2018-03-16 | Stop reason: HOSPADM

## 2018-03-16 RX ORDER — SODIUM CHLORIDE, SODIUM LACTATE, POTASSIUM CHLORIDE, CALCIUM CHLORIDE 600; 310; 30; 20 MG/100ML; MG/100ML; MG/100ML; MG/100ML
INJECTION, SOLUTION INTRAVENOUS CONTINUOUS
Status: CANCELLED | OUTPATIENT
Start: 2018-03-16

## 2018-03-16 RX ORDER — MIDAZOLAM HYDROCHLORIDE 1 MG/ML
INJECTION, SOLUTION INTRAMUSCULAR; INTRAVENOUS
Status: DISCONTINUED | OUTPATIENT
Start: 2018-03-16 | End: 2018-03-16 | Stop reason: HOSPADM

## 2018-03-16 RX ORDER — DEXAMETHASONE SODIUM PHOSPHATE 10 MG/ML
INJECTION INTRAMUSCULAR; INTRAVENOUS
Status: DISCONTINUED | OUTPATIENT
Start: 2018-03-16 | End: 2018-03-16 | Stop reason: HOSPADM

## 2018-03-16 RX ORDER — FENTANYL CITRATE 50 UG/ML
INJECTION, SOLUTION INTRAMUSCULAR; INTRAVENOUS
Status: DISCONTINUED | OUTPATIENT
Start: 2018-03-16 | End: 2018-03-16 | Stop reason: HOSPADM

## 2018-03-16 RX ADMIN — MIDAZOLAM HYDROCHLORIDE 2 MG: 1 INJECTION, SOLUTION INTRAMUSCULAR; INTRAVENOUS at 09:03

## 2018-03-16 RX ADMIN — FENTANYL CITRATE 50 MCG: 50 INJECTION, SOLUTION INTRAMUSCULAR; INTRAVENOUS at 09:03

## 2018-03-16 RX ADMIN — LIDOCAINE HYDROCHLORIDE 1 ML: 20 INJECTION, SOLUTION EPIDURAL; INFILTRATION; INTRACAUDAL; PERINEURAL at 09:03

## 2018-03-16 RX ADMIN — DEXAMETHASONE SODIUM PHOSPHATE 10 MG: 10 INJECTION, SOLUTION INTRAMUSCULAR; INTRAVENOUS at 09:03

## 2018-03-16 NOTE — DISCHARGE SUMMARY
[facility]  Discharge Note       Description of Procedure:  under Fluoroscopic Guidance    Procedure Date: 3/16/2018    Admit Date: 3/16/2018  Discharge Date: 3/16/2018     Attending Physician: Chetan Salas   Discharge Provider: Chetan Salas    Preoperative Diagnosis: Cervical Spondylosis, Cervical Radiculopathy     Postoperative Diagnosis: as above, same as preoperative diagnosis    Discharged Condition: Stable    Hospital Course: Patient was admitted for an outpatient procedure. The procedure was tolerated well with no complications.    Final Diagnoses: Same as principal problem.    Disposition: Home, self-care.    Follow up/Patient Instructions:  Follow-up in clinic in 2-3 weeks.    Medications: No medications were prescribed today. The patient was advised to resume normal medication regimen without change.  Specific information was provided regarding restarting any anticoagulant/s.    Discharge Procedure Orders (must include Diet, Follow-up, Activity):  Light activity for the remainder of the day, resume normal activity tomorrow. Resume normal diet. Follow-up in clinic in 2-3 weeks.

## 2018-03-16 NOTE — OP NOTE
Procedure: Cervical Transforaminal Epidural Steroid Injection under Fluoroscopic Guidance     Level: C6/7      Side: Right          PROCEDURE DATE: 03/16/18     Pre-operative Diagnosis: Cervical Radiculopathy  Post-operative Diagnosis: Cervical Radiculopathy     Provider: Josue Benton MD  Assistant(s): None     Anesthesia:  Sedation    >> 2 mg of VERSED    >> 50 mcg of FENTANYL      Indication: Neck pain with radiculopathy consistent with distribution of targeted nerve. Symptoms unresponsive to conservative treatments. Fluoroscopy was used to optimize visualization of needle placement and to maximize safety.      Procedure Description / Technique:  The patient was seen and identified in the preoperative area. Risks, benefits, complications, and alternatives were discussed with the patient. The patient agreed to proceed with the procedure and signed the consent. The site and side of the procedure was identified and marked. An IV was started. The patient was taken to the procedural suite.     The patient was positioned in supine orientation on procedure table. A time out was performed prior to any intervention. The procedure, site, side, and allergies were stated and agreed to by all present. The neck area was widely prepped with ChloraPrep. The procedural site was draped in usual sterile fashion. Vital signs were closely monitored throughout this procedure. Conscious sedation was used for this procedure.     The Right  C6-C7 foramen was identified. The midpoint of the posterior aspect of the facets were approached using 25-gauge, 2.5-inch Quincke point needle. On AP view the needle tip was in the lateral third of the articular pillars. After negative aspiration, 0.5 mL of omnipaque was injected. Good spread was identified. Then, 10 mg of Decadron (dexamethasone) and 0.5 mL of 1% Lidocaine was injected. The needle was removed intact.     Description of Findings: Not applicable     Prosthetic devices, grafts, tissues,  or devices implanted: None     Specimen Removed: No     Estimated Blood Loss: minimal     COMPLICATIONS: None

## 2018-03-16 NOTE — PLAN OF CARE
Problem: Pain, Chronic (Adult)  Goal: Acceptable Pain Control/Comfort Level  Patient will demonstrate the desired outcomes by discharge/transition of care.   Pt dc'd home in stable condition with ride via wc. Pt  verbalized understanding of dc instructions. NAD noted in pt at this time. Pt stood at bs w/o assistance, no weakness or deficits noted. No new motor or sensory deficits noted that were not present prior to arrival.  Pt voices no new complaints.

## 2018-03-16 NOTE — H&P (VIEW-ONLY)
Chief Pain Complaint:  Low-back Pain    Interval History: Patient was last seen on 2/6/2018. At that visit the plan was to continue medications and conservative treatment. His neck and arm pain has worsened, and he would like to move forward with an injection.    History of Present Illness:   Cornel Dick is a 47 y.o. male  who is presenting with a chief complaint of low back pain for >3 years. He has been bothered more with neck pain recently. The patient began experiencing this problem insidiously, and the pain has been gradually worsening over the past 5 week(s). The pain is described as throbbing, shooting, burning and electrical and is located in the right cervial spine. Pain is intermittent and lasts hours. The pain radiates to right arm. The patient rates his pain a 8 out of ten and interferes with activities of daily living a 8 out of ten. Pain is exacerbated by flexion of the cervial spine, and is improved by rest. Patient reports no prior trauma, prior lumbar surgery.     - pertinent negatives: No fever, No chills, No weight loss, No bladder dysfunction, No bowel dysfunction, No saddle anesthesia  - pertinent positives: generalized nonspecific Lower Extremity weakness bilaterally    - medications, other therapies tried (physical therapy, injections):     >> gabapentin, zanaflex, Tramadol, norco    >> Has previously undergone Physical Therapy    >> Has previously undergone spinal injection/s:    - Right L3/L4, L5/S1 TF KAVITA on 1-13-17 and 3-10-17 with only weeks of relief after these injections   - [place] Sci SCS trial on 5-3-17 followed by permanent implantation at [facility] thereafter      Imaging / Labs / Studies (reviewed on 3/9/2018):    2/12/18 CT Cervical Spine Without Contrast  Narrative: CT of c-spine without IV contrast  History: Cervicalgia  Technique: Standard cervical spine CT protocol was performed without IV contrast.  Finding: There is grade 1 retrolisthesis C5 on C6. There is no fracture  or scoliosis. There is straightening of the normal cervical lordosis. There is mild concentric bulging of the intervertebral disc between C4 and C5.  Impression:   1. There is grade 1 retrolisthesis C5 on C6.   2. There is straightening of the normal cervical lordosis.   3. There is mild concentric bulging of the intervertebral disc between C4 and C5.          11/16/16 MRI Lumbar Spine Without Contrast     Narrative Technique:  Multiplanar, multisequence MR images were performed of the lumbar spine obtained without contrast.   Comparison: None.   Results:  Lumbar spine alignment is within normal limits. The vertebral body heights are well maintained, with no fracture.  No marrow signal abnormality suspicious for an infiltrative process.    The conus medullaris terminates at approximately the L1-L2 disk space.  The adjacent soft tissue structures show no significant abnormalities.  There is mild disc desiccation at the L3-L4 and L5-S1 discs.  Minimal disc space narrowing noted at these levels.  L1-L2: No significant central canal or neural foraminal narrowing.  L2-L3: No significant central canal or neural foraminal narrowing.  L3-L4: There is a broad-based right foraminal disc extrusion that results in moderate effacement of the exiting L3 nerve root.  No significant central canal narrowing.  L4-L5:  No significant central canal or neural foraminal narrowing.  L5-S1:  Broad-based right paracentral/right foraminal disc protrusion resulting in no significant central canal narrowing.  The right neural foraminal canal is mildly to moderately narrowed.  There is abutment of the exiting L5 nerve root.  No significant effacement of this nerve root.           5/13/16 X-Ray Lumbar Spine Complete 5 View     Narrative Five view lumbar spine.  Findings: Spinal alignment is anatomic.  Mild disc space narrowing and facet arthropathy at L5-S1.  Pedicles appear intact.  No compression fracture or subluxation.         Review of  "Systems:  CONSTITUTIONAL: patient denies any fever, chills, or weight loss  SKIN: patient denies any rash or itching  RESPIRATORY: patient denies having any shortness of breath  GASTROINTESTINAL: patient denies having any diarrhea, constipation, or bowel incontinence  GENITOURINARY: patient denies having any abnormal bladder function    MUSCULOSKELETAL:  - patient complains of the above noted pain/s (see chief pain complaint)    NEUROLOGICAL:   - pain as above  - strength in Upper extremities is intact, BILATERALLY  - sensation in Upper extremities is intact, BILATERALLY  - patient denies any loss of bowel or bladder control      PSYCHIATRIC: patient denies any change in mood    Other:  All other systems reviewed and are negative      Physical Exam:  Vitals:  BP (!) 144/89 (BP Location: Right arm, Patient Position: Sitting, BP Method: Medium (Automatic))   Pulse 78   Resp 16   Ht 6' 4" (1.93 m)   Wt 109.8 kg (242 lb)   BMI 29.46 kg/m²   (reviewed on 3/9/2018)    General: alert and oriented, in no apparent distress.  Gait: normal gait.  Skin: no rashes, no discoloration, no obvious lesions  HEENT: normocephalic, atraumatic. Pupils equal and round.  Cardiovascular: no significant peripheral edema present.  Respiratory: without use of accessory muscles of respiration.    Musculoskeletal - Lumbar Spine:  - Pain on flexion of lumbar spine: Absent   - Pain on extension of lumbar spine: Absent   - Lumbar facet loading: Absent   - TTP over the lumbar facet joints: Absent  - TTP over the SI joints:  Absent   - Straight Leg Raise: Negative  - MAEVE: Negative    Musculoskeletal - Cervical Spine:  - Pain on flexion of cervical spine: Present  - Pain on extension of cervical spine: Absent   - Cervical facet loading: Absent   - TTP over the cervical facet joints: Absent  - Spurling's: Positive on right    Neuro - Extremities:  - BUE Strength:R/L: D: 5/5; B: 5/5; T: 5/5; WF: 5/5; WE: 5/5; IO: 5/5  - BLE Strength: R/L: HF: " "5/5, HE: 5/5, KF: 5/5; KE: 5/5; FE: 5/5; FF: 5/5  - Extremity Reflexes: Brisk and symmetric throughout  - Sensory: Sensation to light touch intact bilaterally    Psych:  Mood and affect is appropriate        Assessment:  Cornel Dick is a 47 y.o. year old male who is presenting with     Encounter Diagnoses   Name Primary?    Osteoarthritis of spine with radiculopathy, cervical region Yes    Lumbar spondylosis     Lumbar radiculopathy     DDD (degenerative disc disease), lumbar     Neck pain     Lumbosacral spondylosis without myelopathy       Patient returns for follow-up. He has bilateral leg pain (R>L) across dermatomes, primarily extending along the posterior aspects of the legs. He has low back pain as well.  MRI shows DDD at L3/4 and L5/S1 with right side extension and encroachment of nerve roots, NF narrowing, and facet disease as well. There seems to be no indication for left side radicular pain based on the MRI. He has been [place] [facility] injections (records attached into "Media"). He ultimately had a spinal cord stimulator trial on 5-3-17 with [employer] dual 16 contact percutaneous leads.  He went on to have the SCS permanently implanted at the [facility]. This has provided a lot of relief of his lower extremity pain.        Plan:  1. Interventional:   - Schedule Right C6-7 TFESI if conservative measures fail. Patient is not prescription taking blood thinners or ASA.     2. Pharmacologic:   - Refill Norco 5/325 mg Po BID PRN (60 tabs) x 2 months.  - Continue gabapentin 800 mg PO TID and Tizanidine 4 mg Po BID PRN.   - Opioid contract signed on 2-6-18.     - LA  reviewed and appropriate. Last fill date showing up  is 11-16-17 from Dr. Puente.      3. Rehabilitative: Encouraged regular exercise. Consider PT.    4. Diagnostic: Cervical CT reviewed, detailed above.    5. Follow up: PRN    - I discussed the risks, benefits, and " alternatives to potential treatment options. All questions and concerns were fully addressed today in clinic. Dr. Benton was consulted regarding the patient plan and agrees.             >>Opioid Risk Tool:  12/20/2016 :: 1    >>UDS:  12/20/2016 :: negative (at initial consult)  8/14/2017 :: negative     >> Pain Disability Index:  2/6/2017 :: 39  3/20/2017 :: 43  4/6/2017 :: 46  5/8/2017 :: 35  7/17/2017 :: 50  8/14/2017 :: 55  10/16/2017 :: 29

## 2018-03-16 NOTE — INTERVAL H&P NOTE
The patient has been examined and the H&P has been reviewed:    I concur with the findings and no changes have occurred since H&P was written.

## 2018-03-23 ENCOUNTER — HOSPITAL ENCOUNTER (OUTPATIENT)
Dept: RADIOLOGY | Facility: HOSPITAL | Age: 48
Discharge: HOME | End: 2018-03-23
Attending: ORTHOPAEDIC SURGERY
Payer: COMMERCIAL

## 2018-03-23 ENCOUNTER — OFFICE VISIT (OUTPATIENT)
Dept: ORTHOPEDICS | Facility: CLINIC | Age: 48
End: 2018-03-23
Payer: COMMERCIAL

## 2018-03-23 VITALS
DIASTOLIC BLOOD PRESSURE: 65 MMHG | HEIGHT: 76 IN | WEIGHT: 242 LBS | SYSTOLIC BLOOD PRESSURE: 113 MMHG | HEART RATE: 58 BPM | BODY MASS INDEX: 29.47 KG/M2

## 2018-03-23 DIAGNOSIS — M54.12 CERVICAL RADICULOPATHY: ICD-10-CM

## 2018-03-23 DIAGNOSIS — M25.521 RIGHT ELBOW PAIN: ICD-10-CM

## 2018-03-23 DIAGNOSIS — M17.12 PRIMARY OSTEOARTHRITIS OF LEFT KNEE: Primary | ICD-10-CM

## 2018-03-23 PROBLEM — M77.11 LATERAL EPICONDYLITIS OF RIGHT ELBOW: Status: ACTIVE | Noted: 2018-03-23

## 2018-03-23 PROCEDURE — 3074F SYST BP LT 130 MM HG: CPT | Mod: CPTII,S$GLB,, | Performed by: ORTHOPAEDIC SURGERY

## 2018-03-23 PROCEDURE — 73080 X-RAY EXAM OF ELBOW: CPT | Mod: 26,RT,, | Performed by: RADIOLOGY

## 2018-03-23 PROCEDURE — 73080 X-RAY EXAM OF ELBOW: CPT | Mod: TC,RT

## 2018-03-23 PROCEDURE — 99999 PR PBB SHADOW E&M-EST. PATIENT-LVL III: CPT | Mod: PBBFAC,,, | Performed by: ORTHOPAEDIC SURGERY

## 2018-03-23 PROCEDURE — 3078F DIAST BP <80 MM HG: CPT | Mod: CPTII,S$GLB,, | Performed by: ORTHOPAEDIC SURGERY

## 2018-03-23 PROCEDURE — 99213 OFFICE O/P EST LOW 20 MIN: CPT | Mod: S$GLB,,, | Performed by: ORTHOPAEDIC SURGERY

## 2018-03-23 NOTE — PROGRESS NOTES
"Subjective:     Patient ID: Cornel Dick is a 47 y.o. male.    Chief Complaint: Pain of the Left Knee    Patient presents to clinic for a f/u appt for L knee pain and R elbow pain. Since his last visit, pt was seen at [facility] and had his L knee medial  brace adjusted, he reports this has helped with decreased pain and swelling in his L knee. He does continue to swell throughout the day in L knee or with prolonged walking/standing. He ambulates with a single pt cane. Denies any recent falls. He also c/o R elbow pain x 1 mo. He has a hx of cervical radiculopathy in RUE and received an injection per Dr. Benton on 03/16/2018, pt reports he has not felt a difference yet in RUE numbness/tingling/shooting pains. He reports weakness in RUE, he is L handed. Pain is exacerbated with lifting objects at or over 10 lbs. He c/o persistent neck pain with RUE sharp, shooting pains and N/T. Denies trauma.     Previous: History of reevaluation of left knee pain.  He had surgery scheduled with Dr. Ibarra in January, but scheduling conflict came up and I was asked if I could see him today.  He has had many years of left knee pain.  This pain is worsening with time.  He has tried extensive nonoperative treatment including injections including corticosteroid and hyaluronic acid injections, but these only gave him 1-2 weeks of relief.  He is also been using a medial  brace but this is been since he doesn't 13 and he feels the brace may be losing its effectiveness getting "worn out."  Note he also uses an ankle brace on the left side due to ankle pain.  He has tried physical therapy for at least 6 months in the past.  He takes Celebrex daily.  Despite all of these treatments, he continues to have medial sided knee pain also anterior knee pain.  This interferes with his daily activities.  Worse with standing and prolonged ambulation.  He uses a cane.  He did have arthroscopy of this left knee in 2012 " "with Dr. Ibarra.  He believes he had an injury playing sports when he was 18, he said he had surgery for this and that he "broke the bone in 3 places."  But he is not able to get much more information than this.        Knee Pain    The pain is present in the left knee. The pain radiates to the left foot, groin and left thigh. This is a chronic problem. The current episode started more than 1 year ago. There has been no history of extremity trauma. Movement associated with injury: no injury.The problem occurs constantly. The problem has been unchanged. The quality of the pain is described as aching, sharp and shooting. The pain is at a severity of 6/10. Associated symptoms include numbness and stiffness. Pertinent negatives include no fever or itching. The symptoms are aggravated by activity and bearing weight. He has tried injection treatment and oral narcotics (Left Knee ATS) for the symptoms. The treatment provided mild relief. Physical therapy was effective.      Past Medical History:   Diagnosis Date    Anemia 2/21/2018    Arthritis     Benign essential hypertension 2/21/2018    Drug-induced erectile dysfunction 2/21/2018    HBP (high blood pressure)     Renal insufficiency 2/21/2018     Past Surgical History:   Procedure Laterality Date    back stimulator      KNEE ARTHROSCOPY Left      Family History   Problem Relation Age of Onset    Heart disease Father      Social History     Social History    Marital status:      Spouse name: N/A    Number of children: N/A    Years of education: N/A     Occupational History    Not on file.     Social History Main Topics    Smoking status: Never Smoker    Smokeless tobacco: Never Used    Alcohol use No    Drug use: No    Sexual activity: Yes     Partners: Female     Other Topics Concern    Not on file     Social History Narrative    No narrative on file     Medication List with Changes/Refills   Current Medications    ACETAMINOPHEN (ARTHRITIS PAIN " RELIEVER ORAL)    Take by mouth.    AMLODIPINE (NORVASC) 10 MG TABLET    Take 10 mg by mouth.    BAICALIN-CATECHIN (LIMBREL) 500 MG CAP    Take 500 mg by mouth 2 (two) times daily.    CELECOXIB (CELEBREX) 200 MG CAPSULE    Take 1 capsule (200 mg total) by mouth 2 (two) times daily as needed for Pain.    EFINACONAZOLE (JUBLIA) 10 % ANITHA    Apply 1 application topically once daily.    GABAPENTIN (NEURONTIN) 800 MG TABLET    Take 1 tablet (800 mg total) by mouth 3 (three) times daily.    HYDRALAZINE (APRESOLINE) 100 MG TABLET        HYDROCODONE-ACETAMINOPHEN 5-325MG (NORCO) 5-325 MG PER TABLET    Take 1 tablet by mouth every 12 (twelve) hours as needed for Pain.    LISINOPRIL-HYDROCHLOROTHIAZIDE (PRINZIDE,ZESTORETIC) 20-12.5 MG PER TABLET        MV-MN/FA/LYCOPENE/LUT/HB#178 (HUBERT MULTIVITAMIN FOR MEN ORAL)    Take by mouth.    OXAPROZIN (DAYPRO) 600 MG TABLET        SILDENAFIL, ANTIHYPERTENSIVE, (REVATIO) 20 MG TAB        SIMVASTATIN (ZOCOR) 20 MG TABLET    Take 1 tablet (20 mg total) by mouth every evening. - for heart health    TIZANIDINE (ZANAFLEX) 4 MG TABLET    TAKE ONE TABLET BY MOUTH TWICE DAILY AS NEEDED    TRIUMEQ 600- MG TAB         Review of patient's allergies indicates:   Allergen Reactions    Chocolate flavor Swelling     Review of Systems   Constitution: Negative for fever.   HENT: Negative for sore throat.    Eyes: Negative for blurred vision.   Cardiovascular: Negative for dyspnea on exertion.   Respiratory: Negative for shortness of breath.    Hematologic/Lymphatic: Does not bruise/bleed easily.   Skin: Negative for itching.   Musculoskeletal: Positive for arthritis, back pain, joint pain, joint swelling, muscle cramps, muscle weakness, neck pain and stiffness. Negative for falls.   Gastrointestinal: Negative for vomiting.   Genitourinary: Negative for dysuria.   Neurological: Positive for numbness and paresthesias. Negative for dizziness and loss of balance.   Psychiatric/Behavioral: The  patient does not have insomnia.        Objective:   Body mass index is 29.46 kg/m².  Vitals:    03/23/18 0925   BP: 113/65   Pulse: (!) 58             General    Nursing note and vitals reviewed.  Constitutional: He is oriented to person, place, and time. He appears well-developed. No distress.   HENT:   Head: Normocephalic and atraumatic.   Eyes: EOM are normal.   Neck: Neck supple.   Cardiovascular: Normal rate.    Pulmonary/Chest: Effort normal.   Neurological: He is alert and oriented to person, place, and time.   Psychiatric: He has a normal mood and affect. His behavior is normal.           Right Knee Exam     Comments:  Exam of the right knee, unchanged, demonstrates no noticeable effusion.  Range of motions from 0-120° with no pain.  Hamstrings are well stretched.  Quadriceps tone is good.  Hip range of motion has very limited motion with 10° of external and 5° of internal at 90°.    Left Knee Exam     Comments:  Examination with standing demonstrates mild varus deformity of the left leg.  With ambulation he has no varus thrust.  He has a antalgic gait.  Exam of the left knee, unchanged, the left knee demonstrates moderate effusion.  He does have palpation over the medial joint line middle one third and posterior one third markedly.  Mildly tender to palpation over lateral joint line.  Tender to palpation over the border of the patella.  He does have full extension but can only flex to 90° with medial sided pain.  Crepitus noted to the patellofemoral joint with active and passive range of motion.  He is stable anterior drawer, stable locking, stable posterior drawer, stable varus valgus stress at 0 and 30°.  His varus does seem correctable at 30° with manual correction.  Mariana's was not tested today.  Distally he has sensation light touch intact to 2+ DP.  Pulse intact plantar flexion dorsiflexion tib ant and flexor hallucis longus.  His hip is very stiff with perhaps 5° of internal rotation and 10° of  external rotation.  I can flex to 90 but again a great deal of hip stiffness.  His hamstrings are tight at 10°.  Quadriceps tone is mildly decreased.    Right Hand/Wrist Exam     Tests   Finkelstein: negative  Carpal Tunnel Compression Test: negative  Cubital Tunnel Compression Test: negative      Other     Neuorologic Exam    Median Distribution: normal  Ulnar Distribution: normal  Radial Distribution: normal      Right Elbow Exam     Range of Motion   Extension: 0   Flexion: 140     Tests Tinel's Sign (cubital tunnel): negative    Other   Sensation: normal    Comments:  Triceps 4/5  Biceps -5/5   + Right spurlings    Minimally tender over biceps tendon and lateral epicondyle          Muscle Strength   Right Upper Extremity   Wrist Flexion: 4/5/5   : 4/5/5     Vascular Exam       Capillary Refill  Right Hand: normal capillary refill    Edema  Right Forearm: absent    XRAY R ELBOW 03/23/2018: Mild degenerative changes. No fracture. No abnormality.     Assessment:     Encounter Diagnoses   Name Primary?    Primary osteoarthritis of left knee Yes    Lateral epicondylitis of right elbow     Cervical radiculopathy         Plan:     1. Continue L knee medial  brace. We will hold on TKA for now for as long as he would like.    2. Continue PT for L knee; pt wishes ask therapist for R elbow at home exorcises/stretches. I advised letting our office know if therapist needs an order for this.     3. F/u with Dr. Benton for cervical spine    4. RTC PRN     Ford Alcantara MD    Orthopaedic Surgery  [facility]

## 2018-05-01 ENCOUNTER — OFFICE VISIT (OUTPATIENT)
Dept: URGENT CARE | Facility: CLINIC | Age: 48
End: 2018-05-01
Payer: COMMERCIAL

## 2018-05-01 ENCOUNTER — APPOINTMENT (OUTPATIENT)
Dept: RADIOLOGY | Facility: HOSPITAL | Age: 48
End: 2018-05-01
Attending: NURSE PRACTITIONER
Payer: COMMERCIAL

## 2018-05-01 VITALS
WEIGHT: 230.19 LBS | RESPIRATION RATE: 18 BRPM | HEART RATE: 80 BPM | SYSTOLIC BLOOD PRESSURE: 130 MMHG | BODY MASS INDEX: 28.02 KG/M2 | DIASTOLIC BLOOD PRESSURE: 84 MMHG | OXYGEN SATURATION: 98 % | TEMPERATURE: 97 F

## 2018-05-01 DIAGNOSIS — S60.112A SUBUNGUAL HEMATOMA OF LEFT THUMB, INITIAL ENCOUNTER: Primary | ICD-10-CM

## 2018-05-01 DIAGNOSIS — M79.641 PAIN OF RIGHT HAND: ICD-10-CM

## 2018-05-01 PROCEDURE — 3079F DIAST BP 80-89 MM HG: CPT | Mod: CPTII,S$GLB,, | Performed by: NURSE PRACTITIONER

## 2018-05-01 PROCEDURE — 73130 X-RAY EXAM OF HAND: CPT | Mod: TC,PO,RT

## 2018-05-01 PROCEDURE — 99999 PR PBB SHADOW E&M-EST. PATIENT-LVL V: CPT | Mod: PBBFAC,,, | Performed by: NURSE PRACTITIONER

## 2018-05-01 PROCEDURE — 73130 X-RAY EXAM OF HAND: CPT | Mod: 26,RT,, | Performed by: RADIOLOGY

## 2018-05-01 PROCEDURE — 3075F SYST BP GE 130 - 139MM HG: CPT | Mod: CPTII,S$GLB,, | Performed by: NURSE PRACTITIONER

## 2018-05-01 PROCEDURE — 99214 OFFICE O/P EST MOD 30 MIN: CPT | Mod: S$GLB,,, | Performed by: NURSE PRACTITIONER

## 2018-05-01 RX ORDER — MUPIROCIN 20 MG/G
OINTMENT TOPICAL 3 TIMES DAILY
Qty: 22 G | Refills: 0 | Status: SHIPPED | OUTPATIENT
Start: 2018-05-01

## 2018-05-01 RX ORDER — HYDROCODONE BITARTRATE AND ACETAMINOPHEN 5; 325 MG/1; MG/1
1 TABLET ORAL EVERY 12 HOURS PRN
Qty: 60 TABLET | Refills: 0 | Status: SHIPPED | OUTPATIENT
Start: 2018-06-02 | End: 2018-06-28 | Stop reason: SDUPTHER

## 2018-05-01 RX ORDER — HYDROCODONE BITARTRATE AND ACETAMINOPHEN 5; 325 MG/1; MG/1
1 TABLET ORAL EVERY 12 HOURS PRN
Qty: 60 TABLET | Refills: 0 | Status: SHIPPED | OUTPATIENT
Start: 2018-05-02 | End: 2018-05-01 | Stop reason: SDUPTHER

## 2018-05-01 NOTE — PROGRESS NOTES
Subjective:       Patient ID: Cornel Dick is a 47 y.o. male.    Chief Complaint: Finger Injury    47 year old male presents to Urgent Care with reports of left thumb pain resulting from him closing his finger in a car door. Reports that injury occurred last night      Hand Pain    The incident occurred 12 to 24 hours ago. The incident occurred at home. Injury mechanism: crushing. The pain is present in the left fingers (left thumb). The quality of the pain is described as aching. The pain does not radiate. The pain is at a severity of 4/10. The pain has been constant since the incident. Pertinent negatives include no chest pain or numbness. Nothing aggravates the symptoms.     Review of Systems   Constitutional: Negative for appetite change, chills and fever.   HENT: Negative for ear pain, sinus pressure, sore throat and trouble swallowing.    Eyes: Negative for visual disturbance.   Respiratory: Negative for shortness of breath.    Cardiovascular: Negative for chest pain.   Gastrointestinal: Negative for abdominal pain, diarrhea, nausea and vomiting.   Endocrine: Negative for cold intolerance, polyphagia and polyuria.   Genitourinary: Negative for decreased urine volume and dysuria.   Musculoskeletal: Negative for back pain.        Left thumb pain   Skin: Negative for rash.   Allergic/Immunologic: Negative for environmental allergies and food allergies.   Neurological: Negative for dizziness, tremors, weakness and numbness.   Hematological: Does not bruise/bleed easily.   Psychiatric/Behavioral: Negative for confusion and hallucinations. The patient is not nervous/anxious and is not hyperactive.    All other systems reviewed and are negative.      Objective:     Physical Exam   Constitutional: He is oriented to person, place, and time. He appears well-developed and well-nourished.   HENT:   Head: Normocephalic.   Right Ear: External ear normal.   Left Ear: External ear normal.   Nose: Nose normal.    Mouth/Throat: Oropharynx is clear and moist.   Eyes: Conjunctivae and EOM are normal. Pupils are equal, round, and reactive to light.   Neck: Normal range of motion. Neck supple. No JVD present.   Cardiovascular: Normal rate, regular rhythm, normal heart sounds and intact distal pulses.    Pulmonary/Chest: Effort normal and breath sounds normal. He has no wheezes.   Abdominal: Soft. Bowel sounds are normal. There is no tenderness.   Musculoskeletal:        Hands:  Lymphadenopathy:     He has no cervical adenopathy.   Neurological: He is alert and oriented to person, place, and time.   Skin: Skin is warm and dry.   Psychiatric: He has a normal mood and affect. His behavior is normal. Judgment and thought content normal.   Nursing note and vitals reviewed.  Obtained written and verbal consent. Cleaned area with betadine and  performed Triphination to left thumb. Moderate amount of blood from nail-Tolerated procedure well.   Assessment:     1. Subungual hematoma of left thumb, initial encounter    2. Pain of right hand      Plan:   Subungual hematoma of left thumb, initial encounter    Pain of right hand  -     X-Ray Hand Complete Right; Future; Expected date: 05/01/2018    Other orders  -     mupirocin (BACTROBAN) 2 % ointment; Apply topically 3 (three) times daily.  Dispense: 22 g; Refill: 0

## 2018-05-01 NOTE — PATIENT INSTRUCTIONS
Subungual Hematoma    You just slammed the car door on your finger. The pain is nearly unbearable, and your nail has turned black and blue. It's likely you have a subungual hematoma. This is a pool of blood that collects under a nail after an injury. Although a nail hematoma is seldom serious, it can be very painful.  When to go to the emergency room (ER)  Any severe blow to a finger or toe should be checked by your health care provider. You may have broken bones or damage to other tissues. If you can't see your health care provider right away, go to the nearest emergency department.  What to expect in the ER  · Your nail will be examined.  · X-rays may be taken to check for a bone fracture or other injury.  · To drain the blood from the hematoma and relieve pain, the health care provider may make a small hole in the nail using a special lisa or drill. The blood under the nail can drain out through this hole. The nail is then bandaged.  · If the nail is badly damaged it may need to be removed. Deep cuts beneath the nail can then be repaired with stitches.  Follow-up  If the damaged nail isn't removed, it will most likely fall off on its own. A fingernail can regrow in as little as 8 weeks. Toenails take longer -- about 6 months. See your health care provider if you have any problems with the nail as it heals and regrows.  Date Last Reviewed: 5/5/2015  © 8820-9471 [employer]. [address] [place] [address], [place] [address]. All rights reserved. This information is not intended as a substitute for professional medical care. Always follow your healthcare professional's instructions.

## 2018-05-04 ENCOUNTER — OFFICE VISIT (OUTPATIENT)
Dept: PAIN MEDICINE | Facility: CLINIC | Age: 48
End: 2018-05-04
Payer: COMMERCIAL

## 2018-05-04 VITALS
RESPIRATION RATE: 16 BRPM | HEART RATE: 66 BPM | WEIGHT: 230 LBS | SYSTOLIC BLOOD PRESSURE: 119 MMHG | HEIGHT: 76 IN | DIASTOLIC BLOOD PRESSURE: 77 MMHG | BODY MASS INDEX: 28.01 KG/M2

## 2018-05-04 DIAGNOSIS — M54.16 LUMBAR RADICULOPATHY: ICD-10-CM

## 2018-05-04 DIAGNOSIS — M51.36 DDD (DEGENERATIVE DISC DISEASE), LUMBAR: ICD-10-CM

## 2018-05-04 DIAGNOSIS — M54.12 CERVICAL RADICULOPATHY: ICD-10-CM

## 2018-05-04 DIAGNOSIS — M47.817 LUMBOSACRAL SPONDYLOSIS WITHOUT MYELOPATHY: ICD-10-CM

## 2018-05-04 DIAGNOSIS — M47.816 LUMBAR SPONDYLOSIS: ICD-10-CM

## 2018-05-04 DIAGNOSIS — M54.2 NECK PAIN: ICD-10-CM

## 2018-05-04 DIAGNOSIS — M47.22 OSTEOARTHRITIS OF SPINE WITH RADICULOPATHY, CERVICAL REGION: Primary | ICD-10-CM

## 2018-05-04 PROCEDURE — 99214 OFFICE O/P EST MOD 30 MIN: CPT | Mod: S$GLB,,, | Performed by: PHYSICIAN ASSISTANT

## 2018-05-04 PROCEDURE — 3008F BODY MASS INDEX DOCD: CPT | Mod: CPTII,S$GLB,, | Performed by: PHYSICIAN ASSISTANT

## 2018-05-04 PROCEDURE — 3074F SYST BP LT 130 MM HG: CPT | Mod: CPTII,S$GLB,, | Performed by: PHYSICIAN ASSISTANT

## 2018-05-04 PROCEDURE — 3078F DIAST BP <80 MM HG: CPT | Mod: CPTII,S$GLB,, | Performed by: PHYSICIAN ASSISTANT

## 2018-05-04 PROCEDURE — 99999 PR PBB SHADOW E&M-EST. PATIENT-LVL IV: CPT | Mod: PBBFAC,,, | Performed by: PHYSICIAN ASSISTANT

## 2018-05-04 NOTE — PROGRESS NOTES
Chief Pain Complaint:  Low-back Pain    Interval History: Patient was seen on 3/16/18. At that time he underwent  Right C6-7 TFESI. The patient reports that he is/was better following the procedure. The changes have continued through this visit. He reports 70% pain relief.  Patient was last seen on 3/9/2018. At that visit the plan was to .     Interval History: Patient was last seen on 2/6/2018. At that visit the plan was to continue medications and conservative treatment. His neck and arm pain has worsened, and he would like to move forward with an injection.    History of Present Illness:   Cornel Dick is a 47 y.o. male  who is presenting with a chief complaint of low back pain for >3 years. He has been bothered more with neck pain recently. The patient began experiencing this problem insidiously, and the pain has been gradually worsening over the past 5 week(s). The pain is described as throbbing, shooting, burning and electrical and is located in the right cervial spine. Pain is intermittent and lasts hours. The pain radiates to right arm. The patient rates his pain a 8 out of ten and interferes with activities of daily living a 8 out of ten. Pain is exacerbated by flexion of the cervial spine, and is improved by rest. Patient reports no prior trauma, prior lumbar surgery.     - pertinent negatives: No fever, No chills, No weight loss, No bladder dysfunction, No bowel dysfunction, No saddle anesthesia  - pertinent positives: generalized nonspecific Lower Extremity weakness bilaterally    - medications, other therapies tried (physical therapy, injections):     >> gabapentin, zanaflex, Tramadol, norco    >> Has previously undergone Physical Therapy    >> Has previously undergone spinal injection/s:    - Right L3/L4, L5/S1 TF KAVITA on 1-13-17 and 3-10-17 with only weeks of relief after these injections   - [employer] SCS trial on 5-3-17 [place] [facility]   - Right C6-7  TFESI on 3/16/18 with 70% pain relief      Imaging / Labs / Studies (reviewed on 5/4/2018):    2/12/18 CT Cervical Spine Without Contrast  Narrative: CT of c-spine without IV contrast  History: Cervicalgia  Technique: Standard cervical spine CT protocol was performed without IV contrast.  Finding: There is grade 1 retrolisthesis C5 on C6. There is no fracture or scoliosis. There is straightening of the normal cervical lordosis. There is mild concentric bulging of the intervertebral disc between C4 and C5.  Impression:   1. There is grade 1 retrolisthesis C5 on C6.   2. There is straightening of the normal cervical lordosis.   3. There is mild concentric bulging of the intervertebral disc between C4 and C5.          11/16/16 MRI Lumbar Spine Without Contrast     Narrative Technique:  Multiplanar, multisequence MR images were performed of the lumbar spine obtained without contrast.   Comparison: None.   Results:  Lumbar spine alignment is within normal limits. The vertebral body heights are well maintained, with no fracture.  No marrow signal abnormality suspicious for an infiltrative process.    The conus medullaris terminates at approximately the L1-L2 disk space.  The adjacent soft tissue structures show no significant abnormalities.  There is mild disc desiccation at the L3-L4 and L5-S1 discs.  Minimal disc space narrowing noted at these levels.  L1-L2: No significant central canal or neural foraminal narrowing.  L2-L3: No significant central canal or neural foraminal narrowing.  L3-L4: There is a broad-based right foraminal disc extrusion that results in moderate effacement of the exiting L3 nerve root.  No significant central canal narrowing.  L4-L5:  No significant central canal or neural foraminal narrowing.  L5-S1:  Broad-based right paracentral/right foraminal disc protrusion resulting in no significant central canal narrowing.  The right neural foraminal canal is mildly to moderately narrowed.  There is  "abutment of the exiting L5 nerve root.  No significant effacement of this nerve root.           5/13/16 X-Ray Lumbar Spine Complete 5 View     Narrative Five view lumbar spine.  Findings: Spinal alignment is anatomic.  Mild disc space narrowing and facet arthropathy at L5-S1.  Pedicles appear intact.  No compression fracture or subluxation.         Review of Systems:  CONSTITUTIONAL: patient denies any fever, chills, or weight loss  SKIN: patient denies any rash or itching  RESPIRATORY: patient denies having any shortness of breath  GASTROINTESTINAL: patient denies having any diarrhea, constipation, or bowel incontinence  GENITOURINARY: patient denies having any abnormal bladder function    MUSCULOSKELETAL:  - patient complains of the above noted pain/s (see chief pain complaint)    NEUROLOGICAL:   - pain as above  - strength in Upper extremities is intact, BILATERALLY  - sensation in Upper extremities is intact, BILATERALLY  - patient denies any loss of bowel or bladder control      PSYCHIATRIC: patient denies any change in mood    Other:  All other systems reviewed and are negative      Physical Exam:  Vitals:  /77 (BP Location: Right arm, Patient Position: Sitting, BP Method: Medium (Automatic))   Pulse 66   Resp 16   Ht 6' 4" (1.93 m)   Wt 104.3 kg (230 lb)   BMI 28.00 kg/m²   (reviewed on 5/4/2018)    General: alert and oriented, in no apparent distress.  Gait: normal gait.  Skin: no rashes, no discoloration, no obvious lesions  HEENT: normocephalic, atraumatic. Pupils equal and round.  Cardiovascular: no significant peripheral edema present.  Respiratory: without use of accessory muscles of respiration.    Musculoskeletal - Lumbar Spine:  - Pain on flexion of lumbar spine: Absent   - Pain on extension of lumbar spine: Absent   - Lumbar facet loading: Absent   - TTP over the lumbar facet joints: Absent  - TTP over the SI joints:  Absent   - Straight Leg Raise: Negative  - MAEVE: " "Negative    Musculoskeletal - Cervical Spine:  - Pain on flexion of cervical spine: Present  - Pain on extension of cervical spine: Absent   - Cervical facet loading: Absent   - TTP over the cervical facet joints: Absent  - Spurling's: Positive on right    Neuro - Extremities:  - BUE Strength:R/L: D: 5/5; B: 5/5; T: 5/5; WF: 5/5; WE: 5/5; IO: 5/5  - BLE Strength: R/L: HF: 5/5, HE: 5/5, KF: 5/5; KE: 5/5; FE: 5/5; FF: 5/5  - Extremity Reflexes: Brisk and symmetric throughout  - Sensory: Sensation to light touch intact bilaterally    Psych:  Mood and affect is appropriate        Assessment:  Cornel Dick is a 47 y.o. year old male who is presenting with     Encounter Diagnoses   Name Primary?    Osteoarthritis of spine with radiculopathy, cervical region Yes    Lumbar spondylosis     Lumbar radiculopathy     DDD (degenerative disc disease), lumbar     Neck pain     Lumbosacral spondylosis without myelopathy     Cervical radiculopathy       Patient returns for follow-up. He has bilateral leg pain (R>L) across dermatomes, primarily extending along the posterior aspects of the legs. He has low back pain as well.  MRI shows DDD at L3/4 and L5/S1 with right side extension and encroachment of nerve roots, NF narrowing, and facet disease as well. There seems to be no indication for left side radicular pain based on the MRI. He has been seen by Neurosurgery at the [facility], who recommended spinal injections (records attached into "Media"). He ultimately had a spinal cord stimulator trial on 5-3-17 with [employer] dual 16 contact percutaneous leads.  He went on to have the SCS [facility]. This has provided a lot of relief of his lower extremity pain.        Plan:  1. Interventional:   - S/p Right C6-7 TFESI on 3/16/18 with 70% pain relief.    2. Pharmacologic:   - Refill Norco 5/325 mg Po BID PRN (60 tabs) x 2 months.  - Continue gabapentin 800 mg PO TID and Tizanidine 4 " mg Po BID PRN.   - Opioid contract signed on 2-6-18.     - LA  reviewed and appropriate. Last filled on 3-10-18.  - Will order UDS today to ensure medication compliance.      3. Rehabilitative: Encouraged regular exercise. Consider PT.    4. Diagnostic: None.    5. Follow up: 8 weeks for med refill.    - I discussed the risks, benefits, and alternatives to potential treatment options. All questions and concerns were fully addressed today in clinic. Dr. Benton was consulted regarding the patient plan and agrees.             >>Opioid Risk Tool:  12/20/2016 :: 1    >>UDS:  12/20/2016 :: negative (at initial consult)  8/14/2017 :: negative   5/4/2018 :: pending    >> Pain Disability Index:  2/6/2017 :: 39  3/20/2017 :: 43  4/6/2017 :: 46  5/8/2017 :: 35  7/17/2017 :: 50  8/14/2017 :: 55  10/16/2017 :: 29

## 2018-06-28 RX ORDER — HYDROCODONE BITARTRATE AND ACETAMINOPHEN 5; 325 MG/1; MG/1
1 TABLET ORAL EVERY 12 HOURS PRN
Qty: 60 TABLET | Refills: 0 | Status: SHIPPED | OUTPATIENT
Start: 2018-07-13 | End: 2018-06-29 | Stop reason: SDUPTHER

## 2018-06-28 RX ORDER — HYDROCODONE BITARTRATE AND ACETAMINOPHEN 5; 325 MG/1; MG/1
1 TABLET ORAL EVERY 12 HOURS PRN
Qty: 60 TABLET | Refills: 0 | Status: SHIPPED | OUTPATIENT
Start: 2018-08-11 | End: 2018-06-29 | Stop reason: SDUPTHER

## 2018-06-29 ENCOUNTER — OFFICE VISIT (OUTPATIENT)
Dept: PAIN MEDICINE | Facility: CLINIC | Age: 48
End: 2018-06-29
Payer: COMMERCIAL

## 2018-06-29 ENCOUNTER — OFFICE VISIT (OUTPATIENT)
Dept: ORTHOPEDICS | Facility: CLINIC | Age: 48
End: 2018-06-29
Payer: COMMERCIAL

## 2018-06-29 VITALS
BODY MASS INDEX: 28 KG/M2 | HEART RATE: 70 BPM | HEIGHT: 76 IN | WEIGHT: 229.94 LBS | SYSTOLIC BLOOD PRESSURE: 134 MMHG | DIASTOLIC BLOOD PRESSURE: 83 MMHG

## 2018-06-29 VITALS
WEIGHT: 229 LBS | BODY MASS INDEX: 27.89 KG/M2 | HEART RATE: 64 BPM | DIASTOLIC BLOOD PRESSURE: 81 MMHG | SYSTOLIC BLOOD PRESSURE: 123 MMHG | HEIGHT: 76 IN

## 2018-06-29 DIAGNOSIS — M47.816 LUMBAR SPONDYLOSIS: ICD-10-CM

## 2018-06-29 DIAGNOSIS — M51.36 DDD (DEGENERATIVE DISC DISEASE), LUMBAR: ICD-10-CM

## 2018-06-29 DIAGNOSIS — M54.2 NECK PAIN: ICD-10-CM

## 2018-06-29 DIAGNOSIS — M47.817 LUMBOSACRAL SPONDYLOSIS WITHOUT MYELOPATHY: ICD-10-CM

## 2018-06-29 DIAGNOSIS — M54.16 LUMBAR RADICULOPATHY: ICD-10-CM

## 2018-06-29 DIAGNOSIS — M47.22 OSTEOARTHRITIS OF SPINE WITH RADICULOPATHY, CERVICAL REGION: Primary | ICD-10-CM

## 2018-06-29 DIAGNOSIS — M17.12 PRIMARY OSTEOARTHRITIS OF LEFT KNEE: Primary | ICD-10-CM

## 2018-06-29 PROCEDURE — 3008F BODY MASS INDEX DOCD: CPT | Mod: CPTII,S$GLB,, | Performed by: ORTHOPAEDIC SURGERY

## 2018-06-29 PROCEDURE — 99214 OFFICE O/P EST MOD 30 MIN: CPT | Mod: S$GLB,,, | Performed by: PHYSICIAN ASSISTANT

## 2018-06-29 PROCEDURE — 99213 OFFICE O/P EST LOW 20 MIN: CPT | Mod: S$GLB,,, | Performed by: ORTHOPAEDIC SURGERY

## 2018-06-29 PROCEDURE — 3075F SYST BP GE 130 - 139MM HG: CPT | Mod: CPTII,S$GLB,, | Performed by: ORTHOPAEDIC SURGERY

## 2018-06-29 PROCEDURE — 99999 PR PBB SHADOW E&M-EST. PATIENT-LVL IV: CPT | Mod: PBBFAC,,, | Performed by: PHYSICIAN ASSISTANT

## 2018-06-29 PROCEDURE — 99999 PR PBB SHADOW E&M-EST. PATIENT-LVL IV: CPT | Mod: PBBFAC,,, | Performed by: ORTHOPAEDIC SURGERY

## 2018-06-29 PROCEDURE — 3079F DIAST BP 80-89 MM HG: CPT | Mod: CPTII,S$GLB,, | Performed by: PHYSICIAN ASSISTANT

## 2018-06-29 PROCEDURE — 3079F DIAST BP 80-89 MM HG: CPT | Mod: CPTII,S$GLB,, | Performed by: ORTHOPAEDIC SURGERY

## 2018-06-29 PROCEDURE — 3008F BODY MASS INDEX DOCD: CPT | Mod: CPTII,S$GLB,, | Performed by: PHYSICIAN ASSISTANT

## 2018-06-29 PROCEDURE — 3074F SYST BP LT 130 MM HG: CPT | Mod: CPTII,S$GLB,, | Performed by: PHYSICIAN ASSISTANT

## 2018-06-29 RX ORDER — GABAPENTIN 800 MG/1
800 TABLET ORAL 3 TIMES DAILY
Qty: 90 TABLET | Refills: 1 | Status: SHIPPED | OUTPATIENT
Start: 2018-06-29 | End: 2018-09-07 | Stop reason: SDUPTHER

## 2018-06-29 RX ORDER — HYDROCODONE BITARTRATE AND ACETAMINOPHEN 5; 325 MG/1; MG/1
1 TABLET ORAL EVERY 12 HOURS PRN
Qty: 60 TABLET | Refills: 0 | Status: SHIPPED | OUTPATIENT
Start: 2018-08-11 | End: 2018-09-10

## 2018-06-29 RX ORDER — HYDROCODONE BITARTRATE AND ACETAMINOPHEN 5; 325 MG/1; MG/1
1 TABLET ORAL EVERY 12 HOURS PRN
Qty: 60 TABLET | Refills: 0 | Status: SHIPPED | OUTPATIENT
Start: 2018-07-13 | End: 2018-08-12

## 2018-06-29 NOTE — PROGRESS NOTES
Subjective:     Patient ID: Cornel Dick is a 48 y.o. male.    Chief Complaint: Pain of the Left Knee and Pain of the Right Elbow    Left knee pain has improved since last visit, better with new medial  brace, better with stretching and physical therapy. Using cane but walking faster, good days are outnumbering bad days and this was not the case before. Right elbow pain improved since also.        Knee Pain    The pain is present in the left knee. The pain radiates to the left foot, groin and left thigh. This is a chronic problem. The current episode started more than 1 year ago. There has been no history of extremity trauma. Movement associated with injury: no injury.The problem occurs constantly. The problem has been unchanged. The quality of the pain is described as aching, sharp and shooting. The pain is at a severity of 5/10. Associated symptoms include numbness and stiffness. Pertinent negatives include no fever or itching. The symptoms are aggravated by activity and bearing weight. He has tried injection treatment and oral narcotics (Left Knee ATS) for the symptoms. The treatment provided mild relief. Physical therapy was effective.      Past Medical History:   Diagnosis Date    Anemia 2/21/2018    Arthritis     Benign essential hypertension 2/21/2018    Drug-induced erectile dysfunction 2/21/2018    HBP (high blood pressure)     Renal insufficiency 2/21/2018     Past Surgical History:   Procedure Laterality Date    back stimulator      KNEE ARTHROSCOPY Left      Family History   Problem Relation Age of Onset    Heart disease Father      Social History     Social History    Marital status:      Spouse name: N/A    Number of children: N/A    Years of education: N/A     Occupational History    Not on file.     Social History Main Topics    Smoking status: Never Smoker    Smokeless tobacco: Never Used    Alcohol use No    Drug use: No    Sexual activity: Yes      Partners: Female     Other Topics Concern    Not on file     Social History Narrative    No narrative on file     Medication List with Changes/Refills   Current Medications    ACETAMINOPHEN (ARTHRITIS PAIN RELIEVER ORAL)    Take by mouth.    AMLODIPINE (NORVASC) 10 MG TABLET    Take 10 mg by mouth.    BAICALIN-CATECHIN (LIMBREL) 500 MG CAP    Take 500 mg by mouth 2 (two) times daily.    CELECOXIB (CELEBREX) 200 MG CAPSULE    Take 1 capsule (200 mg total) by mouth 2 (two) times daily as needed for Pain.    EFINACONAZOLE (JUBLIA) 10 % ANITHA    Apply 1 application topically once daily.    GABAPENTIN (NEURONTIN) 800 MG TABLET    Take 1 tablet (800 mg total) by mouth 3 (three) times daily.    HYDRALAZINE (APRESOLINE) 100 MG TABLET        HYDROCODONE-ACETAMINOPHEN (NORCO) 5-325 MG PER TABLET    Take 1 tablet by mouth every 12 (twelve) hours as needed for Pain.    HYDROCODONE-ACETAMINOPHEN (NORCO) 5-325 MG PER TABLET    Take 1 tablet by mouth every 12 (twelve) hours as needed for Pain.    LISINOPRIL-HYDROCHLOROTHIAZIDE (PRINZIDE,ZESTORETIC) 20-12.5 MG PER TABLET        MUPIROCIN (BACTROBAN) 2 % OINTMENT    Apply topically 3 (three) times daily.    MV-MN/FA/LYCOPENE/LUT/HB#178 (HUBERT MULTIVITAMIN FOR MEN ORAL)    Take by mouth.    OXAPROZIN (DAYPRO) 600 MG TABLET        SILDENAFIL, ANTIHYPERTENSIVE, (REVATIO) 20 MG TAB        SIMVASTATIN (ZOCOR) 20 MG TABLET    Take 1 tablet (20 mg total) by mouth every evening. - for heart health    TIZANIDINE (ZANAFLEX) 4 MG TABLET    TAKE ONE TABLET BY MOUTH TWICE DAILY AS NEEDED    TRIUMEQ 600- MG TAB         Review of patient's allergies indicates:   Allergen Reactions    Chocolate flavor Swelling     Review of Systems   Constitution: Negative for fever.   HENT: Negative for sore throat.    Eyes: Negative for blurred vision.   Cardiovascular: Negative for dyspnea on exertion.   Respiratory: Negative for shortness of breath.    Hematologic/Lymphatic: Does not bruise/bleed easily.    Skin: Negative for itching.   Musculoskeletal: Positive for arthritis, back pain, joint pain, joint swelling, muscle cramps, muscle weakness, neck pain and stiffness. Negative for falls.   Gastrointestinal: Negative for vomiting.   Genitourinary: Negative for dysuria.   Neurological: Positive for numbness and paresthesias. Negative for dizziness and loss of balance.   Psychiatric/Behavioral: The patient does not have insomnia.        Objective:   Body mass index is 27.99 kg/m².  Vitals:    06/29/18 0937   BP: 134/83   Pulse: 70             General    Nursing note and vitals reviewed.  Constitutional: He is oriented to person, place, and time. He appears well-developed. No distress.   HENT:   Head: Normocephalic and atraumatic.   Eyes: EOM are normal.   Neck: Neck supple.   Cardiovascular: Normal rate.    Pulmonary/Chest: Effort normal.   Neurological: He is alert and oriented to person, place, and time.   Psychiatric: He has a normal mood and affect. His behavior is normal.           Right Knee Exam     Comments:  Exam of the right knee, unchanged, demonstrates no noticeable effusion.  Range of motions from 0-120° with no pain.  Hamstrings are well stretched.  Quadriceps tone is good.  Hip range of motion has very limited motion with 10° of external and 5° of internal at 90°.    Left Knee Exam     Comments:  Examination with standing demonstrates mild varus deformity of the left leg.  With ambulation he has no varus thrust.  He has a antalgic gait.  Exam of the left knee, unchanged, the left knee demonstrates moderate effusion.  He does have palpation over the medial joint line middle one third and posterior one third markedly.  Mildly tender to palpation over lateral joint line.  Tender to palpation over the border of the patella.  He does have full extension and can flex to 110.  Crepitus noted to the patellofemoral joint with active and passive range of motion.  He is stable anterior drawer, stable locking,  stable posterior drawer, stable varus valgus stress at 0 and 30°.  His varus does seem correctable at 30° with manual correction.  Mariana's was not tested today.  Distally he has sensation light touch intact to 2+ DP.  Pulse intact plantar flexion dorsiflexion tib ant and flexor hallucis longus.      His hip is stiff with perhaps 5° of internal rotation and 10° of external rotation.  I can flex to 90 but again a great deal of hip stiffness.  Quadriceps tone is mildly decreased.Right Elbow Exam     Tests Tinel's Sign (cubital tunnel): negative          Muscle Strength   Right Upper Extremity   Wrist Flexion: 4/5/5   : 4/5/5         Assessment:     Encounter Diagnosis   Name Primary?    Primary osteoarthritis of left knee Yes        Plan:     1. Continue L knee medial  brace. We will hold on TKA for now for as long as he would like    2. Continue PT for L knee to transition HEP    3. RTC in 6 months

## 2018-06-29 NOTE — PROGRESS NOTES
Chief Pain Complaint:  8 wk luisa    Interval History: Patient was seen on 3/16/18. At that time he underwent  Right C6-7 TFESI. The patient reports that he is/was better following the procedure. The changes have continued through this visit. He reports 70% pain relief.  Patient was last seen on 3/9/2018. At that visit the plan was to .     Interval History: Patient was last seen on 2/6/2018. At that visit the plan was to continue medications and conservative treatment. His neck and arm pain has worsened, and he would like to move forward with an injection.    History of Present Illness:   Cornel Dick is a 48 y.o. male  who is presenting with a chief complaint of low back pain for >3 years. He has been bothered more with neck pain recently. The patient began experiencing this problem insidiously, and the pain has been gradually worsening over the past 5 week(s). The pain is described as throbbing, shooting, burning and electrical and is located in the right cervial spine. Pain is intermittent and lasts hours. The pain radiates to right arm. The patient rates his pain a 8 out of ten and interferes with activities of daily living a 8 out of ten. Pain is exacerbated by flexion of the cervial spine, and is improved by rest. Patient reports no prior trauma, prior lumbar surgery.     - pertinent negatives: No fever, No chills, No weight loss, No bladder dysfunction, No bowel dysfunction, No saddle anesthesia  - pertinent positives: generalized nonspecific Lower Extremity weakness bilaterally    - medications, other therapies tried (physical therapy, injections):     >> gabapentin, zanaflex, Tramadol, norco    >> Has previously undergone Physical Therapy    >> Has previously undergone spinal injection/s:    - Right L3/L4, L5/S1 TF KAVITA on 1-13-17 and 3-10-17 with only weeks of relief after these injections   - [place] Sci SCS trial on 5-3-17 followed by permanent implantation at [facility] [place] thereafter   - Right C6-7 TFESI  on 3/16/18 with 70% pain relief      Imaging / Labs / Studies (reviewed on 6/29/2018):    2/12/18 CT Cervical Spine Without Contrast  Narrative: CT of c-spine without IV contrast  History: Cervicalgia  Technique: Standard cervical spine CT protocol was performed without IV contrast.  Finding: There is grade 1 retrolisthesis C5 on C6. There is no fracture or scoliosis. There is straightening of the normal cervical lordosis. There is mild concentric bulging of the intervertebral disc between C4 and C5.  Impression:   1. There is grade 1 retrolisthesis C5 on C6.   2. There is straightening of the normal cervical lordosis.   3. There is mild concentric bulging of the intervertebral disc between C4 and C5.          11/16/16 MRI Lumbar Spine Without Contrast     Narrative Technique:  Multiplanar, multisequence MR images were performed of the lumbar spine obtained without contrast.   Comparison: None.   Results:  Lumbar spine alignment is within normal limits. The vertebral body heights are well maintained, with no fracture.  No marrow signal abnormality suspicious for an infiltrative process.    The conus medullaris terminates at approximately the L1-L2 disk space.  The adjacent soft tissue structures show no significant abnormalities.  There is mild disc desiccation at the L3-L4 and L5-S1 discs.  Minimal disc space narrowing noted at these levels.  L1-L2: No significant central canal or neural foraminal narrowing.  L2-L3: No significant central canal or neural foraminal narrowing.  L3-L4: There is a broad-based right foraminal disc extrusion that results in moderate effacement of the exiting L3 nerve root.  No significant central canal narrowing.  L4-L5:  No significant central canal or neural foraminal narrowing.  L5-S1:  Broad-based right paracentral/right foraminal disc protrusion resulting in no significant central canal narrowing.  The right neural foraminal canal is mildly to moderately narrowed.  There is abutment  "of the exiting L5 nerve root.  No significant effacement of this nerve root.           5/13/16 X-Ray Lumbar Spine Complete 5 View     Narrative Five view lumbar spine.  Findings: Spinal alignment is anatomic.  Mild disc space narrowing and facet arthropathy at L5-S1.  Pedicles appear intact.  No compression fracture or subluxation.         Review of Systems:  CONSTITUTIONAL: patient denies any fever, chills, or weight loss  SKIN: patient denies any rash or itching  RESPIRATORY: patient denies having any shortness of breath  GASTROINTESTINAL: patient denies having any diarrhea, constipation, or bowel incontinence  GENITOURINARY: patient denies having any abnormal bladder function    MUSCULOSKELETAL:  - patient complains of the above noted pain/s (see chief pain complaint)    NEUROLOGICAL:   - pain as above  - strength in Upper extremities is intact, BILATERALLY  - sensation in Upper extremities is intact, BILATERALLY  - patient denies any loss of bowel or bladder control      PSYCHIATRIC: patient denies any change in mood    Other:  All other systems reviewed and are negative      Physical Exam:  Vitals:  /81 (BP Location: Right arm)   Pulse 64   Ht 6' 4" (1.93 m)   Wt 103.9 kg (229 lb)   BMI 27.87 kg/m²   (reviewed on 6/29/2018)    General: alert and oriented, in no apparent distress.  Gait: normal gait.  Skin: no rashes, no discoloration, no obvious lesions  HEENT: normocephalic, atraumatic. Pupils equal and round.  Cardiovascular: no significant peripheral edema present.  Respiratory: without use of accessory muscles of respiration.    Musculoskeletal - Lumbar Spine:  - Pain on flexion of lumbar spine: Absent   - Pain on extension of lumbar spine: Absent   - Lumbar facet loading: Absent   - TTP over the lumbar facet joints: Absent  - TTP over the SI joints:  Absent   - Straight Leg Raise: Negative  - MAEVE: Negative    Musculoskeletal - Cervical Spine:  - Pain on flexion of cervical spine: Present  - " "Pain on extension of cervical spine: Absent   - Cervical facet loading: Absent   - TTP over the cervical facet joints: Absent  - Spurling's: Positive on right    Neuro - Extremities:  - BUE Strength:R/L: D: 5/5; B: 5/5; T: 5/5; WF: 5/5; WE: 5/5; IO: 5/5  - BLE Strength: R/L: HF: 5/5, HE: 5/5, KF: 5/5; KE: 5/5; FE: 5/5; FF: 5/5  - Extremity Reflexes: Brisk and symmetric throughout  - Sensory: Sensation to light touch intact bilaterally    Psych:  Mood and affect is appropriate        Assessment:  Cornel Dick is a 48 y.o. year old male who is presenting with     Encounter Diagnoses   Name Primary?    Osteoarthritis of spine with radiculopathy, cervical region Yes    Lumbar spondylosis     Lumbar radiculopathy     DDD (degenerative disc disease), lumbar     Neck pain     Lumbosacral spondylosis without myelopathy       Patient returns for follow-up. He has bilateral leg pain (R>L) across dermatomes, primarily extending along the posterior aspects of the legs. He has low back pain as well.  MRI shows DDD at L3/4 and L5/S1 with right side extension and encroachment of nerve roots, NF narrowing, and facet disease as well. There seems to be no indication for left side radicular pain based on the MRI. [place] [facility] spinal injections (records attached into "Media"). He ultimately had a spinal cord stimulator trial on 5-3-17 with [employer] dual 16 contact percutaneous leads.  He went on to have the SCS permanently implanted at the [facility]. This has provided a lot of relief of his lower extremity pain.        Plan:  1. Interventional:   - S/p Right C6-7 TFESI on 3/16/18 with 70% pain relief.    2. Pharmacologic:   - Refill Norco 5/325 mg Po BID PRN (60 tabs) x 2 months. Paper Rx given.  - Refill gabapentin 800 mg PO TID and Tizanidine 4 mg Po BID PRN.   - Opioid contract signed on 2-6-18.     - LA  reviewed and appropriate. Last filled on " 6-14-18.  - Will order UDS next visit to ensure medication compliance.      3. Rehabilitative: Encouraged regular exercise. Consider PT.    4. Diagnostic: None.    5. Follow up: 8 weeks for med refill.    - I discussed the risks, benefits, and alternatives to potential treatment options. All questions and concerns were fully addressed today in clinic. Dr. Benton was consulted regarding the patient plan and agrees.             >>Opioid Risk Tool:  12/20/2016 :: 1    >>UDS:  12/20/2016 :: negative (at initial consult)  8/14/2017 :: negative   5/4/2018 :: negative (overdue)

## 2018-09-05 RX ORDER — HYDROCODONE BITARTRATE AND ACETAMINOPHEN 5; 325 MG/1; MG/1
1 TABLET ORAL EVERY 12 HOURS PRN
Qty: 60 TABLET | Refills: 0 | Status: SHIPPED | OUTPATIENT
Start: 2018-09-28 | End: 2018-10-28

## 2018-09-05 RX ORDER — HYDROCODONE BITARTRATE AND ACETAMINOPHEN 5; 325 MG/1; MG/1
1 TABLET ORAL EVERY 12 HOURS PRN
Qty: 60 TABLET | Refills: 0 | Status: SHIPPED | OUTPATIENT
Start: 2018-10-27 | End: 2018-11-16 | Stop reason: SDUPTHER

## 2018-09-07 ENCOUNTER — OFFICE VISIT (OUTPATIENT)
Dept: PAIN MEDICINE | Facility: CLINIC | Age: 48
End: 2018-09-07
Payer: COMMERCIAL

## 2018-09-07 VITALS
DIASTOLIC BLOOD PRESSURE: 79 MMHG | HEART RATE: 68 BPM | WEIGHT: 229 LBS | BODY MASS INDEX: 27.89 KG/M2 | SYSTOLIC BLOOD PRESSURE: 129 MMHG | HEIGHT: 76 IN | RESPIRATION RATE: 18 BRPM

## 2018-09-07 DIAGNOSIS — M47.816 LUMBAR SPONDYLOSIS: ICD-10-CM

## 2018-09-07 DIAGNOSIS — M54.2 NECK PAIN: ICD-10-CM

## 2018-09-07 DIAGNOSIS — M51.36 DDD (DEGENERATIVE DISC DISEASE), LUMBAR: ICD-10-CM

## 2018-09-07 DIAGNOSIS — M47.22 OSTEOARTHRITIS OF SPINE WITH RADICULOPATHY, CERVICAL REGION: Primary | ICD-10-CM

## 2018-09-07 DIAGNOSIS — M54.16 LUMBAR RADICULOPATHY: ICD-10-CM

## 2018-09-07 PROCEDURE — 3008F BODY MASS INDEX DOCD: CPT | Mod: CPTII,S$GLB,, | Performed by: PHYSICIAN ASSISTANT

## 2018-09-07 PROCEDURE — 3078F DIAST BP <80 MM HG: CPT | Mod: CPTII,S$GLB,, | Performed by: PHYSICIAN ASSISTANT

## 2018-09-07 PROCEDURE — 99999 PR PBB SHADOW E&M-EST. PATIENT-LVL IV: CPT | Mod: PBBFAC,,, | Performed by: PHYSICIAN ASSISTANT

## 2018-09-07 PROCEDURE — 3074F SYST BP LT 130 MM HG: CPT | Mod: CPTII,S$GLB,, | Performed by: PHYSICIAN ASSISTANT

## 2018-09-07 PROCEDURE — 99214 OFFICE O/P EST MOD 30 MIN: CPT | Mod: S$GLB,,, | Performed by: PHYSICIAN ASSISTANT

## 2018-09-07 RX ORDER — GABAPENTIN 800 MG/1
800 TABLET ORAL 3 TIMES DAILY
Qty: 90 TABLET | Refills: 1 | Status: SHIPPED | OUTPATIENT
Start: 2018-09-07 | End: 2018-10-07

## 2018-09-07 NOTE — PROGRESS NOTES
Chief Pain Complaint:  Cervical Spine Pain (C-spine)        History of Present Illness:   Cornel Dick is a 48 y.o. male  who is presenting with a chief complaint of low back pain for >3 years. He has been bothered more with neck pain recently. The patient began experiencing this problem insidiously, and the pain has been gradually worsening over the past 5 week(s). The pain is described as throbbing, shooting, burning and electrical and is located in the right cervial spine. Pain is intermittent and lasts hours. The pain radiates to right arm. The patient rates his pain a 8 out of ten and interferes with activities of daily living a 8 out of ten. Pain is exacerbated by flexion of the cervial spine, and is improved by rest. Patient reports no prior trauma, prior lumbar surgery.     - pertinent negatives: No fever, No chills, No weight loss, No bladder dysfunction, No bowel dysfunction, No saddle anesthesia  - pertinent positives: generalized nonspecific Lower Extremity weakness bilaterally    - medications, other therapies tried (physical therapy, injections):     >> gabapentin, zanaflex, Tramadol, norco    >> Has previously undergone Physical Therapy    >> Has previously undergone spinal injection/s:    - Right L3/L4, L5/S1 TF KAVITA on 1-13-17 and 3-10-17 with only weeks of relief after these injections   - [place] Sci SCS trial on 5-3-17 [place] [facility] [place]   - Right C6-7 TFESI on 3/16/18 with 70% pain relief        Imaging / Labs / Studies (reviewed on 9/7/2018):    2/12/18 CT Cervical Spine Without Contrast  Narrative: CT of c-spine without IV contrast  History: Cervicalgia  Technique: Standard cervical spine CT protocol was performed without IV contrast.  Finding: There is grade 1 retrolisthesis C5 on C6. There is no fracture or scoliosis. There is straightening of the normal cervical lordosis. There is mild concentric bulging of the intervertebral disc between C4 and  C5.  Impression:   1. There is grade 1 retrolisthesis C5 on C6.   2. There is straightening of the normal cervical lordosis.   3. There is mild concentric bulging of the intervertebral disc between C4 and C5.          11/16/16 MRI Lumbar Spine Without Contrast     Narrative Technique:  Multiplanar, multisequence MR images were performed of the lumbar spine obtained without contrast.   Comparison: None.   Results:  Lumbar spine alignment is within normal limits. The vertebral body heights are well maintained, with no fracture.  No marrow signal abnormality suspicious for an infiltrative process.    The conus medullaris terminates at approximately the L1-L2 disk space.  The adjacent soft tissue structures show no significant abnormalities.  There is mild disc desiccation at the L3-L4 and L5-S1 discs.  Minimal disc space narrowing noted at these levels.  L1-L2: No significant central canal or neural foraminal narrowing.  L2-L3: No significant central canal or neural foraminal narrowing.  L3-L4: There is a broad-based right foraminal disc extrusion that results in moderate effacement of the exiting L3 nerve root.  No significant central canal narrowing.  L4-L5:  No significant central canal or neural foraminal narrowing.  L5-S1:  Broad-based right paracentral/right foraminal disc protrusion resulting in no significant central canal narrowing.  The right neural foraminal canal is mildly to moderately narrowed.  There is abutment of the exiting L5 nerve root.  No significant effacement of this nerve root.           5/13/16 X-Ray Lumbar Spine Complete 5 View     Narrative Five view lumbar spine.  Findings: Spinal alignment is anatomic.  Mild disc space narrowing and facet arthropathy at L5-S1.  Pedicles appear intact.  No compression fracture or subluxation.         Review of Systems:  CONSTITUTIONAL: patient denies any fever, chills, or weight loss  SKIN: patient denies any rash or itching  RESPIRATORY: patient denies  "having any shortness of breath  GASTROINTESTINAL: patient denies having any diarrhea, constipation, or bowel incontinence  GENITOURINARY: patient denies having any abnormal bladder function    MUSCULOSKELETAL:  - patient complains of the above noted pain/s (see chief pain complaint)    NEUROLOGICAL:   - pain as above  - strength in Upper extremities is intact, BILATERALLY  - sensation in Upper extremities is intact, BILATERALLY  - patient denies any loss of bowel or bladder control      PSYCHIATRIC: patient denies any change in mood    Other:  All other systems reviewed and are negative        Physical Exam:  Vitals:  /79 (BP Location: Right arm, Patient Position: Sitting, BP Method: Medium (Automatic))   Pulse 68   Resp 18   Ht 6' 4" (1.93 m)   Wt 103.9 kg (229 lb)   BMI 27.87 kg/m²   (reviewed on 9/7/2018)    General: alert and oriented, in no apparent distress.  Gait: normal gait.  Skin: no rashes, no discoloration, no obvious lesions  HEENT: normocephalic, atraumatic. Pupils equal and round.  Cardiovascular: no significant peripheral edema present.  Respiratory: without use of accessory muscles of respiration.    Musculoskeletal - Lumbar Spine:  - Pain on flexion of lumbar spine: Absent   - Pain on extension of lumbar spine: Absent   - Lumbar facet loading: Absent   - TTP over the lumbar facet joints: Absent  - TTP over the SI joints:  Absent   - Straight Leg Raise: Negative  - MAEVE: Negative    Musculoskeletal - Cervical Spine:  - Pain on flexion of cervical spine: Present  - Pain on extension of cervical spine: Absent   - Cervical facet loading: Absent   - TTP over the cervical facet joints: Absent  - Spurling's: Positive on right    Knee:  - Left knee in stabilizing brace    Neuro - Extremities:  - BUE Strength:R/L: D: 5/5; B: 5/5; T: 5/5; WF: 5/5; WE: 5/5; IO: 5/5  - BLE Strength: R/L: HF: 5/5, HE: 5/5, KF: 5/5; KE: 5/5; FE: 5/5; FF: 5/5  - Extremity Reflexes: Brisk and symmetric throughout  - " "Sensory: Sensation to light touch intact bilaterally    Psych:  Mood and affect is appropriate        Assessment:  Cornel Dick is a 48 y.o. year old male who is presenting with     Encounter Diagnoses   Name Primary?    Osteoarthritis of spine with radiculopathy, cervical region Yes    Lumbar spondylosis     Lumbar radiculopathy     DDD (degenerative disc disease), lumbar     Neck pain       Patient returns for follow-up. He has bilateral leg pain (R>L) across dermatomes, primarily extending along the posterior aspects of the legs. He has low back pain as well.  MRI shows DDD at L3/4 and L5/S1 with right side extension and encroachment of nerve roots, NF narrowing, and facet disease as well. There seems to be no indication for left side radicular pain based on the MRI. He has been [place] [facility] recommended spinal injections (records attached into "Media"). He ultimately had a spinal cord stimulator trial on 5-3-17 with [employer] dual 16 contact percutaneous leads.  He went on to have the SCS permanently implanted at the [facility]. This has provided a lot of relief of his lower extremity pain.  He also complains of neck and right arm pain.  He has had great relief after cervical TF KAVITA.       Plan:  1. Interventional: None for now.    2. Pharmacologic:   - Refill Norco 5/325 mg Po BID PRN (60 tabs) x 2 months. Paper Rx given. Patient tolerating opioids with no side effects, obtaining good pain control with functional improvement.  We have discussed the risks of chronic opioid medication management.   - Refill gabapentin 800 mg PO TID.  Continue Tizanidine 4 mg Po BID PRN.   - Opioid contract signed on 2-6-18.     - LA  reviewed and appropriate. Last filled on 8-30-18. Will post date accordingly.   - Will order UDS today to ensure medication compliance.      3. Rehabilitative: Encouraged regular exercise. Consider PT.    4. Diagnostic: None.    5. Follow up: " 10 weeks for med refill.    - I discussed the risks, benefits, and alternatives to potential treatment options. All questions and concerns were fully addressed today in clinic. Dr. Benton was consulted regarding the patient plan and agrees.         >>UDS:  12/20/2016 :: negative (at initial consult)  8/14/2017 :: negative   5/4/2018 :: negative (overdue)  9/7/2018 :: pending    >>Opioid Risk Tool:  12/20/2016 :: 1

## 2018-10-29 ENCOUNTER — TELEPHONE (OUTPATIENT)
Dept: ORTHOPEDICS | Facility: CLINIC | Age: 48
End: 2018-10-29

## 2018-11-16 ENCOUNTER — OFFICE VISIT (OUTPATIENT)
Dept: PAIN MEDICINE | Facility: CLINIC | Age: 48
End: 2018-11-16
Payer: COMMERCIAL

## 2018-11-16 VITALS
BODY MASS INDEX: 27.89 KG/M2 | HEIGHT: 76 IN | DIASTOLIC BLOOD PRESSURE: 70 MMHG | HEART RATE: 73 BPM | SYSTOLIC BLOOD PRESSURE: 124 MMHG | RESPIRATION RATE: 18 BRPM | WEIGHT: 229 LBS

## 2018-11-16 DIAGNOSIS — M47.22 OSTEOARTHRITIS OF SPINE WITH RADICULOPATHY, CERVICAL REGION: Primary | ICD-10-CM

## 2018-11-16 DIAGNOSIS — M54.16 LUMBAR RADICULOPATHY: ICD-10-CM

## 2018-11-16 DIAGNOSIS — M54.2 NECK PAIN: ICD-10-CM

## 2018-11-16 DIAGNOSIS — M47.816 LUMBAR SPONDYLOSIS: ICD-10-CM

## 2018-11-16 DIAGNOSIS — M51.36 DDD (DEGENERATIVE DISC DISEASE), LUMBAR: ICD-10-CM

## 2018-11-16 PROCEDURE — 99214 OFFICE O/P EST MOD 30 MIN: CPT | Mod: S$GLB,,, | Performed by: PHYSICIAN ASSISTANT

## 2018-11-16 PROCEDURE — 3078F DIAST BP <80 MM HG: CPT | Mod: CPTII,S$GLB,, | Performed by: PHYSICIAN ASSISTANT

## 2018-11-16 PROCEDURE — 3074F SYST BP LT 130 MM HG: CPT | Mod: CPTII,S$GLB,, | Performed by: PHYSICIAN ASSISTANT

## 2018-11-16 PROCEDURE — 3008F BODY MASS INDEX DOCD: CPT | Mod: CPTII,S$GLB,, | Performed by: PHYSICIAN ASSISTANT

## 2018-11-16 PROCEDURE — 99999 PR PBB SHADOW E&M-EST. PATIENT-LVL IV: CPT | Mod: PBBFAC,,, | Performed by: PHYSICIAN ASSISTANT

## 2018-11-16 RX ORDER — HYDROCODONE BITARTRATE AND ACETAMINOPHEN 5; 325 MG/1; MG/1
1 TABLET ORAL EVERY 12 HOURS PRN
Qty: 60 TABLET | Refills: 0 | Status: SHIPPED | OUTPATIENT
Start: 2018-11-29 | End: 2018-12-29

## 2018-11-16 NOTE — PROGRESS NOTES
Chief Pain Complaint:  Cervical Spine Pain (C-spine) and Low-back Pain        History of Present Illness:   Cornel Dick is a 48 y.o. male  who is presenting with a chief complaint of low back pain for years. He has been bothered more with neck pain recently. The patient began experiencing this problem insidiously, and the pain has been gradually worsening over the past 5 week(s). The pain is described as throbbing, shooting, burning and electrical and is located in the right cervial spine. Pain is intermittent and lasts hours. The pain radiates to right arm. The patient rates his pain a 8 out of ten and interferes with activities of daily living a 8 out of ten. Pain is exacerbated by flexion of the cervial spine, and is improved by rest. Patient reports no prior trauma, prior lumbar surgery.     - pertinent negatives: No fever, No chills, No weight loss, No bladder dysfunction, No bowel dysfunction, No saddle anesthesia  - pertinent positives: generalized nonspecific Lower Extremity weakness bilaterally    - medications, other therapies tried (physical therapy, injections):     >> gabapentin, zanaflex, Tramadol, norco    >> Has previously undergone Physical Therapy    >> Has previously undergone spinal injection/s:    - Right L3/L4, L5/S1 TF KAVITA on 1-13-17 and 3-10-17 with only weeks of relief after these injections   - [employer] SCS trial on 5-3-17 followed by permanent implantation at [facility] thereafter   - Right C6-7 TFESI on 3/16/18 with 70% pain relief        Imaging / Labs / Studies (reviewed on 11/16/2018):    2/12/18 CT Cervical Spine Without Contrast  Narrative: CT of c-spine without IV contrast  History: Cervicalgia  Technique: Standard cervical spine CT protocol was performed without IV contrast.  Finding: There is grade 1 retrolisthesis C5 on C6. There is no fracture or scoliosis. There is straightening of the normal cervical lordosis. There is mild concentric bulging of the intervertebral disc  between C4 and C5.  Impression:   1. There is grade 1 retrolisthesis C5 on C6.   2. There is straightening of the normal cervical lordosis.   3. There is mild concentric bulging of the intervertebral disc between C4 and C5.          11/16/16 MRI Lumbar Spine Without Contrast     Narrative Technique:  Multiplanar, multisequence MR images were performed of the lumbar spine obtained without contrast.   Comparison: None.   Results:  Lumbar spine alignment is within normal limits. The vertebral body heights are well maintained, with no fracture.  No marrow signal abnormality suspicious for an infiltrative process.    The conus medullaris terminates at approximately the L1-L2 disk space.  The adjacent soft tissue structures show no significant abnormalities.  There is mild disc desiccation at the L3-L4 and L5-S1 discs.  Minimal disc space narrowing noted at these levels.  L1-L2: No significant central canal or neural foraminal narrowing.  L2-L3: No significant central canal or neural foraminal narrowing.  L3-L4: There is a broad-based right foraminal disc extrusion that results in moderate effacement of the exiting L3 nerve root.  No significant central canal narrowing.  L4-L5:  No significant central canal or neural foraminal narrowing.  L5-S1:  Broad-based right paracentral/right foraminal disc protrusion resulting in no significant central canal narrowing.  The right neural foraminal canal is mildly to moderately narrowed.  There is abutment of the exiting L5 nerve root.  No significant effacement of this nerve root.           5/13/16 X-Ray Lumbar Spine Complete 5 View     Narrative Five view lumbar spine.  Findings: Spinal alignment is anatomic.  Mild disc space narrowing and facet arthropathy at L5-S1.  Pedicles appear intact.  No compression fracture or subluxation.         Review of Systems:  CONSTITUTIONAL: patient denies any fever, chills, or weight loss  SKIN: patient denies any rash or itching  RESPIRATORY:  "patient denies having any shortness of breath  GASTROINTESTINAL: patient denies having any diarrhea, constipation, or bowel incontinence  GENITOURINARY: patient denies having any abnormal bladder function    MUSCULOSKELETAL:  - patient complains of the above noted pain/s (see chief pain complaint)    NEUROLOGICAL:   - pain as above  - strength in Upper extremities is intact, BILATERALLY  - sensation in Upper extremities is intact, BILATERALLY  - patient denies any loss of bowel or bladder control      PSYCHIATRIC: patient denies any change in mood    Other:  All other systems reviewed and are negative        Physical Exam:  Vitals:  /70 (BP Location: Right arm, Patient Position: Sitting, BP Method: Medium (Automatic))   Pulse 73   Resp 18   Ht 6' 4" (1.93 m)   Wt 103.9 kg (229 lb)   BMI 27.87 kg/m²   (reviewed on 11/16/2018)    General: alert and oriented, in no apparent distress.  Gait: normal gait.  Skin: no rashes, no discoloration, no obvious lesions  HEENT: normocephalic, atraumatic. Pupils equal and round.  Cardiovascular: no significant peripheral edema present.  Respiratory: without use of accessory muscles of respiration.    Musculoskeletal - Lumbar Spine:  - Pain on flexion of lumbar spine: Absent   - Pain on extension of lumbar spine: Absent   - Lumbar facet loading: Absent   - TTP over the lumbar facet joints: Absent  - TTP over the SI joints:  Absent   - Straight Leg Raise: Negative    Musculoskeletal - Cervical Spine:  - Pain on flexion of cervical spine: Present  - Pain on extension of cervical spine: Absent   - Cervical facet loading: Absent   - TTP over the cervical facet joints: Absent  - Spurling's: Positive on right    Knee:  - Left knee in stabilizing brace    Neuro - Extremities:  - BUE Strength:R/L: D: 5/5; B: 5/5; T: 5/5; WF: 5/5; WE: 5/5; IO: 5/5  - BLE Strength: R/L: HF: 5/5, HE: 5/5, KF: 5/5; KE: 5/5; FE: 5/5; FF: 5/5  - Extremity Reflexes: Brisk and symmetric throughout  - " "Sensory: Sensation to light touch intact bilaterally    Psych:  Mood and affect is appropriate        Assessment:  Cornel Dick is a 48 y.o. year old male who is presenting with     Encounter Diagnoses   Name Primary?    Osteoarthritis of spine with radiculopathy, cervical region Yes    Lumbar spondylosis     Lumbar radiculopathy     DDD (degenerative disc disease), lumbar     Neck pain       Patient returns for follow-up. He has bilateral leg pain (R>L) across dermatomes, primarily extending along the posterior aspects of the legs. He has low back pain as well.  MRI shows DDD at L3/4 and L5/S1 with right side extension and encroachment of nerve roots, NF narrowing, and facet disease as well. There seems to be no indication for left side radicular pain based on the MRI. [place] has [place] [facility] [place] spinal injections (records attached into "Media"). He ultimately had a spinal cord stimulator trial on 5-3-17 with ZOOM TV dual 16 contact percutaneous leads.  He went on to have the SCS permanently implanted at [facility]. This has provided a lot of relief of his lower extremity pain.  He also complains of neck and right arm pain.  He has had great relief after cervical TF KAVITA.       Plan:  1. Interventional: Consider repeat C-KAVITA for returning pain.  If he decides before the next visit, he can call to schedule.     2. Pharmacologic:   - Refill Norco 5/325 mg Po BID PRN (60 tabs) x 1 month. Paper Rx given. Patient tolerating opioids with no side effects, obtaining good pain control with functional improvement.  We have discussed the risks of chronic opioid medication management.   - Refill gabapentin 800 mg PO TID.  Continue Tizanidine 4 mg Po BID PRN.   - Opioid contract signed on 2-6-18.     - LA  reviewed and appropriate. Last filled on 8-30-18. LA  was clearly not updated. Patient states filled from [facility] in early November.  - Will order " UDS today to ensure medication compliance.  Last UDS was negative. Filled on 8/30 with UDS on 9/7. He is unsure why he'd be negative. He states he skips doses all together on some days and then takes more on other days because he only takes as needed.  He was informed today that any future violations would result in discontinuation of medications.  UDS today should be appropriate as he took it this morning and day before yesterday.      3. Rehabilitative: Encouraged regular exercise.      4. Diagnostic: None.    5. Follow up: 4 weeks for med refill.    - I discussed the risks, benefits, and alternatives to potential treatment options. All questions and concerns were fully addressed today in clinic. Dr. Benton was consulted regarding the patient plan and agrees.         >>UDS:  12/20/2016 :: negative (at initial consult)  8/14/2017 :: negative   5/4/2018 :: negative (overdue)  9/7/2018 :: pending    >>Opioid Risk Tool:  12/20/2016 :: 1

## 2018-11-30 ENCOUNTER — PATIENT MESSAGE (OUTPATIENT)
Dept: PAIN MEDICINE | Facility: CLINIC | Age: 48
End: 2018-11-30

## 2018-12-07 ENCOUNTER — TELEPHONE (OUTPATIENT)
Dept: PAIN MEDICINE | Facility: CLINIC | Age: 48
End: 2018-12-07

## 2018-12-07 NOTE — TELEPHONE ENCOUNTER
----- Message from Kailey Thomas sent at 12/7/2018 11:48 AM CST -----  Contact: pt   Pt was returning nurse call    ..[phone]

## 2018-12-07 NOTE — TELEPHONE ENCOUNTER
Pt to come in today for pill count per Magda Bynum/. Attempted pt x2 at 0903am. Voicemailbox not set up. Attempted pt again x2 at 0928. Voicemail box not set up on mobile number. Called home number and left message with spouse. Pt now calling back. Informed pt that he is up for pill count so Dr. benton would like to see him today before the end of the day at 320pm. Pt states he can not come in. Informed pt that per his pain contract, he is to come for pill count day of when he is called. Pt states he was unaware. Informed pt I will let Dr. Benton and Magda know. All questions answered.//lp

## 2018-12-14 ENCOUNTER — OFFICE VISIT (OUTPATIENT)
Dept: PAIN MEDICINE | Facility: CLINIC | Age: 48
End: 2018-12-14
Payer: COMMERCIAL

## 2018-12-14 VITALS
DIASTOLIC BLOOD PRESSURE: 88 MMHG | WEIGHT: 229 LBS | HEIGHT: 76 IN | BODY MASS INDEX: 27.89 KG/M2 | HEART RATE: 67 BPM | SYSTOLIC BLOOD PRESSURE: 148 MMHG | RESPIRATION RATE: 18 BRPM

## 2018-12-14 DIAGNOSIS — M54.16 LUMBAR RADICULOPATHY: ICD-10-CM

## 2018-12-14 DIAGNOSIS — M51.36 DDD (DEGENERATIVE DISC DISEASE), LUMBAR: ICD-10-CM

## 2018-12-14 DIAGNOSIS — M54.12 CERVICAL RADICULOPATHY: ICD-10-CM

## 2018-12-14 DIAGNOSIS — M47.816 LUMBAR SPONDYLOSIS: ICD-10-CM

## 2018-12-14 DIAGNOSIS — M47.22 OSTEOARTHRITIS OF SPINE WITH RADICULOPATHY, CERVICAL REGION: Primary | ICD-10-CM

## 2018-12-14 PROCEDURE — 99214 OFFICE O/P EST MOD 30 MIN: CPT | Mod: S$GLB,,, | Performed by: PHYSICIAN ASSISTANT

## 2018-12-14 PROCEDURE — 99999 PR PBB SHADOW E&M-EST. PATIENT-LVL IV: CPT | Mod: PBBFAC,,, | Performed by: PHYSICIAN ASSISTANT

## 2018-12-14 PROCEDURE — 3077F SYST BP >= 140 MM HG: CPT | Mod: CPTII,S$GLB,, | Performed by: PHYSICIAN ASSISTANT

## 2018-12-14 PROCEDURE — 3008F BODY MASS INDEX DOCD: CPT | Mod: CPTII,S$GLB,, | Performed by: PHYSICIAN ASSISTANT

## 2018-12-14 PROCEDURE — 3079F DIAST BP 80-89 MM HG: CPT | Mod: CPTII,S$GLB,, | Performed by: PHYSICIAN ASSISTANT

## 2018-12-14 NOTE — PROGRESS NOTES
Chief Pain Complaint:  Cervical Spine Pain (C-spine)        History of Present Illness:   Cornel Dick is a 48 y.o. male  who is presenting with a chief complaint of low back pain for years. He has been bothered more with neck pain recently. The patient began experiencing this problem insidiously, and the pain has been gradually worsening over the past 5 week(s). The pain is described as throbbing, shooting, burning and electrical and is located in the right cervial spine. Pain is intermittent and lasts hours. The pain radiates to right arm. The patient rates his pain a 8 out of ten and interferes with activities of daily living a 8 out of ten. Pain is exacerbated by flexion of the cervial spine, and is improved by rest. Patient reports no prior trauma, prior lumbar surgery.     - pertinent negatives: No fever, No chills, No weight loss, No bladder dysfunction, No bowel dysfunction, No saddle anesthesia  - pertinent positives: generalized nonspecific Lower Extremity weakness bilaterally    - medications, other therapies tried (physical therapy, injections):     >> gabapentin, zanaflex, Tramadol, norco    >> Has previously undergone Physical Therapy    >> Has previously undergone spinal injection/s:    - Right L3/L4, L5/S1 TF KAVITA on 1-13-17 and 3-10-17 with only weeks of relief after these injections   - [employer] SCS trial on 5-3-17 followed by permanent implantation at [facility] [place] thereafter   - Right C6-7 TFESI on 3/16/18 with 70% pain relief        Imaging / Labs / Studies (reviewed on 12/14/2018):    2/12/18 CT Cervical Spine Without Contrast  Narrative: CT of c-spine without IV contrast  History: Cervicalgia  Technique: Standard cervical spine CT protocol was performed without IV contrast.  Finding: There is grade 1 retrolisthesis C5 on C6. There is no fracture or scoliosis. There is straightening of the normal cervical lordosis. There is mild concentric bulging of the intervertebral disc between C4 and  C5.  Impression:   1. There is grade 1 retrolisthesis C5 on C6.   2. There is straightening of the normal cervical lordosis.   3. There is mild concentric bulging of the intervertebral disc between C4 and C5.          11/16/16 MRI Lumbar Spine Without Contrast     Narrative Technique:  Multiplanar, multisequence MR images were performed of the lumbar spine obtained without contrast.   Comparison: None.   Results:  Lumbar spine alignment is within normal limits. The vertebral body heights are well maintained, with no fracture.  No marrow signal abnormality suspicious for an infiltrative process.    The conus medullaris terminates at approximately the L1-L2 disk space.  The adjacent soft tissue structures show no significant abnormalities.  There is mild disc desiccation at the L3-L4 and L5-S1 discs.  Minimal disc space narrowing noted at these levels.  L1-L2: No significant central canal or neural foraminal narrowing.  L2-L3: No significant central canal or neural foraminal narrowing.  L3-L4: There is a broad-based right foraminal disc extrusion that results in moderate effacement of the exiting L3 nerve root.  No significant central canal narrowing.  L4-L5:  No significant central canal or neural foraminal narrowing.  L5-S1:  Broad-based right paracentral/right foraminal disc protrusion resulting in no significant central canal narrowing.  The right neural foraminal canal is mildly to moderately narrowed.  There is abutment of the exiting L5 nerve root.  No significant effacement of this nerve root.           5/13/16 X-Ray Lumbar Spine Complete 5 View     Narrative Five view lumbar spine.  Findings: Spinal alignment is anatomic.  Mild disc space narrowing and facet arthropathy at L5-S1.  Pedicles appear intact.  No compression fracture or subluxation.         Review of Systems:  CONSTITUTIONAL: patient denies any fever, chills, or weight loss  SKIN: patient denies any rash or itching  RESPIRATORY: patient denies  "having any shortness of breath  GASTROINTESTINAL: patient denies having any diarrhea, constipation, or bowel incontinence  GENITOURINARY: patient denies having any abnormal bladder function    MUSCULOSKELETAL:  - patient complains of the above noted pain/s (see chief pain complaint)    NEUROLOGICAL:   - pain as above  - strength in Upper extremities is intact, BILATERALLY  - sensation in Upper extremities is intact, BILATERALLY  - patient denies any loss of bowel or bladder control      PSYCHIATRIC: patient denies any change in mood    Other:  All other systems reviewed and are negative        Physical Exam:  Vitals:  BP (!) 148/88 (BP Location: Right arm, Patient Position: Sitting, BP Method: Medium (Automatic))   Pulse 67   Resp 18   Ht 6' 4" (1.93 m)   Wt 103.9 kg (229 lb)   BMI 27.87 kg/m²   (reviewed on 12/14/2018)    General: alert and oriented, in no apparent distress.  Gait: normal gait.  Skin: no rashes, no discoloration, no obvious lesions  HEENT: normocephalic, atraumatic. Pupils equal and round.  Cardiovascular: no significant peripheral edema present.  Respiratory: without use of accessory muscles of respiration.    Musculoskeletal - Lumbar Spine:  - Pain on flexion of lumbar spine: Absent   - Pain on extension of lumbar spine: Absent   - Lumbar facet loading: Absent   - TTP over the lumbar facet joints: Absent  - TTP over the SI joints:  Absent   - Straight Leg Raise: Negative    Musculoskeletal - Cervical Spine:  - Pain on flexion of cervical spine: Present  - Pain on extension of cervical spine: Absent   - Cervical facet loading: Absent   - TTP over the cervical facet joints: Absent  - Spurling's: Positive on right    Knee:  - Left knee in stabilizing brace    Neuro - Extremities:  - BUE Strength:R/L: D: 5/5; B: 5/5; T: 5/5; WF: 5/5; WE: 5/5; IO: 5/5  - BLE Strength: R/L: HF: 5/5, HE: 5/5, KF: 5/5; KE: 5/5; FE: 5/5; FF: 5/5  - Extremity Reflexes: Brisk and symmetric throughout  - Sensory: " "Sensation to light touch intact bilaterally    Psych:  Mood and affect is appropriate        Assessment:  Cornel Dick is a 48 y.o. year old male who is presenting with     Encounter Diagnoses   Name Primary?    Osteoarthritis of spine with radiculopathy, cervical region Yes    Lumbar spondylosis     DDD (degenerative disc disease), lumbar     Lumbar radiculopathy     Cervical radiculopathy       Patient returns for follow-up. He has bilateral leg pain (R>L) across dermatomes, primarily extending along the posterior aspects of the legs. He has low back pain as well.  MRI shows DDD at L3/4 and L5/S1 with right side extension and encroachment of nerve roots, NF narrowing, and facet disease as well. There seems to be no indication for left side radicular pain based on the MRI. [place] [facility] [place] spinal injections (records attached into "Media"). He ultimately had a spinal cord stimulator trial on 5-3-17 with Cog[employer] dual 16 contact percutaneous leads.  He went on to have the SCS permanently implanted at the [facility]. This has provided a lot of relief of his lower extremity pain.  He also complains of neck and right arm pain.  He has had great relief after cervical TF KAVITA.       Plan:  1. Interventional: Consider repeat C-KAVITA for returning pain.       2. Pharmacologic:   - Refill Norco 5/325 mg Po BID PRN (60 tabs) x 1 month. Will send in electronically. Patient tolerating opioids with no side effects, obtaining good pain control with functional improvement.  We have discussed the risks of chronic opioid medication management.   - Refill gabapentin 800 mg PO TID.  Continue Tizanidine 4 mg Po BID PRN.   - Opioid contract signed on 2-6-18.     - LA  reviewed and appropriate.  shows last 2 fill dates to be on 8-30-18 & now 12-7-18. Patient reports last fill date to be 11-16-18.  He has paperwork mailed to him from [facility] today showing this " fill date.  There seems to be several discrepancies with this situation. [facility] in [place] was called after last visit and again today, and they still report that those fill dates from [facility] are accurate.   - Will order UDS today to ensure medication compliance.    ·  Last UDS was negative (Filled on 8/30 with UDS on 9/7), and he was unsure why he'd be negative. He states he skips doses all together on some days and then takes more on other days because he only takes as needed.  He was informed today that any future violations would result in discontinuation of medications.    · UDS from last visit 11-16-18 was only +hydrocodone with no metabolites.  It seems that all metabolites should have shown because he reported taking that morning and 2 days before appt.    - Also, he was called in for pill count on Friday 12/7/18 and did not come in because he reports he was in [place] bringing his daughter to a doctor's appt.     3. Rehabilitative: Encouraged regular exercise.      4. Diagnostic: None.    5. Follow up:     - I discussed the risks, benefits, and alternatives to potential treatment options. All questions and concerns were fully addressed today in clinic. Dr. Benton was consulted regarding the patient plan and agrees.         >>UDS:  12/20/2016 :: negative (at initial consult)  8/14/2017 :: negative   5/4/2018 :: negative (overdue)  9/7/2018 :: +hydrocodone only with no metabolites  12/14/2018 :: pending    >>Opioid Risk Tool:  12/20/2016 :: 1

## 2018-12-20 RX ORDER — HYDROCODONE BITARTRATE AND ACETAMINOPHEN 5; 325 MG/1; MG/1
1 TABLET ORAL EVERY 12 HOURS PRN
Qty: 60 TABLET | Refills: 0 | Status: SHIPPED | OUTPATIENT
Start: 2018-12-29 | End: 2019-01-28

## 2018-12-31 DIAGNOSIS — M25.562 PAIN IN BOTH KNEES, UNSPECIFIED CHRONICITY: Primary | ICD-10-CM

## 2018-12-31 DIAGNOSIS — R52 PAIN: Primary | ICD-10-CM

## 2018-12-31 DIAGNOSIS — M25.561 PAIN IN BOTH KNEES, UNSPECIFIED CHRONICITY: Primary | ICD-10-CM

## 2019-01-02 ENCOUNTER — HOSPITAL ENCOUNTER (OUTPATIENT)
Dept: RADIOLOGY | Facility: HOSPITAL | Age: 49
Discharge: HOME | End: 2019-01-02
Attending: ORTHOPAEDIC SURGERY
Payer: COMMERCIAL

## 2019-01-02 ENCOUNTER — OFFICE VISIT (OUTPATIENT)
Dept: ORTHOPEDICS | Facility: CLINIC | Age: 49
End: 2019-01-02
Payer: COMMERCIAL

## 2019-01-02 VITALS
SYSTOLIC BLOOD PRESSURE: 114 MMHG | HEART RATE: 64 BPM | BODY MASS INDEX: 27.89 KG/M2 | DIASTOLIC BLOOD PRESSURE: 72 MMHG | HEIGHT: 76 IN | WEIGHT: 229 LBS

## 2019-01-02 DIAGNOSIS — R52 PAIN: ICD-10-CM

## 2019-01-02 DIAGNOSIS — M21.162 ACQUIRED VARUS DEFORMITY KNEE, LEFT: ICD-10-CM

## 2019-01-02 DIAGNOSIS — M70.61 GREATER TROCHANTERIC BURSITIS OF RIGHT HIP: ICD-10-CM

## 2019-01-02 DIAGNOSIS — M17.12 PRIMARY OSTEOARTHRITIS OF LEFT KNEE: Primary | ICD-10-CM

## 2019-01-02 PROCEDURE — 97110 THERAPEUTIC EXERCISES: CPT | Mod: GP,S$GLB,, | Performed by: ORTHOPAEDIC SURGERY

## 2019-01-02 PROCEDURE — 3074F SYST BP LT 130 MM HG: CPT | Mod: CPTII,S$GLB,, | Performed by: ORTHOPAEDIC SURGERY

## 2019-01-02 PROCEDURE — 99214 OFFICE O/P EST MOD 30 MIN: CPT | Mod: 57,25,S$GLB, | Performed by: ORTHOPAEDIC SURGERY

## 2019-01-02 PROCEDURE — 3078F DIAST BP <80 MM HG: CPT | Mod: CPTII,S$GLB,, | Performed by: ORTHOPAEDIC SURGERY

## 2019-01-02 PROCEDURE — 20610 DRAIN/INJ JOINT/BURSA W/O US: CPT | Mod: LT,S$GLB,, | Performed by: ORTHOPAEDIC SURGERY

## 2019-01-02 PROCEDURE — 77073 BONE LENGTH STUDIES: CPT | Mod: TC,FY,PO

## 2019-01-02 PROCEDURE — 99214 PR OFFICE/OUTPT VISIT, EST, LEVL IV, 30-39 MIN: ICD-10-PCS | Mod: 57,25,S$GLB, | Performed by: ORTHOPAEDIC SURGERY

## 2019-01-02 PROCEDURE — 99999 PR PBB SHADOW E&M-EST. PATIENT-LVL IV: CPT | Mod: PBBFAC,,, | Performed by: ORTHOPAEDIC SURGERY

## 2019-01-02 PROCEDURE — 97110 PR THERAPEUTIC EXERCISES: ICD-10-PCS | Mod: GP,S$GLB,, | Performed by: ORTHOPAEDIC SURGERY

## 2019-01-02 PROCEDURE — 77073 XR HIP TO ANKLE: ICD-10-PCS | Mod: 26,,, | Performed by: RADIOLOGY

## 2019-01-02 PROCEDURE — 3008F BODY MASS INDEX DOCD: CPT | Mod: CPTII,S$GLB,, | Performed by: ORTHOPAEDIC SURGERY

## 2019-01-02 PROCEDURE — 3008F PR BODY MASS INDEX (BMI) DOCUMENTED: ICD-10-PCS | Mod: CPTII,S$GLB,, | Performed by: ORTHOPAEDIC SURGERY

## 2019-01-02 PROCEDURE — 20610 LARGE JOINT ASPIRATION/INJECTION: L KNEE: ICD-10-PCS | Mod: LT,S$GLB,, | Performed by: ORTHOPAEDIC SURGERY

## 2019-01-02 PROCEDURE — 77073 BONE LENGTH STUDIES: CPT | Mod: 26,,, | Performed by: RADIOLOGY

## 2019-01-02 PROCEDURE — 3074F PR MOST RECENT SYSTOLIC BLOOD PRESSURE < 130 MM HG: ICD-10-PCS | Mod: CPTII,S$GLB,, | Performed by: ORTHOPAEDIC SURGERY

## 2019-01-02 PROCEDURE — 99999 PR PBB SHADOW E&M-EST. PATIENT-LVL IV: ICD-10-PCS | Mod: PBBFAC,,, | Performed by: ORTHOPAEDIC SURGERY

## 2019-01-02 PROCEDURE — 3078F PR MOST RECENT DIASTOLIC BLOOD PRESSURE < 80 MM HG: ICD-10-PCS | Mod: CPTII,S$GLB,, | Performed by: ORTHOPAEDIC SURGERY

## 2019-01-02 RX ADMIN — METHYLPREDNISOLONE ACETATE 80 MG: 80 INJECTION, SUSPENSION INTRA-ARTICULAR; INTRALESIONAL; INTRAMUSCULAR; SOFT TISSUE at 11:01

## 2019-01-02 NOTE — PROGRESS NOTES
Subjective:     Patient ID: Cornel Dick is a 48 y.o. male.    Chief Complaint: Pain of the Left Knee and Pain of the Right Hip    Left knee pain and right hip pain. Right hip a little worse than knee even. Lateral based pain mostly to hip.     MUB L knee helping still.        Knee Pain    The pain is present in the left knee and right hip. The pain radiates to the left foot, groin and left thigh. This is a chronic problem. The current episode started more than 1 year ago. There has been no history of extremity trauma. Movement associated with injury: no injury.The problem occurs daily. The problem has been unchanged. The quality of the pain is described as aching, sharp and shooting. The pain is at a severity of 7/10. Associated symptoms include numbness and stiffness. Pertinent negatives include no fever or itching. The symptoms are aggravated by activity and bearing weight. He has tried injection treatment and oral narcotics (Left Knee ATS) for the symptoms. The treatment provided mild relief. Physical therapy was effective.      Past Medical History:   Diagnosis Date    Anemia 2/21/2018    Arthritis     Benign essential hypertension 2/21/2018    Drug-induced erectile dysfunction 2/21/2018    HBP (high blood pressure)     Renal insufficiency 2/21/2018     Past Surgical History:   Procedure Laterality Date    back stimulator      KNEE ARTHROSCOPY Left     TRIAL-SPINAL CORD STIMULATOR N/A 5/3/2017    Performed by Greg Puente MD at [facility] OR     Family History   Problem Relation Age of Onset    Heart disease Father      Social History     Socioeconomic History    Marital status:      Spouse name: Not on file    Number of children: Not on file    Years of education: Not on file    Highest education level: Not on file   Social Needs    Financial resource strain: Not on file    Food insecurity - worry: Not on file    Food insecurity - inability: Not on file    Transportation needs  - medical: Not on file    Transportation needs - non-medical: Not on file   Occupational History    Not on file   Tobacco Use    Smoking status: Never Smoker    Smokeless tobacco: Never Used   Substance and Sexual Activity    Alcohol use: No     Alcohol/week: 0.0 oz    Drug use: No    Sexual activity: Yes     Partners: Female   Other Topics Concern    Not on file   Social History Narrative    Not on file        Medication List           Accurate as of 1/2/19 11:59 PM. If you have any questions, ask your nurse or doctor.               CONTINUE taking these medications    amLODIPine 10 MG tablet  Commonly known as:  NORVASC     ARTHRITIS PAIN RELIEVER ORAL     baicalin-catechin 500 mg Cap  Commonly known as:  LIMBREL  Take 500 mg by mouth 2 (two) times daily.     celecoxib 200 MG capsule  Commonly known as:  CeleBREX  Take 1 capsule (200 mg total) by mouth 2 (two) times daily as needed for Pain.     efinaconazole 10 % Sana  Commonly known as:  JUBLIA  Apply 1 application topically once daily.     gabapentin 800 MG tablet  Commonly known as:  NEURONTIN  Take 1 tablet (800 mg total) by mouth 3 (three) times daily.     hydrALAZINE 100 MG tablet  Commonly known as:  APRESOLINE     HYDROcodone-acetaminophen 5-325 mg per tablet  Commonly known as:  NORCO  Take 1 tablet by mouth every 12 (twelve) hours as needed for Pain.     lisinopril-hydrochlorothiazide 20-12.5 mg per tablet  Commonly known as:  PRINZIDE,ZESTORETIC     HUBERT MULTIVITAMIN FOR MEN ORAL     mupirocin 2 % ointment  Commonly known as:  BACTROBAN  Apply topically 3 (three) times daily.     oxaprozin 600 mg tablet  Commonly known as:  DAYPRO     sildenafil 20 mg Tab  Commonly known as:  REVATIO     simvastatin 20 MG tablet  Commonly known as:  ZOCOR  Take 1 tablet (20 mg total) by mouth every evening. - for heart health     tiZANidine 4 MG tablet  Commonly known as:  ZANAFLEX  TAKE ONE TABLET BY MOUTH TWICE DAILY AS NEEDED     TRIUMEQ 600- mg  Tab  Generic drug:  abacavir-dolutegravir-lamivud          Review of patient's allergies indicates:   Allergen Reactions    Chocolate flavor Swelling     Review of Systems   Constitution: Negative for fever.   HENT: Negative for sore throat.    Eyes: Negative for blurred vision.   Cardiovascular: Negative for dyspnea on exertion.   Respiratory: Negative for shortness of breath.    Hematologic/Lymphatic: Does not bruise/bleed easily.   Skin: Negative for itching.   Musculoskeletal: Positive for arthritis, back pain, joint pain, joint swelling, muscle cramps, muscle weakness, neck pain and stiffness. Negative for falls.   Gastrointestinal: Negative for vomiting.   Genitourinary: Negative for dysuria.   Neurological: Positive for numbness and paresthesias. Negative for dizziness and loss of balance.   Psychiatric/Behavioral: The patient does not have insomnia.        Objective:   Body mass index is 27.87 kg/m².  Vitals:    01/02/19 1318   BP: 114/72   Pulse: 64             General    Nursing note and vitals reviewed.  Constitutional: He is oriented to person, place, and time. He appears well-developed. No distress.   HENT:   Head: Normocephalic and atraumatic.   Eyes: EOM are normal.   Neck: Neck supple.   Cardiovascular: Normal rate.    Pulmonary/Chest: Effort normal.   Neurological: He is alert and oriented to person, place, and time.   Psychiatric: He has a normal mood and affect. His behavior is normal.     General Musculoskeletal Exam   Gait: antalgic       Right Knee Exam     Comments:  Exam of the right knee, unchanged, demonstrates no noticeable effusion.  Range of motions from 0-120° with no pain.  Hamstrings are well stretched.  Quadriceps tone is good.      Left Knee Exam     Tenderness   The patient tender to palpation of the medial joint line.    Comments:  Examination with standing demonstrates mild varus deformity of the left leg.  With ambulation he has no varus thrust.  He has a antalgic gait.  Exam  of the left knee, unchanged, the left knee demonstrates moderate effusion.  He does have palpation over the medial joint line middle one third and posterior one third markedly.  Mildly tender to palpation over lateral joint line.  Tender to palpation over the border of the patella.  He does have full extension and can flex to 110.  Crepitus noted to the patellofemoral joint with active and passive range of motion.  He is stable anterior drawer, stable locking, stable posterior drawer, stable varus valgus stress at 0 and 30°.  His varus does seem correctable at 30° with manual correction.  Mariana's was not tested today.  Distally he has sensation light touch intact to 2+ DP.  Pulse intact plantar flexion dorsiflexion tib ant and flexor hallucis longus.      His hip is stiff with perhaps 5° of internal rotation and 10° of external rotation.  I can flex to 90 but again a great deal of hip stiffness.  Quadriceps tone is mildly decreased.    Right Hip Exam     Tenderness   The patient tender to palpation of the trochanteric bursa.    Range of Motion   Abduction: 20   Flexion: 90   External rotation: 30   Internal rotation: 5     Tests   Pain w/ forced internal rotation (MAEVE): present  Pain w/ forced external rotation (FADIR): absent  Log Roll: negative    Other   Sensation: normal      Right Hand/Wrist Exam     Tests   Finkelstein's test: negative  Carpal Tunnel Compression Test: negative  Cubital Tunnel Compression Test: negative      Other     Neuorologic Exam    Median Distribution: normal  Ulnar Distribution: normal  Radial Distribution: normal      Right Elbow Exam     Range of Motion   Extension: 0   Flexion: 140     Tests   Tinel's sign (cubital tunnel): negative    Other   Sensation: normal    Comments:  Triceps 4/5  Biceps -5/5   + Right spurlings    Minimally tender over biceps tendon and lateral epicondyle          Muscle Strength   Right Upper Extremity   Wrist flexion: 4/5/5   : 4/5/5     Vascular  Exam       Capillary Refill  Right Hand: normal capillary refill    Edema  Right Forearm: absent    Radiographs / Imaging : Results reviewed by me and interpreted by me, discussed with the patient and / or family     Hip to ankle films reviewed showing LLE varus, right hip partially visualized but artifact is overlying hip joint.       Assessment:     Encounter Diagnoses   Name Primary?    Primary osteoarthritis of left knee Yes    Acquired varus deformity knee, left     Greater trochanteric bursitis of right hip         Plan:     1. Continue L knee medial  brace. We will hold on TKA for now for as long as he would like    Try CSI L knee for pain relief, discussed pros and cons, risks and benefits and he would like to proceed today    HEP 72195 - I, instructed and demonstrated a R hip trochanteric bursitis stretching HEP. The patient then demonstrated understanding of exercises and proper technique. This program was performed for 15 minutes.     3. RTC in 2 months for recheck

## 2019-01-03 ENCOUNTER — TELEPHONE (OUTPATIENT)
Dept: PAIN MEDICINE | Facility: CLINIC | Age: 49
End: 2019-01-03

## 2019-01-13 RX ORDER — METHYLPREDNISOLONE ACETATE 80 MG/ML
80 INJECTION, SUSPENSION INTRA-ARTICULAR; INTRALESIONAL; INTRAMUSCULAR; SOFT TISSUE
Status: DISCONTINUED | OUTPATIENT
Start: 2019-01-02 | End: 2019-01-13 | Stop reason: HOSPADM

## 2019-01-13 NOTE — PROCEDURES
Large Joint Aspiration/Injection: L knee  Date/Time: 1/2/2019 11:56 AM  Performed by: Ford Alcantara MD  Authorized by: Ford Alcantara MD     Consent Done?:  Yes (Verbal)  Indications:  Pain and diagnostic evaluation  Procedure site marked: Yes    Timeout: Prior to procedure the correct patient, procedure, and site was verified      Location:  Knee  Site:  L knee  Prep: Patient was prepped and draped in usual sterile fashion    Needle size:  22 G  Approach:  Anterolateral  Medications:  80 mg methylPREDNISolone acetate 80 mg/mL  Patient tolerance:  Patient tolerated the procedure well with no immediate complications    Additional Comments: The patient had no adverse reactions to the medication. The patient was instructed to apply ice to the joint for 30 minutes on and 30 minutes off for the next 48 hours, and avoid strenuous activities for 48 hours following the injection. Patient was warned of possible blood sugar and/or blood pressure changes during that time regarding corticosteroid injections if applicable.  Following that time, patient can resume regular activities. Note that informed consent was performed with the patient before injection discussing risks, benefits, indications and alternatives to injection, risks including but not limited to bleeding and infection.

## 2019-02-01 ENCOUNTER — TELEPHONE (OUTPATIENT)
Dept: ORTHOPEDICS | Facility: CLINIC | Age: 49
End: 2019-02-01

## 2019-02-01 NOTE — TELEPHONE ENCOUNTER
Called pt to move his apt from [facility] to [facility] due to provider being at the [facility] location. Pt understood.n

## 2019-03-04 ENCOUNTER — OFFICE VISIT (OUTPATIENT)
Dept: ORTHOPEDICS | Facility: CLINIC | Age: 49
End: 2019-03-04
Payer: COMMERCIAL

## 2019-03-04 VITALS
WEIGHT: 229 LBS | DIASTOLIC BLOOD PRESSURE: 69 MMHG | RESPIRATION RATE: 18 BRPM | SYSTOLIC BLOOD PRESSURE: 124 MMHG | HEIGHT: 76 IN | HEART RATE: 69 BPM | BODY MASS INDEX: 27.89 KG/M2

## 2019-03-04 DIAGNOSIS — M17.12 PRIMARY OSTEOARTHRITIS OF LEFT KNEE: ICD-10-CM

## 2019-03-04 DIAGNOSIS — M70.61 GREATER TROCHANTERIC BURSITIS OF RIGHT HIP: Primary | ICD-10-CM

## 2019-03-04 DIAGNOSIS — M21.162 ACQUIRED VARUS DEFORMITY KNEE, LEFT: ICD-10-CM

## 2019-03-04 PROCEDURE — 99214 OFFICE O/P EST MOD 30 MIN: CPT | Mod: 25,S$GLB,, | Performed by: ORTHOPAEDIC SURGERY

## 2019-03-04 PROCEDURE — 99999 PR PBB SHADOW E&M-EST. PATIENT-LVL IV: CPT | Mod: PBBFAC,,, | Performed by: ORTHOPAEDIC SURGERY

## 2019-03-04 PROCEDURE — 3008F BODY MASS INDEX DOCD: CPT | Mod: CPTII,S$GLB,, | Performed by: ORTHOPAEDIC SURGERY

## 2019-03-04 PROCEDURE — 20610 LARGE JOINT ASPIRATION/INJECTION: R GREATER TROCHANTERIC BURSA: ICD-10-PCS | Mod: RT,S$GLB,, | Performed by: ORTHOPAEDIC SURGERY

## 2019-03-04 PROCEDURE — 3074F SYST BP LT 130 MM HG: CPT | Mod: CPTII,S$GLB,, | Performed by: ORTHOPAEDIC SURGERY

## 2019-03-04 PROCEDURE — 3074F PR MOST RECENT SYSTOLIC BLOOD PRESSURE < 130 MM HG: ICD-10-PCS | Mod: CPTII,S$GLB,, | Performed by: ORTHOPAEDIC SURGERY

## 2019-03-04 PROCEDURE — 3008F PR BODY MASS INDEX (BMI) DOCUMENTED: ICD-10-PCS | Mod: CPTII,S$GLB,, | Performed by: ORTHOPAEDIC SURGERY

## 2019-03-04 PROCEDURE — 3078F PR MOST RECENT DIASTOLIC BLOOD PRESSURE < 80 MM HG: ICD-10-PCS | Mod: CPTII,S$GLB,, | Performed by: ORTHOPAEDIC SURGERY

## 2019-03-04 PROCEDURE — 99999 PR PBB SHADOW E&M-EST. PATIENT-LVL IV: ICD-10-PCS | Mod: PBBFAC,,, | Performed by: ORTHOPAEDIC SURGERY

## 2019-03-04 PROCEDURE — 99214 PR OFFICE/OUTPT VISIT, EST, LEVL IV, 30-39 MIN: ICD-10-PCS | Mod: 25,S$GLB,, | Performed by: ORTHOPAEDIC SURGERY

## 2019-03-04 PROCEDURE — 3078F DIAST BP <80 MM HG: CPT | Mod: CPTII,S$GLB,, | Performed by: ORTHOPAEDIC SURGERY

## 2019-03-04 PROCEDURE — 20610 DRAIN/INJ JOINT/BURSA W/O US: CPT | Mod: RT,S$GLB,, | Performed by: ORTHOPAEDIC SURGERY

## 2019-03-04 RX ORDER — ROSUVASTATIN CALCIUM 10 MG/1
10 TABLET, COATED ORAL
COMMUNITY

## 2019-03-04 RX ORDER — METHYLPREDNISOLONE ACETATE 80 MG/ML
80 INJECTION, SUSPENSION INTRA-ARTICULAR; INTRALESIONAL; INTRAMUSCULAR; SOFT TISSUE
Status: DISCONTINUED | OUTPATIENT
Start: 2019-03-04 | End: 2019-03-04 | Stop reason: HOSPADM

## 2019-03-04 RX ADMIN — METHYLPREDNISOLONE ACETATE 80 MG: 80 INJECTION, SUSPENSION INTRA-ARTICULAR; INTRALESIONAL; INTRAMUSCULAR; SOFT TISSUE at 11:03

## 2019-03-04 NOTE — PROCEDURES
Large Joint Aspiration/Injection: R greater trochanteric bursa  Date/Time: 3/4/2019 11:08 AM  Performed by: Ford Alcantara MD  Authorized by: Ford Alcantara MD     Consent Done?:  Yes (Verbal)  Indications:  Pain and diagnostic evaluation  Procedure site marked: Yes    Timeout: Prior to procedure the correct patient, procedure, and site was verified      Location:  Hip  Site:  R greater trochanteric bursa  Prep: Patient was prepped and draped in usual sterile fashion    Ultrasonic Guidance for needle placement: No  Needle size:  21 G  Approach:  Lateral  Medications:  80 mg methylPREDNISolone acetate 80 mg/mL  Patient tolerance:  Patient tolerated the procedure well with no immediate complications    Additional Comments: The patient had no adverse reactions to the medication. The patient was instructed to apply ice to the joint for 30 minutes on and 30 minutes off for the next 48 hours, and avoid strenuous activities for 48 hours following the injection. Patient was warned of possible blood sugar and/or blood pressure changes during that time regarding corticosteroid injections if applicable.  Following that time, patient can resume regular activities. Note that informed consent was performed with the patient before injection discussing risks, benefits, indications and alternatives to injection, risks including but not limited to bleeding and infection.

## 2019-03-04 NOTE — PROGRESS NOTES
"Subjective:     Patient ID: Cornel Dick is a 48 y.o. male.    Chief Complaint: Pain of the Left Knee    Left knee pain and right hip pain. Right hip a little worse than knee even. Lateral based pain mostly to hip. Has spinal cord stimulator and lower back pain, but this is "different" - pain that is lateral to the hip and stays lateral, does not radiate - we have previously diagnosed with trochanteric bursitis and he had stretches last time.  Feels like the structures are helping somewhat.    Left knee corticosteroid injection from January 2, 2019 is still helping somewhat, left knee medial  brace is really helping.  He gets good relief with this.  Very happy with treatment thus far    He has a wedding for his daughter coming up in the next month.      Knee Pain    The pain is present in the left knee and right hip. The pain radiates to the left foot, groin and left thigh. This is a chronic problem. The current episode started more than 1 year ago. There has been no history of extremity trauma. Movement associated with injury: no injury.The problem occurs daily. The problem has been unchanged. The quality of the pain is described as aching, sharp and shooting. The pain is at a severity of 8/10. Associated symptoms include numbness and stiffness. Pertinent negatives include no fever or itching. The symptoms are aggravated by activity and bearing weight. He has tried injection treatment and oral narcotics (Left Knee ATS) for the symptoms. The treatment provided mild relief. Physical therapy was effective.      Past Medical History:   Diagnosis Date    Anemia 2/21/2018    Arthritis     Benign essential hypertension 2/21/2018    Drug-induced erectile dysfunction 2/21/2018    HBP (high blood pressure)     Renal insufficiency 2/21/2018     Past Surgical History:   Procedure Laterality Date    back stimulator      KNEE ARTHROSCOPY Left     TRIAL-SPINAL CORD STIMULATOR N/A 5/3/2017    Performed " by Greg Puente MD at [facility] OR     Family History   Problem Relation Age of Onset    Heart disease Father      Social History     Socioeconomic History    Marital status:      Spouse name: Not on file    Number of children: Not on file    Years of education: Not on file    Highest education level: Not on file   Social Needs    Financial resource strain: Not on file    Food insecurity - worry: Not on file    Food insecurity - inability: Not on file    Transportation needs - medical: Not on file    Transportation needs - non-medical: Not on file   Occupational History    Not on file   Tobacco Use    Smoking status: Never Smoker    Smokeless tobacco: Never Used   Substance and Sexual Activity    Alcohol use: No     Alcohol/week: 0.0 oz    Drug use: No    Sexual activity: Yes     Partners: Female   Other Topics Concern    Not on file   Social History Narrative    Not on file     Medication List with Changes/Refills   Current Medications    ACETAMINOPHEN (ARTHRITIS PAIN RELIEVER ORAL)    Take by mouth.    AMLODIPINE (NORVASC) 10 MG TABLET    Take 10 mg by mouth.    BAICALIN-CATECHIN (LIMBREL) 500 MG CAP    Take 500 mg by mouth 2 (two) times daily.    CELECOXIB (CELEBREX) 200 MG CAPSULE    Take 1 capsule (200 mg total) by mouth 2 (two) times daily as needed for Pain.    EFINACONAZOLE (JUBLIA) 10 % ANITHA    Apply 1 application topically once daily.    GABAPENTIN (NEURONTIN) 800 MG TABLET    Take 1 tablet (800 mg total) by mouth 3 (three) times daily.    HYDRALAZINE (APRESOLINE) 100 MG TABLET        LISINOPRIL-HYDROCHLOROTHIAZIDE (PRINZIDE,ZESTORETIC) 20-12.5 MG PER TABLET        MUPIROCIN (BACTROBAN) 2 % OINTMENT    Apply topically 3 (three) times daily.    MV-MN/FA/LYCOPENE/LUT/HB#178 (HUBERT MULTIVITAMIN FOR MEN ORAL)    Take by mouth.    OXAPROZIN (DAYPRO) 600 MG TABLET        ROSUVASTATIN (CRESTOR) 10 MG TABLET    Take 10 mg by mouth.    SILDENAFIL, ANTIHYPERTENSIVE, (REVATIO) 20 MG TAB         SIMVASTATIN (ZOCOR) 20 MG TABLET    Take 1 tablet (20 mg total) by mouth every evening. - for heart health    TIZANIDINE (ZANAFLEX) 4 MG TABLET    TAKE ONE TABLET BY MOUTH TWICE DAILY AS NEEDED    TRIUMEQ 600- MG TAB         Review of patient's allergies indicates:   Allergen Reactions    Chocolate flavor Swelling     Review of Systems   Constitution: Negative for fever.   HENT: Negative for sore throat.    Eyes: Negative for blurred vision.   Cardiovascular: Negative for dyspnea on exertion.   Respiratory: Negative for shortness of breath.    Hematologic/Lymphatic: Does not bruise/bleed easily.   Skin: Negative for itching.   Musculoskeletal: Positive for arthritis, back pain, joint pain, joint swelling, muscle cramps, muscle weakness, neck pain and stiffness. Negative for falls.   Gastrointestinal: Negative for vomiting.   Genitourinary: Negative for dysuria.   Neurological: Positive for numbness and paresthesias. Negative for dizziness and loss of balance.   Psychiatric/Behavioral: The patient does not have insomnia.        Objective:   Body mass index is 27.87 kg/m².  Vitals:    03/04/19 0955   BP: 124/69   Pulse: 69   Resp: 18             General    Nursing note and vitals reviewed.  Constitutional: He is oriented to person, place, and time. He appears well-developed. No distress.   HENT:   Head: Normocephalic and atraumatic.   Eyes: EOM are normal.   Neck: Neck supple.   Cardiovascular: Normal rate.    Pulmonary/Chest: Effort normal.   Neurological: He is alert and oriented to person, place, and time.   Psychiatric: He has a normal mood and affect. His behavior is normal.     General Musculoskeletal Exam   Gait: antalgic       Right Knee Exam     Comments:  Exam of the right knee, unchanged, demonstrates no noticeable effusion.  Range of motions from 0-120° with no pain.  Hamstrings are well stretched.  Quadriceps tone is good.      TTP right hip trochanteric bursa. No pain with logroll or gentle ROM to  the groin.    Left Knee Exam     Inspection   Effusion: present  Deformity: present    Tenderness   The patient tender to palpation of the medial joint line.    Comments:  Examination with standing demonstrates mild varus deformity of the left leg.  With ambulation he has no varus thrust.  He has a antalgic gait.  Exam of the left knee, unchanged, the left knee demonstrates moderate effusion.  He does have palpation over the medial joint line middle one third and posterior one third markedly.  Mildly tender to palpation over lateral joint line.  Tender to palpation over the border of the patella.  He does have full extension and can flex to 110.  Crepitus noted to the patellofemoral joint with active and passive range of motion.  He is stable anterior drawer, stable locking, stable posterior drawer, stable varus valgus stress at 0 and 30°.  His varus does seem correctable at 30° with manual correction.  Mariana's was not tested today.  Distally he has sensation light touch intact to 2+ DP.  Pulse intact plantar flexion dorsiflexion tib ant and flexor hallucis longus.      His hip is stiff with perhaps 5° of internal rotation and 10° of external rotation.  I can flex to 90 but again a great deal of hip stiffness.  Quadriceps tone is mildly decreased.    Right Hip Exam     Tenderness   The patient tender to palpation of the trochanteric bursa.    Range of Motion   Abduction: 20   Flexion: 90   External rotation: 30   Internal rotation: 5     Tests   Pain w/ forced internal rotation (MAEVE): present  Pain w/ forced external rotation (FADIR): absent  Log Roll: negative    Other   Sensation: normal      Radiographs / Imaging : Results reviewed by me and interpreted by me, discussed with the patient and / or family       Assessment:     Encounter Diagnoses   Name Primary?    Greater trochanteric bursitis of right hip Yes    Primary osteoarthritis of left knee     Acquired varus deformity knee, left         Plan:       He has had some relief with his home stretching program for his right hip trochanteric bursitis, but he still has persistent pain that is not relieved with his spinal cord stimulator, stays lateral on the hip. Consistent with trochanteric bursitis.  Have recommended possible corticosteroid injection to the hip trochanteric bursitis versus today, discussed risk benefits, pros and cons, he would    Like to proceed. Regarding left knee we discussed that certainly reasonable to continue nonoperative treatment at this time with medial  brace, medications, corticosteroid injections p.r.n..  Wait at least 1 more month before another left knee corticosteroid injection. Discussed with him that overall HTO versus total knee replacement is certainly an option if he considers that he has failed non operative treatment.

## 2019-04-15 NOTE — ANESTHESIA POSTPROCEDURE EVALUATION
"Anesthesia Post Evaluation    Patient: Cornel Dick    Procedure(s) Performed: Procedure(s) (LRB):  TRIAL-SPINAL CORD STIMULATOR (N/A)    Final Anesthesia Type: MAC  Patient location during evaluation: PACU  Patient participation: Yes- Able to Participate  Level of consciousness: awake and alert  Post-procedure vital signs: reviewed and stable  Pain management: adequate  Airway patency: patent  PONV status at discharge: No PONV  Anesthetic complications: no      Cardiovascular status: blood pressure returned to baseline  Respiratory status: unassisted  Hydration status: euvolemic  Follow-up not needed.        Visit Vitals    BP (!) 164/104    Pulse 70    Temp 36.7 °C (98 °F)    Resp 16    Ht 6' 4" (1.93 m)    Wt 104.7 kg (230 lb 13.2 oz)    SpO2 100%    BMI 28.1 kg/m2       Pain/[place] Score: Pain Assessment Performed: Yes (5/3/2017  2:15 PM)  Presence of Pain: denies (5/3/2017  2:15 PM)  [place] Score: 10 (5/3/2017  2:15 PM)      " Spoke to nurse, on 5 mg MON-WED 6 mg THURS-SUN.     Since there is a jump in INR. Advised 5 mg MON-FRI and 6 mg SAT-SUN and recheck in 1 week.     Just record!

## 2019-05-09 ENCOUNTER — TELEPHONE (OUTPATIENT)
Dept: ORTHOPEDICS | Facility: CLINIC | Age: 49
End: 2019-05-09

## 2019-05-09 NOTE — TELEPHONE ENCOUNTER
Called pt to reschedule his apt on 5/13 due to dr. brady being out of office. Pt is reschedule to 5/30 at [facility]. Pt understood.

## 2019-05-15 ENCOUNTER — TELEPHONE (OUTPATIENT)
Dept: ORTHOPEDICS | Facility: CLINIC | Age: 49
End: 2019-05-15

## 2019-06-06 ENCOUNTER — TELEPHONE (OUTPATIENT)
Dept: ORTHOPEDICS | Facility: CLINIC | Age: 49
End: 2019-06-06

## 2019-06-13 ENCOUNTER — OFFICE VISIT (OUTPATIENT)
Dept: ORTHOPEDICS | Facility: CLINIC | Age: 49
End: 2019-06-13
Payer: COMMERCIAL

## 2019-06-13 VITALS
RESPIRATION RATE: 18 BRPM | DIASTOLIC BLOOD PRESSURE: 84 MMHG | HEIGHT: 76 IN | WEIGHT: 229 LBS | BODY MASS INDEX: 27.89 KG/M2 | SYSTOLIC BLOOD PRESSURE: 131 MMHG | HEART RATE: 68 BPM

## 2019-06-13 DIAGNOSIS — G89.29 CHRONIC LOW BACK PAIN, UNSPECIFIED BACK PAIN LATERALITY, WITH SCIATICA PRESENCE UNSPECIFIED: ICD-10-CM

## 2019-06-13 DIAGNOSIS — G89.29 CHRONIC PAIN OF LEFT KNEE: ICD-10-CM

## 2019-06-13 DIAGNOSIS — M17.12 PRIMARY OSTEOARTHRITIS OF LEFT KNEE: Primary | ICD-10-CM

## 2019-06-13 DIAGNOSIS — M54.5 CHRONIC LOW BACK PAIN, UNSPECIFIED BACK PAIN LATERALITY, WITH SCIATICA PRESENCE UNSPECIFIED: ICD-10-CM

## 2019-06-13 DIAGNOSIS — M21.162 ACQUIRED VARUS DEFORMITY KNEE, LEFT: ICD-10-CM

## 2019-06-13 DIAGNOSIS — M70.61 GREATER TROCHANTERIC BURSITIS OF RIGHT HIP: ICD-10-CM

## 2019-06-13 DIAGNOSIS — M25.562 CHRONIC PAIN OF LEFT KNEE: ICD-10-CM

## 2019-06-13 PROCEDURE — 3075F PR MOST RECENT SYSTOLIC BLOOD PRESS GE 130-139MM HG: ICD-10-PCS | Mod: CPTII,S$GLB,, | Performed by: ORTHOPAEDIC SURGERY

## 2019-06-13 PROCEDURE — 3079F DIAST BP 80-89 MM HG: CPT | Mod: CPTII,S$GLB,, | Performed by: ORTHOPAEDIC SURGERY

## 2019-06-13 PROCEDURE — 99999 PR PBB SHADOW E&M-EST. PATIENT-LVL III: ICD-10-PCS | Mod: PBBFAC,,, | Performed by: ORTHOPAEDIC SURGERY

## 2019-06-13 PROCEDURE — 3008F PR BODY MASS INDEX (BMI) DOCUMENTED: ICD-10-PCS | Mod: CPTII,S$GLB,, | Performed by: ORTHOPAEDIC SURGERY

## 2019-06-13 PROCEDURE — 99214 OFFICE O/P EST MOD 30 MIN: CPT | Mod: 25,S$GLB,, | Performed by: ORTHOPAEDIC SURGERY

## 2019-06-13 PROCEDURE — 3008F BODY MASS INDEX DOCD: CPT | Mod: CPTII,S$GLB,, | Performed by: ORTHOPAEDIC SURGERY

## 2019-06-13 PROCEDURE — 99999 PR PBB SHADOW E&M-EST. PATIENT-LVL III: CPT | Mod: PBBFAC,,, | Performed by: ORTHOPAEDIC SURGERY

## 2019-06-13 PROCEDURE — 20610 LARGE JOINT ASPIRATION/INJECTION: L KNEE: ICD-10-PCS | Mod: LT,S$GLB,, | Performed by: ORTHOPAEDIC SURGERY

## 2019-06-13 PROCEDURE — 99214 PR OFFICE/OUTPT VISIT, EST, LEVL IV, 30-39 MIN: ICD-10-PCS | Mod: 25,S$GLB,, | Performed by: ORTHOPAEDIC SURGERY

## 2019-06-13 PROCEDURE — 3079F PR MOST RECENT DIASTOLIC BLOOD PRESSURE 80-89 MM HG: ICD-10-PCS | Mod: CPTII,S$GLB,, | Performed by: ORTHOPAEDIC SURGERY

## 2019-06-13 PROCEDURE — 3075F SYST BP GE 130 - 139MM HG: CPT | Mod: CPTII,S$GLB,, | Performed by: ORTHOPAEDIC SURGERY

## 2019-06-13 PROCEDURE — 20610 DRAIN/INJ JOINT/BURSA W/O US: CPT | Mod: LT,S$GLB,, | Performed by: ORTHOPAEDIC SURGERY

## 2019-06-13 RX ORDER — METHYLPREDNISOLONE ACETATE 80 MG/ML
80 INJECTION, SUSPENSION INTRA-ARTICULAR; INTRALESIONAL; INTRAMUSCULAR; SOFT TISSUE
Status: DISCONTINUED | OUTPATIENT
Start: 2019-06-13 | End: 2019-06-13 | Stop reason: HOSPADM

## 2019-06-13 RX ADMIN — METHYLPREDNISOLONE ACETATE 80 MG: 80 INJECTION, SUSPENSION INTRA-ARTICULAR; INTRALESIONAL; INTRAMUSCULAR; SOFT TISSUE at 10:06

## 2019-06-13 NOTE — PROCEDURES
Large Joint Aspiration/Injection: L knee  Date/Time: 6/13/2019 10:09 AM  Performed by: Ford Alcantara MD  Authorized by: Ford Alcantara MD     Consent Done?:  Yes (Verbal)  Indications:  Pain and diagnostic evaluation  Procedure site marked: Yes    Timeout: Prior to procedure the correct patient, procedure, and site was verified      Location:  Knee  Site:  L knee  Prep: Patient was prepped and draped in usual sterile fashion    Needle size:  22 G  Approach:  Anterolateral  Medications:  80 mg methylPREDNISolone acetate 80 mg/mL  Patient tolerance:  Patient tolerated the procedure well with no immediate complications    Additional Comments: The patient had no adverse reactions to the medication. The patient was instructed to apply ice to the joint for 30 minutes on and 30 minutes off for the next 48 hours, and avoid strenuous activities for 48 hours following the injection. Patient was warned of possible blood sugar and/or blood pressure changes during that time regarding corticosteroid injections if applicable.  Following that time, patient can resume regular activities. Note that informed consent was performed with the patient before injection discussing risks, benefits, indications and alternatives to injection, risks including but not limited to bleeding and infection.

## 2019-06-13 NOTE — PROGRESS NOTES
Subjective:     Patient ID: Cornel Dick is a 48 y.o. male.    Chief Complaint: Pain of the Left Knee and Pain of the Right Hip      Will is here for follow-up of his left knee, right hip, he states right hips doing a little better, work on his exercise, does think it is coming from both his hip and his back.  His left knee and a little more bothersome again most of his pain is medial.  He is using medial on her brace and he would like to know if the injection here as possible today, last x-ray was January of this year, he had good relief for that for 2-3 months.    Knee Pain    The pain is present in the left knee and right hip. The pain radiates to the left foot, groin and left thigh. This is a chronic problem. The current episode started more than 1 year ago. There has been no history of extremity trauma. Movement associated with injury: no injury.The problem occurs daily. The problem has been unchanged. The quality of the pain is described as aching, sharp and shooting. The pain is at a severity of 8/10. Associated symptoms include numbness and stiffness. Pertinent negatives include no fever or itching. The symptoms are aggravated by activity and bearing weight. He has tried injection treatment and oral narcotics (Left Knee ATS) for the symptoms. The treatment provided mild relief. Physical therapy was effective.      Past Medical History:   Diagnosis Date    Anemia 2/21/2018    Arthritis     Benign essential hypertension 2/21/2018    Drug-induced erectile dysfunction 2/21/2018    HBP (high blood pressure)     Renal insufficiency 2/21/2018     Past Surgical History:   Procedure Laterality Date    back stimulator      KNEE ARTHROSCOPY Left     TRIAL-SPINAL CORD STIMULATOR N/A 5/3/2017    Performed by Greg Puente MD at [facility] OR     Family History   Problem Relation Age of Onset    Heart disease Father      Social History     Socioeconomic History    Marital status:      Spouse  name: Not on file    Number of children: Not on file    Years of education: Not on file    Highest education level: Not on file   Occupational History    Not on file   Social Needs    Financial resource strain: Not on file    Food insecurity:     Worry: Not on file     Inability: Not on file    Transportation needs:     Medical: Not on file     Non-medical: Not on file   Tobacco Use    Smoking status: Never Smoker    Smokeless tobacco: Never Used   Substance and Sexual Activity    Alcohol use: No     Alcohol/week: 0.0 oz    Drug use: No    Sexual activity: Yes     Partners: Female   Lifestyle    Physical activity:     Days per week: Not on file     Minutes per session: Not on file    Stress: Not on file   Relationships    Social connections:     Talks on phone: Not on file     Gets together: Not on file     Attends Yarsanism service: Not on file     Active member of club or organization: Not on file     Attends meetings of clubs or organizations: Not on file     Relationship status: Not on file   Other Topics Concern    Not on file   Social History Narrative    Not on file     Medication List with Changes/Refills   Current Medications    ACETAMINOPHEN (ARTHRITIS PAIN RELIEVER ORAL)    Take by mouth.    AMLODIPINE (NORVASC) 10 MG TABLET    Take 10 mg by mouth.    BAICALIN-CATECHIN (LIMBREL) 500 MG CAP    Take 500 mg by mouth 2 (two) times daily.    CELECOXIB (CELEBREX) 200 MG CAPSULE    Take 1 capsule (200 mg total) by mouth 2 (two) times daily as needed for Pain.    EFINACONAZOLE (JUBLIA) 10 % ANITHA    Apply 1 application topically once daily.    GABAPENTIN (NEURONTIN) 800 MG TABLET    Take 1 tablet (800 mg total) by mouth 3 (three) times daily.    HYDRALAZINE (APRESOLINE) 100 MG TABLET        LISINOPRIL-HYDROCHLOROTHIAZIDE (PRINZIDE,ZESTORETIC) 20-12.5 MG PER TABLET        MUPIROCIN (BACTROBAN) 2 % OINTMENT    Apply topically 3 (three) times daily.    MV-MN/FA/LYCOPENE/LUT/HB#178 (HUBERT MULTIVITAMIN  FOR MEN ORAL)    Take by mouth.    OXAPROZIN (DAYPRO) 600 MG TABLET        ROSUVASTATIN (CRESTOR) 10 MG TABLET    Take 10 mg by mouth.    SILDENAFIL, ANTIHYPERTENSIVE, (REVATIO) 20 MG TAB        SIMVASTATIN (ZOCOR) 20 MG TABLET    Take 1 tablet (20 mg total) by mouth every evening. - for heart health    TIZANIDINE (ZANAFLEX) 4 MG TABLET    TAKE ONE TABLET BY MOUTH TWICE DAILY AS NEEDED    TRIUMEQ 600- MG TAB         Review of patient's allergies indicates:   Allergen Reactions    Chocolate flavor Swelling     Review of Systems   Constitution: Negative for fever.   HENT: Negative for sore throat.    Eyes: Negative for blurred vision.   Cardiovascular: Negative for dyspnea on exertion.   Respiratory: Negative for shortness of breath.    Hematologic/Lymphatic: Does not bruise/bleed easily.   Skin: Negative for itching.   Musculoskeletal: Positive for arthritis, back pain, joint pain, joint swelling, muscle cramps, muscle weakness, neck pain and stiffness. Negative for falls.   Gastrointestinal: Negative for vomiting.   Genitourinary: Negative for dysuria.   Neurological: Positive for numbness and paresthesias. Negative for dizziness and loss of balance.   Psychiatric/Behavioral: The patient does not have insomnia.        Objective:   Body mass index is 27.87 kg/m².  Vitals:    06/13/19 0939   BP: 131/84   Pulse: 68   Resp: 18             General    Nursing note and vitals reviewed.  Constitutional: He is oriented to person, place, and time. He appears well-developed. No distress.   HENT:   Head: Normocephalic and atraumatic.   Eyes: EOM are normal.   Neck: Neck supple.   Cardiovascular: Normal rate.    Pulmonary/Chest: Effort normal.   Neurological: He is alert and oriented to person, place, and time.   Psychiatric: He has a normal mood and affect. His behavior is normal.     General Musculoskeletal Exam   Gait: antalgic         Left Knee Exam     Inspection   Effusion: present  Deformity: present    Tenderness    The patient tender to palpation of the medial joint line.    Range of Motion   Extension: 10   Flexion: 110     Right Hip Exam     Tenderness   The patient tender to palpation of the trochanteric bursa.    Range of Motion   Abduction: 20   Flexion: 90   External rotation: 30   Internal rotation: 5     Tests   Pain w/ forced internal rotation (MAEVE): present  Pain w/ forced external rotation (FADIR): absent  Log Roll: negative    Other   Sensation: normal      Muscle Strength   Left Lower Extremity   Left quadriceps strength: Mild decreased quad tone.     Radiographs / Imaging : Results reviewed by me and interpreted by me, discussed with the patient and / or family - prior radiographs reviewed for completeness sake      Assessment:     Encounter Diagnoses   Name Primary?    Primary osteoarthritis of left knee Yes    Acquired varus deformity knee, left     Greater trochanteric bursitis of right hip     Chronic low back pain, unspecified back pain laterality, with sciatica presence unspecified     Chronic pain of left knee         Plan:     I had an in depth discussion today with Cornel day regarding his right hip and left knee problem, going over his radiographs and the model to help further his understanding. I explained the anatomy and pathophysiology of the problem. I told Cornel  that I believe the problem relates to above noted diagnoses . We had an in depth discussion regarding appropriate treatment and management of his condition.     From a treatment standpoint, the decision was made to go forward with :     Continue hip stretching    Regarding his left knee, with his young age, I think there is some wisdom in maximizing nonoperative treatment for as long as possible, I think that corticosteroid injection left today is appropriate and could be helpful form, discussed risks benefits pros and cons clinic small risk of infection he would like to proceed.    Discussed long-term left HTO versus total  knee arthroplasty likely indicated but hopefully we can prolong this

## 2019-06-25 ENCOUNTER — TELEPHONE (OUTPATIENT)
Dept: ORTHOPEDICS | Facility: CLINIC | Age: 49
End: 2019-06-25

## 2019-06-25 NOTE — TELEPHONE ENCOUNTER
----- Message from Kashmir Mireles MA sent at 6/13/2019 10:45 AM CDT -----  Good morning,    Sorry this patient cannot be seen because they have been dismissed from this department.     Thank you ,    Kashmir Mireles MA      ----- Message -----  From: Geneva Vick LPN  Sent: 6/13/2019  10:22 AM  To: [facility] Staff    Dr. Alcantara would like this patient to be scheduled for genicular nerve block.  Could someone please reach out to patient to get this set up, thank you.

## 2019-07-18 NOTE — PROGRESS NOTES
RHEUMATOLOGY CLINIC INITIAL VISIT    Reason for referral:-  Referred by Dr. Puente for evaluation of rheumatoid arthritis     Chief complaints:-  My knees, ankles and back hurts.    HPI:-  Cornel Lam a 46 y.o. pleasant male comes in for an initial visit with above chief complaints.  He is very complex past medical history including severe osteoarthritis of knee joint and multiple soft tissue injuries in the knee joints.  He was recently seen by our interventional pain specialist for osteoarthritis of lumbar spine and underwent cortisone injection.  He was referred here to rule out any underlying rheumatoid arthritis because he was having more pain in multiple joints associated with swelling.  He complains of pain and swelling over bilateral hands and ankles for the past year.  He also complains of worsening pain over his knee joints and his underwent surgical correction of the soft tissue injury.  Recently he started having worsening pain over the right knee for which an MRI was done which showed medial meniscal tear but he denies any injury.  He denies any visual disturbances.  He denies any photosensitivity, malar rash, sicca syndrome or Raynaud's phenomenon.  He complains of diffuse pain all over the body associated with fatigue.  He also complains of pain all over the back related to activities.    Review of Systems   Constitutional: Positive for malaise/fatigue. Negative for chills and fever.   HENT: Negative for nosebleeds and sore throat.    Eyes: Negative for blurred vision, photophobia and redness.   Respiratory: Negative for cough, sputum production and shortness of breath.    Cardiovascular: Negative for chest pain and leg swelling.   Gastrointestinal: Negative for abdominal pain, constipation and diarrhea.   Genitourinary: Negative for dysuria, frequency and urgency.   Musculoskeletal: Positive for back pain, joint pain, myalgias and  neck pain. Negative for falls.   Skin: Negative for itching and rash.   Neurological: Positive for weakness. Negative for dizziness, tremors, seizures, loss of consciousness and headaches.   Endo/Heme/Allergies: Negative for environmental allergies. Does not bruise/bleed easily.   Psychiatric/Behavioral: Negative for hallucinations and memory loss. The patient does not have insomnia.        Past Medical History   Diagnosis Date    HBP (high blood pressure)        History reviewed. No pertinent past surgical history.     Social History   Substance Use Topics    Smoking status: Never Smoker    Smokeless tobacco: None    Alcohol use No       History reviewed. No pertinent family history.    Review of patient's allergies indicates:   Allergen Reactions    Chocolate flavor Swelling           Physical examination:-    Vitals:    01/31/17 1552   BP: (!) 150/96   Pulse: 78   Weight: 100.1 kg (220 lb 10.9 oz)   PainSc:   7   PainLoc: Generalized       Physical Exam   Constitutional: He is oriented to person, place, and time and well-developed, well-nourished, and in no distress. No distress.   HENT:   Head: Normocephalic and atraumatic.   Mouth/Throat: Oropharynx is clear and moist.   Eyes: Conjunctivae and EOM are normal. Pupils are equal, round, and reactive to light. Right eye exhibits no discharge. Left eye exhibits no discharge. No scleral icterus.   Neck: Normal range of motion. Neck supple.   Cardiovascular: Normal rate and intact distal pulses.    Pulmonary/Chest: Effort normal. No respiratory distress. He exhibits no tenderness.   Abdominal: Soft. There is no tenderness.   Musculoskeletal:   Mild puffiness around bilateral second and third metacarpophalangeal joints and second and third proximal interphalangeal joints but the tenderness was out of proportion to the level of swelling.  Large joints like elbows, shoulders failed to show any effusion.  Tenderness present over bilateral knees but he has been  diagnosed recently with soft tissue injury including medial meniscal tear in the background of severe asymmetric osteoarthritis in his knee joints.  Bilateral ankles were tender and hand effusion.  Bilateral MTP squeeze test positive.    Lymphadenopathy:     He has no cervical adenopathy.   Neurological: He is alert and oriented to person, place, and time. Gait normal.   Skin: Skin is warm. No rash noted. He is not diaphoretic. No erythema. No pallor.   Psychiatric: Mood, memory, affect and judgment normal.   Nursing note and vitals reviewed.      X-rays:-  Independent visualization of images done.  Asymmetric osteoarthritis over both knees.  MRI shows medial meniscal tear in the right knee.      Medication List with Changes/Refills   Current Medications    ACETAMINOPHEN (ARTHRITIS PAIN RELIEVER ORAL)    Take by mouth.    AMLODIPINE (NORVASC) 10 MG TABLET        EFINACONAZOLE (JUBLIA) 10 % ANITHA    Apply 1 application topically once daily.    GABAPENTIN (NEURONTIN) 300 MG CAPSULE    TAKE ONE CAPSULE BY MOUTH IN THE EVENING    LISINOPRIL-HYDROCHLOROTHIAZIDE (PRINZIDE,ZESTORETIC) 20-12.5 MG PER TABLET        MV-MN/FA/LYCOPENE/LUT/HB#178 (HUBERT MULTIVITAMIN FOR MEN ORAL)    Take by mouth.    OXAPROZIN (DAYPRO) 600 MG TABLET    Take 2 tablets (1,200 mg total) by mouth once daily.    PRAVASTATIN (PRAVACHOL) 40 MG TABLET        TIZANIDINE (ZANAFLEX) 4 MG TABLET    TAKE 1 TABLET BY MOUTH NIGHTLY AS NEEDED    TRAMADOL (ULTRAM) 50 MG TABLET    TAKE ONE TABLET BY MOUTH EVERY 6 HOURS AS NEEDED FOR PAIN       Assessment/Plans:-  # Polyarthralgia:-  He has chronic pain over multiple joints including both small and large joints along with lumbago.  Examination shows mild puffiness around bilateral second and third metacarpophalangeal joints and second and third proximal interphalangeal joints but the tenderness was out of proportion likely secondary to the underlying myofascial pain syndrome.  Large joints like elbows, shoulders  failed to show any effusion.  Tenderness present over bilateral knees but he has been diagnosed recently with soft tissue injury including medial meniscal tear in the background of severe asymmetric osteoarthritis in his knee joints.  Bilateral ankles were tender and hand effusion.  Bilateral MTP squeeze test positive.  Based on these findings it's possible he might have underlying autoimmune inflammatory connective tissue disease like rheumatoid arthritis.  But are in his knee joint is unlikely based on the asymmetry joint space narrowing which is very characteristic for primary osteoarthritis.  Check x-rays of hands and feet to look for any joint space narrowing or erosions.  Check labs to evaluate for any underlying autoimmune inflammatory connective tissue disease like rheumatoid arthritis, lupus or seronegative arthritis.  Will give 1 dose of IM Celestone today to look for any inflammation control which might also help to make the diagnosis if the serology is returned negative and inflammation levels returned high.  - ROXIE; Future  - CBC auto differential; Standing  - Comprehensive metabolic panel; Standing  - C-reactive protein; Standing  - Sedimentation rate, manual; Standing  - Rheumatoid factor; Future  - Cyclic citrul peptide antibody, IgG; Future  - X-Ray Hand 3 View Bilateral; Future  - X-Ray Foot Complete Bilateral; Future  - betamethasone acetate-betamethasone sodium phosphate injection 6 mg; Inject 1 mL (6 mg total) into the muscle one time.  - Ferritin; Future  - Iron and TIBC; Future     # RTC in 4 weeks.    Thank you Dr. Puente for allowing me to participate in the care ofCornel Konrad Dick.  Disclaimer: This note was prepared using voice recognition system and is likely to have sound alike errors and is not proof read.  Please call me with any questions.     Implemented All Universal Safety Interventions:  [place] to call system. Call bell, personal items and telephone within reach. Instruct patient to call for assistance. Room bathroom lighting operational. Non-slip footwear when patient is off stretcher. Physically safe environment: no spills, clutter or unnecessary equipment. Stretcher in lowest position, wheels locked, appropriate side rails in place.

## 2019-09-19 ENCOUNTER — OFFICE VISIT (OUTPATIENT)
Dept: ORTHOPEDICS | Facility: CLINIC | Age: 49
End: 2019-09-19
Payer: COMMERCIAL

## 2019-09-19 ENCOUNTER — APPOINTMENT (OUTPATIENT)
Dept: RADIOLOGY | Facility: HOSPITAL | Age: 49
End: 2019-09-19
Attending: ORTHOPAEDIC SURGERY
Payer: COMMERCIAL

## 2019-09-19 VITALS
RESPIRATION RATE: 18 BRPM | HEIGHT: 76 IN | SYSTOLIC BLOOD PRESSURE: 140 MMHG | BODY MASS INDEX: 27.89 KG/M2 | HEART RATE: 67 BPM | DIASTOLIC BLOOD PRESSURE: 89 MMHG | WEIGHT: 229 LBS

## 2019-09-19 DIAGNOSIS — M25.562 PAIN IN BOTH KNEES, UNSPECIFIED CHRONICITY: ICD-10-CM

## 2019-09-19 DIAGNOSIS — M17.12 PRIMARY OSTEOARTHRITIS OF LEFT KNEE: Primary | ICD-10-CM

## 2019-09-19 DIAGNOSIS — M21.162 ACQUIRED VARUS DEFORMITY KNEE, LEFT: ICD-10-CM

## 2019-09-19 DIAGNOSIS — M54.5 CHRONIC LOW BACK PAIN, UNSPECIFIED BACK PAIN LATERALITY, WITH SCIATICA PRESENCE UNSPECIFIED: ICD-10-CM

## 2019-09-19 DIAGNOSIS — M25.561 PAIN IN BOTH KNEES, UNSPECIFIED CHRONICITY: ICD-10-CM

## 2019-09-19 DIAGNOSIS — R52 PAIN: Primary | ICD-10-CM

## 2019-09-19 DIAGNOSIS — G89.29 CHRONIC LOW BACK PAIN, UNSPECIFIED BACK PAIN LATERALITY, WITH SCIATICA PRESENCE UNSPECIFIED: ICD-10-CM

## 2019-09-19 DIAGNOSIS — M70.61 GREATER TROCHANTERIC BURSITIS OF RIGHT HIP: ICD-10-CM

## 2019-09-19 PROCEDURE — 73562 X-RAY EXAM OF KNEE 3: CPT | Mod: 26,59,RT, | Performed by: RADIOLOGY

## 2019-09-19 PROCEDURE — 99999 PR PBB SHADOW E&M-EST. PATIENT-LVL IV: CPT | Mod: PBBFAC,,, | Performed by: ORTHOPAEDIC SURGERY

## 2019-09-19 PROCEDURE — 73562 X-RAY EXAM OF KNEE 3: CPT | Mod: TC,PO,RT

## 2019-09-19 PROCEDURE — 73564 XR KNEE ORTHO LEFT WITH FLEXION: ICD-10-PCS | Mod: 26,LT,, | Performed by: RADIOLOGY

## 2019-09-19 PROCEDURE — 73564 X-RAY EXAM KNEE 4 OR MORE: CPT | Mod: 26,LT,, | Performed by: RADIOLOGY

## 2019-09-19 PROCEDURE — 73562 XR KNEE ORTHO LEFT WITH FLEXION: ICD-10-PCS | Mod: 26,59,RT, | Performed by: RADIOLOGY

## 2019-09-19 PROCEDURE — 99213 PR OFFICE/OUTPT VISIT, EST, LEVL III, 20-29 MIN: ICD-10-PCS | Mod: S$PBB,,, | Performed by: ORTHOPAEDIC SURGERY

## 2019-09-19 PROCEDURE — 99213 OFFICE O/P EST LOW 20 MIN: CPT | Mod: S$PBB,,, | Performed by: ORTHOPAEDIC SURGERY

## 2019-09-19 PROCEDURE — 99999 PR PBB SHADOW E&M-EST. PATIENT-LVL IV: ICD-10-PCS | Mod: PBBFAC,,, | Performed by: ORTHOPAEDIC SURGERY

## 2019-09-19 NOTE — PROGRESS NOTES
Subjective:     Patient ID: Cornel Dick is a 49 y.o. male.    Chief Complaint: Pain of the Left Knee and Pain of the Right Hip      Right hip pain comes and goes, would like to focus on stretching for now to hip, hold on injection    Left knee pain is all medial he states. Better with brace, is considering GNB vs. HTO .vs TKA at this time    Knee Pain    The pain is present in the left knee and right hip. The pain radiates to the left foot, groin and left thigh. This is a chronic problem. The current episode started more than 1 year ago. There has been no history of extremity trauma. Movement associated with injury: no injury.The problem occurs daily. The problem has been unchanged. The quality of the pain is described as aching, sharp and shooting. The pain is at a severity of 5/10. Associated symptoms include numbness and stiffness. Pertinent negatives include no fever or itching. The symptoms are aggravated by activity and bearing weight. He has tried injection treatment and oral narcotics (Left Knee ATS) for the symptoms. The treatment provided mild relief. Physical therapy was effective.      Past Medical History:   Diagnosis Date    Anemia 2/21/2018    Arthritis     Benign essential hypertension 2/21/2018    Drug-induced erectile dysfunction 2/21/2018    HBP (high blood pressure)     Renal insufficiency 2/21/2018     Past Surgical History:   Procedure Laterality Date    back stimulator      KNEE ARTHROSCOPY Left     TRIAL-SPINAL CORD STIMULATOR N/A 5/3/2017    Performed by Greg Puente MD at [facility] [place] [facility]     Family History   Problem Relation Age of Onset    Heart disease Father      Social History     Socioeconomic History    Marital status:      Spouse name: Not on file    Number of children: Not on file    Years of education: Not on file    Highest education level: Not on file   Occupational History    Not on file   Social Needs    Financial resource strain: Not on file     Food insecurity:     Worry: Not on file     Inability: Not on file    Transportation needs:     Medical: Not on file     Non-medical: Not on file   Tobacco Use    Smoking status: Never Smoker    Smokeless tobacco: Never Used   Substance and Sexual Activity    Alcohol use: No     Alcohol/week: 0.0 oz    Drug use: No    Sexual activity: Yes     Partners: Female   Lifestyle    Physical activity:     Days per week: Not on file     Minutes per session: Not on file    Stress: Not on file   Relationships    Social connections:     Talks on phone: Not on file     Gets together: Not on file     Attends Scientologist service: Not on file     Active member of club or organization: Not on file     Attends meetings of clubs or organizations: Not on file     Relationship status: Not on file   Other Topics Concern    Not on file   Social History Narrative    Not on file     Medication List with Changes/Refills   Current Medications    ACETAMINOPHEN (ARTHRITIS PAIN RELIEVER ORAL)    Take by mouth.    AMLODIPINE (NORVASC) 10 MG TABLET    Take 10 mg by mouth.    BAICALIN-CATECHIN (LIMBREL) 500 MG CAP    Take 500 mg by mouth 2 (two) times daily.    CELECOXIB (CELEBREX) 200 MG CAPSULE    Take 1 capsule (200 mg total) by mouth 2 (two) times daily as needed for Pain.    EFINACONAZOLE (JUBLIA) 10 % ANITHA    Apply 1 application topically once daily.    GABAPENTIN (NEURONTIN) 800 MG TABLET    Take 1 tablet (800 mg total) by mouth 3 (three) times daily.    HYDRALAZINE (APRESOLINE) 100 MG TABLET        LISINOPRIL-HYDROCHLOROTHIAZIDE (PRINZIDE,ZESTORETIC) 20-12.5 MG PER TABLET        MUPIROCIN (BACTROBAN) 2 % OINTMENT    Apply topically 3 (three) times daily.    MV-MN/FA/LYCOPENE/LUT/HB#178 (HUBERT MULTIVITAMIN FOR MEN ORAL)    Take by mouth.    OXAPROZIN (DAYPRO) 600 MG TABLET        ROSUVASTATIN (CRESTOR) 10 MG TABLET    Take 10 mg by mouth.    SILDENAFIL, ANTIHYPERTENSIVE, (REVATIO) 20 MG TAB        SIMVASTATIN (ZOCOR) 20 MG TABLET     Take 1 tablet (20 mg total) by mouth every evening. - for heart health    TIZANIDINE (ZANAFLEX) 4 MG TABLET    TAKE ONE TABLET BY MOUTH TWICE DAILY AS NEEDED    TRIUMEQ 600- MG TAB         Review of patient's allergies indicates:   Allergen Reactions    Chocolate flavor Swelling     Review of Systems   Constitution: Negative for fever.   HENT: Negative for sore throat.    Eyes: Negative for blurred vision.   Cardiovascular: Negative for dyspnea on exertion.   Respiratory: Negative for shortness of breath.    Hematologic/Lymphatic: Does not bruise/bleed easily.   Skin: Negative for itching.   Musculoskeletal: Positive for arthritis, back pain, joint pain, joint swelling, muscle cramps, muscle weakness, neck pain and stiffness. Negative for falls.   Gastrointestinal: Negative for vomiting.   Genitourinary: Negative for dysuria.   Neurological: Positive for numbness and paresthesias. Negative for dizziness and loss of balance.   Psychiatric/Behavioral: The patient does not have insomnia.        Objective:   Body mass index is 27.87 kg/m².  Vitals:    09/19/19 1046   BP: (!) 140/89   Pulse: 67   Resp: 18             General    Nursing note and vitals reviewed.  Constitutional: He is oriented to person, place, and time. He appears well-developed. No distress.   HENT:   Head: Normocephalic and atraumatic.   Eyes: EOM are normal.   Neck: Neck supple.   Cardiovascular: Normal rate.    Pulmonary/Chest: Effort normal.   Neurological: He is alert and oriented to person, place, and time.   Psychiatric: He has a normal mood and affect. His behavior is normal.     General Musculoskeletal Exam   Gait: antalgic         Left Knee Exam     Inspection   Effusion: present  Deformity: present    Tenderness   The patient tender to palpation of the medial joint line.    Crepitus   The patient has crepitus of the patella.    Range of Motion   Extension: 5   Flexion: 120     Tests   Stability Lachman: normal (-1 to 2mm) PCL-Posterior  Drawer: normal (0 to 2mm)  MCL - Valgus: normal (0 to 2mm)  LCL - Varus: abnormal - grade I  Patella   J-Sign: J sign absent    Right Hip Exam     Tenderness   The patient tender to palpation of the trochanteric bursa.    Range of Motion   Abduction: 20   Flexion: 90   External rotation: 30   Internal rotation: 5     Tests   Pain w/ forced internal rotation (MAEVE): present  Pain w/ forced external rotation (FADIR): absent  Log Roll: negative    Other   Sensation: normal      Muscle Strength   Left Lower Extremity   Left quadriceps strength: good quad tone      Radiographs / Imaging : Results reviewed by me and interpreted by me, discussed with the patient and / or family - prior radiographs reviewed for completeness sake  EXAMINATION:  XR KNEE ORTHO LEFT WITH FLEXION    CLINICAL HISTORY:  . Pain in right knee    TECHNIQUE:  AP standing view of both knees, PA flexion standing views of both knees, and Merchant views of both knees were performed. A lateral view of the left knee was also performed.    COMPARISON:  12/28/2017    FINDINGS:  No acute osseous abnormality.  Left greater than right knee degenerative findings similar in appearance most prevalent within the left knee medial compartment.  Similar right knee medial compartment joint space narrowing.  Small left suprapatellar joint effusion possible..      Impression       Similar appearance of the knees.      Electronically signed by: Roberto Anton MD  Date: 09/19/2019  Time: 11:13       6 degrees varus - would need approximately 7-8 degrees correction likely    Assessment:     Encounter Diagnoses   Name Primary?    Primary osteoarthritis of left knee Yes    Greater trochanteric bursitis of right hip     Chronic low back pain, unspecified back pain laterality, with sciatica presence unspecified     Acquired varus deformity knee, left         Plan:     I had an in depth discussion today with Cornel day regarding his right hip and left knee problem,  going over his radiographs and the model to help further his understanding. I explained the anatomy and pathophysiology of the problem. I told Cornel  that I believe the problem relates to above noted diagnoses . We had an in depth discussion regarding appropriate treatment and management of his condition.     From a treatment standpoint, the decision was made to go forward with :     Continue hip stretching    Regarding his left knee, with his young age, I think there is some wisdom in maximizing nonoperative treatment for as long as possible    Discussed long-term left HTO versus total knee arthroplasty likely indicated but hopefully we can prolong this    Discussed that since the medial  brace signifiantly helps, and medial pain predominates, HTO may be an option. Risks include standard surgical risks and infection, damage to blood vessels and nerves, non union of osteotomy site, risks of anesthesia, heart attack stroke death,  progression of PF and lateral OA, subsequent need for TKA, incomplete pain relief. Would need to be NWB for 6-12 weeks potentially with HTO    He will consider, plan follow up in 4 weeks

## 2020-05-19 NOTE — TELEPHONE ENCOUNTER
I called the patient, to get the patient rescheduled for the appointment with  at the [facility] location on 5/30/19 due to the xray machine being broken at the [facility] location. I got the patient rescheduled the patient verbalized understanding..     How Severe Are Your Spot(S)?: mild Have Your Spot(S) Been Treated In The Past?: has not been treated Hpi Title: Evaluation of Skin Lesions Year Removed: 1900

## 2021-04-28 ENCOUNTER — PATIENT MESSAGE (OUTPATIENT)
Dept: RESEARCH | Facility: HOSPITAL | Age: 51
End: 2021-04-28

## 2022-09-19 NOTE — PROGRESS NOTES
Subjective:      Patient ID: Cornel Dick is a 46 y.o. male.    Chief Complaint: Pain of the Right Knee    HPI: The patient is seen today primarily for evaluation of his right knee.  It began bothering him several weeks ago.  He denies a specific injury, however he is having painful popping and snapping as well as swelling in the knee intermittently.  The patient's knee occasionally locks up and he feels as if he cannot trust it.  He presents wearing a neoprene sleeve.  He is known to have evolving tricompartmental osteoarthritis of the left knee and he is known to have low back issues.    Review of Systems   Constitution: Negative for decreased appetite, fever, weight gain and weight loss.   HENT: Negative for ear pain, hearing loss, hoarse voice and sore throat.    Musculoskeletal: Positive for arthritis, back pain and joint pain.   Gastrointestinal: Negative.  Negative for constipation, diarrhea, nausea and vomiting.         Objective:            General    Constitutional: He is oriented to person, place, and time. He appears well-developed and well-nourished.   HENT:   Head: Normocephalic.   Nose: Nose normal.   Eyes: EOM are normal. Pupils are equal, round, and reactive to light.   Neck: Normal range of motion. Neck supple.   Cardiovascular: Normal rate and regular rhythm.    Pulmonary/Chest: Effort normal.   Abdominal: Soft. He exhibits no distension. There is no tenderness.   Neurological: He is alert and oriented to person, place, and time.   Psychiatric: He has a normal mood and affect. His behavior is normal.     General Musculoskeletal Exam   Gait: abnormal       Right Knee Exam     Inspection   Swelling: present    Tenderness   The patient is tender to palpation of the condyle, patella and medial joint line.    Crepitus   The patient has crepitus of the patella.    Tests   Meniscus   Mariana:  Medial - positive   Patella   Patellar Grind: positive    Left Knee Exam     Tenderness   The patient  tender to palpation of the patella.    Crepitus   The patient has crepitus of the patella, MFC and LFC.    Tests   Patella   Patellar Grind: positive                X-RAY:     Findings: Views obtained right patellar sunrise, standing AP both knees, right lateral. Comparison March 15, 2016. Stable medial compartment degenerative change more pronounced on the left. No joint effusion or acute osseous findings. Mild left patellofemoral joint arthropathy.    Assessment:           Encounter Diagnoses   Name Primary?    Post-traumatic osteoarthritis of left knee Yes    DDD (degenerative disc disease), lumbar     Spondylosis of lumbar region without myelopathy or radiculopathy     Internal derangement of knee joint, right     Arthritis of knee, left           Plan:     The patient was given a refill position for Daypro 1200 mg daily with food to diminish inflammation.  He is oriented taking Norco prescribed by Dr. Puente for back difficulties.  The patient is further definitive evaluation of his right knee with an MRI scan.  He will be seen back in 3 weeks to review the results of the MRI scan and check on his progress.  He has a follow-up appointment with Dr. Puente on February 5.   118

## 2023-08-22 ENCOUNTER — TELEPHONE (OUTPATIENT)
Dept: PAIN MEDICINE | Facility: CLINIC | Age: 53
End: 2023-08-22
Payer: MEDICARE

## 2023-08-22 NOTE — TELEPHONE ENCOUNTER
----- Message from Magda Bynum PA-C sent at 8/22/2023  2:54 PM CDT -----  Regarding: RE: appt  Per chart review:   >> Has previously undergone spinal injection/s:               --[place] Sci SCS trial on 5-3-17 followed by permanent implantation at [facility] thereafter    He has not been seen since 2018, so he would be a NEW patient.  At that time, he was discharged from noncompliance with Norco.   So, he stopped seeing us because he was no longer getting opioids from our department.    Secondly, he had his spinal cord stimulator implant at [facility], so he should follow up there regarding his stimulator and battery.      ----- Message -----  From: Keenan Warner MA  Sent: 8/22/2023   2:49 PM CDT  To: Magda Bynum PA-C  Subject: appt                                                 Patient is requesting a call back concerning a battery in his back. He stated he used to see WILY Bynum a while ago and was told when the battery begins to act up to call and schedule an appt.     Last seen: 2/14/2018  No coverage   Set an appt?   ----- Message -----  From: Margarita Jones  Sent: 8/22/2023   2:47 PM CDT  To: Misa Marques    Patient is requesting a call back concerning a battery in his back. He stated he used to see WILY Bynum a while ago and was told when the battery begins to act up to call and schedule an appt. Call back at [phone]           independent

## 2023-08-22 NOTE — TELEPHONE ENCOUNTER
Reach out to pt to inform     Per chart review:   >> Has previously undergone spinal injection/s:               --[place] Sci SCS trial on 5-3-17 followed by permanent implantation at [facility] thereafter     He has not been seen since 2018, so he would be a NEW patient.  At that time, he was discharged from noncompliance with Norco.   So, he stopped seeing us because he was no longer getting opioids from our department.     Secondly, he had his spinal cord stimulator implant at [place], so he should follow up there regarding his stimulator and battery.     Pt understood.   What about to set pt up with an appt. But pt was dismissed from the facility gave him the number to pt relations. ( [phone]

## 2023-08-23 ENCOUNTER — HOSPITAL ENCOUNTER (OUTPATIENT)
Dept: RADIOLOGY | Facility: HOSPITAL | Age: 53
Discharge: HOME | End: 2023-08-23
Attending: PODIATRIST
Payer: MEDICARE

## 2023-08-23 ENCOUNTER — OFFICE VISIT (OUTPATIENT)
Dept: PODIATRY | Facility: CLINIC | Age: 53
End: 2023-08-23
Payer: MEDICARE

## 2023-08-23 VITALS — WEIGHT: 229.06 LBS | HEIGHT: 76 IN | BODY MASS INDEX: 27.89 KG/M2

## 2023-08-23 DIAGNOSIS — B35.3 TINEA PEDIS OF LEFT FOOT: Primary | ICD-10-CM

## 2023-08-23 DIAGNOSIS — M25.372 INSTABILITY OF LEFT ANKLE JOINT: ICD-10-CM

## 2023-08-23 PROCEDURE — 3008F PR BODY MASS INDEX (BMI) DOCUMENTED: ICD-10-PCS | Mod: CPTII,S$GLB,, | Performed by: PODIATRIST

## 2023-08-23 PROCEDURE — 1159F MED LIST DOCD IN RCRD: CPT | Mod: CPTII,S$GLB,, | Performed by: PODIATRIST

## 2023-08-23 PROCEDURE — 73610 XR ANKLE COMPLETE 3 VIEW LEFT: ICD-10-PCS | Mod: 26,LT,, | Performed by: RADIOLOGY

## 2023-08-23 PROCEDURE — 99999 PR PBB SHADOW E&M-EST. PATIENT-LVL IV: ICD-10-PCS | Mod: PBBFAC,,, | Performed by: PODIATRIST

## 2023-08-23 PROCEDURE — 99204 PR OFFICE/OUTPT VISIT, NEW, LEVL IV, 45-59 MIN: ICD-10-PCS | Mod: 25,S$GLB,, | Performed by: PODIATRIST

## 2023-08-23 PROCEDURE — 99999 PR PBB SHADOW E&M-EST. PATIENT-LVL IV: CPT | Mod: PBBFAC,,, | Performed by: PODIATRIST

## 2023-08-23 PROCEDURE — 3008F BODY MASS INDEX DOCD: CPT | Mod: CPTII,S$GLB,, | Performed by: PODIATRIST

## 2023-08-23 PROCEDURE — 99204 OFFICE O/P NEW MOD 45 MIN: CPT | Mod: 25,S$GLB,, | Performed by: PODIATRIST

## 2023-08-23 PROCEDURE — 1160F RVW MEDS BY RX/DR IN RCRD: CPT | Mod: CPTII,S$GLB,, | Performed by: PODIATRIST

## 2023-08-23 PROCEDURE — 73610 X-RAY EXAM OF ANKLE: CPT | Mod: TC,LT

## 2023-08-23 PROCEDURE — 73610 X-RAY EXAM OF ANKLE: CPT | Mod: 26,LT,, | Performed by: RADIOLOGY

## 2023-08-23 PROCEDURE — 1159F PR MEDICATION LIST DOCUMENTED IN MEDICAL RECORD: ICD-10-PCS | Mod: CPTII,S$GLB,, | Performed by: PODIATRIST

## 2023-08-23 PROCEDURE — 1160F PR REVIEW ALL MEDS BY PRESCRIBER/CLIN PHARMACIST DOCUMENTED: ICD-10-PCS | Mod: CPTII,S$GLB,, | Performed by: PODIATRIST

## 2023-08-23 RX ORDER — KETOCONAZOLE 20 MG/G
CREAM TOPICAL DAILY
Qty: 60 G | Refills: 1 | Status: SHIPPED | OUTPATIENT
Start: 2023-08-23

## 2023-08-23 RX ORDER — TERBINAFINE HYDROCHLORIDE 250 MG/1
250 TABLET ORAL DAILY
Qty: 14 TABLET | Refills: 0 | Status: SHIPPED | OUTPATIENT
Start: 2023-08-23

## 2023-08-23 NOTE — PROGRESS NOTES
Subjective:     Patient ID: Cornel Dick is a 53 y.o. male.    Chief Complaint: Ankle Pain (C/o states his left foot/ankle been in pain, states his skin stay dry, rates pain 5/10, non-diabetic, injured several years ago, (fell and twisted ankle), wearing tennis and shoes, last seen PCP 08/18/23  )    Cornel is a 53 y.o. male who presents to the podiatry clinic  with complaint of  left ankle pain. Onset of the symptoms was several months ago. Precipitating event: none known. Current symptoms include:  ankle instability . Aggravating factors: walking. Symptoms have been intermittent. Patient has had prior foot problems.  Patients rates pain 5/10 on pain scale. Patient also complains of dry and peeling skin to the bottom of left foot. Patient has not other pedal complaints at this time.     Patient Active Problem List   Diagnosis    Osteoarthritis of left knee    Left ankle pain    Myofascial pain    Spondylosis of lumbar region without myelopathy or radiculopathy    DDD (degenerative disc disease), lumbar    Lumbar radiculopathy    Internal derangement of right knee    Arthritis of knee, left    Polyarthralgia    Primary osteoarthritis involving multiple joints    Iron deficiency anemia    Renal insufficiency    Erectile dysfunction    Benign essential hypertension    Risk for coronary artery disease between 10% and 20% in next 10 years    Cervical radiculopathy    Lateral epicondylitis of right elbow       Medication List with Changes/Refills   New Medications    KETOCONAZOLE (NIZORAL) 2 % CREAM    Apply topically once daily.    TERBINAFINE HCL (LAMISIL) 250 MG TABLET    Take 1 tablet (250 mg total) by mouth once daily.   Current Medications    ACETAMINOPHEN (ARTHRITIS PAIN RELIEVER ORAL)    Take by mouth.    AMLODIPINE (NORVASC) 10 MG TABLET    Take 10 mg by mouth.    BAICALIN-CATECHIN (LIMBREL) 500 MG CAP    Take 500 mg by mouth 2 (two) times daily.    CELECOXIB (CELEBREX) 200 MG CAPSULE    Take  "1 capsule (200 mg total) by mouth 2 (two) times daily as needed for Pain.    EFINACONAZOLE (JUBLIA) 10 % ANITHA    Apply 1 application topically once daily.    GABAPENTIN (NEURONTIN) 800 MG TABLET    Take 1 tablet (800 mg total) by mouth 3 (three) times daily.    HYDRALAZINE (APRESOLINE) 100 MG TABLET        LISINOPRIL-HYDROCHLOROTHIAZIDE (PRINZIDE,ZESTORETIC) 20-12.5 MG PER TABLET        MUPIROCIN (BACTROBAN) 2 % OINTMENT    Apply topically 3 (three) times daily.    MV-MN/FA/LYCOPENE/LUT/HB#178 (HUBERT MULTIVITAMIN FOR MEN ORAL)    Take by mouth.    OXAPROZIN (DAYPRO) 600 MG TABLET        ROSUVASTATIN (CRESTOR) 10 MG TABLET    Take 10 mg by mouth.    SILDENAFIL, ANTIHYPERTENSIVE, (REVATIO) 20 MG TAB        SIMVASTATIN (ZOCOR) 20 MG TABLET    Take 1 tablet (20 mg total) by mouth every evening. - for heart health    TIZANIDINE (ZANAFLEX) 4 MG TABLET    TAKE ONE TABLET BY MOUTH TWICE DAILY AS NEEDED    TRIUMEQ 600- MG TAB           Review of patient's allergies indicates:   Allergen Reactions    Chocolate flavor Swelling       Past Surgical History:   Procedure Laterality Date    back stimulator      KNEE ARTHROSCOPY Left        Family History   Problem Relation Age of Onset    Heart disease Father        Social History     Socioeconomic History    Marital status:    Tobacco Use    Smoking status: Never    Smokeless tobacco: Never   Substance and Sexual Activity    Alcohol use: No     Alcohol/week: 0.0 standard drinks of alcohol    Drug use: No    Sexual activity: Yes     Partners: Female       Vitals:    08/23/23 0916   Weight: 103.9 kg (229 lb 0.9 oz)   Height: 6' 4" (1.93 m)   PainSc:   5       Review of Systems   Constitutional:  Negative for chills and fever.   Cardiovascular: Negative.    Gastrointestinal:  Negative for diarrhea, nausea and vomiting.   Musculoskeletal:  Positive for joint pain (left ankle).   Skin:  Positive for itching and rash.   Neurological: Negative.    Endo/Heme/Allergies: " Negative.    Psychiatric/Behavioral: Negative.           Objective:      PHYSICAL EXAM: Apperance: Alert and orient in no distress,well developed, and with good attention to grooming and body habits  Lower Extremity Exam  VASCULAR: Dorsalis pedis pulses 2/4 bilateral and Posterior Tibial pulses 2/4 bilateral.   DERMATOLOGICAL: No skin rash, subcutaneous nodules, lesions or ulcers observed. Dry and scaly skin noted plantarly left in moccasin distribution. Webspaces 1,2,3,4 bilateral are clean, dry and without evidence of break in skin integrity.    NEUROLOGICAL: Light touch, sharp-dull, proprioception all present and equal bilaterally.    MUSCULOSKELETAL: Muscle strength is 5/5 for foot inverters, everters, plantarflexors, and dorsiflexors. Muscle tone is normal. (+) tenderness on palpation of left lateral and anterior ankle. Decreased left ankle joint ROM.       Assessment:       ICD-10-CM ICD-9-CM   1. Tinea pedis of left foot  B35.3 110.4   2. Instability of left ankle joint  M25.372 718.87       Plan:   Tinea pedis of left foot  -     terbinafine HCL (LAMISIL) 250 mg tablet; Take 1 tablet (250 mg total) by mouth once daily.  Dispense: 14 tablet; Refill: 0  -     ketoconazole (NIZORAL) 2 % cream; Apply topically once daily.  Dispense: 60 g; Refill: 1    Instability of left ankle joint  -     X-Ray Ankle Complete Left; Future; Expected date: 08/23/2023      I counseled the patient on his conditions, regarding findings of my examination, my impressions, and usual treatment plan.   Ordered left ankle x-rays to be completed today.   Patient was fitted with lace up ankle brace and instructed on proper usage. This should be worn daily for a minimum of 2 weeks at all times when ambulating.  Discuss treatment options for tinea pedis.  I explained that fungus lives in a warm dark moist environment and therefore patient should make every attempt to keep feet clean and dry.  We discussed drying feet thoroughly after shower  particularly between the toes.   Patient instructed to spray all shoes with Lysol disinfectant spray and let dry before wearing. Patient instructed to wash all socks in hot water and bleach.  We discussed using Lamisil for tinea pedis. This drug offers a fairly high but not universal cure rate. A 2-4 week treatment course is recommended. The patient is aware that rare cases of liver injury have been reported; and agrees to have a liver function tests performed. The symptoms of liver disease have been discussed; call if such occurs. Other side effects, such as headaches and rashes, have also been discussed.  Prescribed Lamisil 250mg to be taken once daily with food. Patient was instructed on dosing information. Discontinue if adverse effects occur   Prescribed Ketoconazole cream to be applied twice daily to areas of feet.   Patient to return in 4 weeks or sooner if needed.          Gabriel Mendoza DPM  [facility]

## 2023-09-20 ENCOUNTER — OFFICE VISIT (OUTPATIENT)
Dept: PODIATRY | Facility: CLINIC | Age: 53
End: 2023-09-20
Payer: MEDICARE

## 2023-09-20 VITALS — BODY MASS INDEX: 27.89 KG/M2 | WEIGHT: 229.06 LBS | HEIGHT: 76 IN

## 2023-09-20 DIAGNOSIS — M25.572 PAIN OF JOINT OF LEFT ANKLE AND FOOT: ICD-10-CM

## 2023-09-20 DIAGNOSIS — M25.372 INSTABILITY OF LEFT ANKLE JOINT: Primary | ICD-10-CM

## 2023-09-20 PROCEDURE — 99999 PR PBB SHADOW E&M-EST. PATIENT-LVL IV: ICD-10-PCS | Mod: PBBFAC,,, | Performed by: PODIATRIST

## 2023-09-20 PROCEDURE — 99213 PR OFFICE/OUTPT VISIT, EST, LEVL III, 20-29 MIN: ICD-10-PCS | Mod: S$GLB,,, | Performed by: PODIATRIST

## 2023-09-20 PROCEDURE — 1159F MED LIST DOCD IN RCRD: CPT | Mod: CPTII,S$GLB,, | Performed by: PODIATRIST

## 2023-09-20 PROCEDURE — 1159F PR MEDICATION LIST DOCUMENTED IN MEDICAL RECORD: ICD-10-PCS | Mod: CPTII,S$GLB,, | Performed by: PODIATRIST

## 2023-09-20 PROCEDURE — 3008F PR BODY MASS INDEX (BMI) DOCUMENTED: ICD-10-PCS | Mod: CPTII,S$GLB,, | Performed by: PODIATRIST

## 2023-09-20 PROCEDURE — 3008F BODY MASS INDEX DOCD: CPT | Mod: CPTII,S$GLB,, | Performed by: PODIATRIST

## 2023-09-20 PROCEDURE — 99213 OFFICE O/P EST LOW 20 MIN: CPT | Mod: S$GLB,,, | Performed by: PODIATRIST

## 2023-09-20 PROCEDURE — 1160F RVW MEDS BY RX/DR IN RCRD: CPT | Mod: CPTII,S$GLB,, | Performed by: PODIATRIST

## 2023-09-20 PROCEDURE — 99999 PR PBB SHADOW E&M-EST. PATIENT-LVL IV: CPT | Mod: PBBFAC,,, | Performed by: PODIATRIST

## 2023-09-20 PROCEDURE — 1160F PR REVIEW ALL MEDS BY PRESCRIBER/CLIN PHARMACIST DOCUMENTED: ICD-10-PCS | Mod: CPTII,S$GLB,, | Performed by: PODIATRIST

## 2023-09-20 NOTE — PROGRESS NOTES
Subjective:     Patient ID: Cornel Dick is a 53 y.o. male.    Chief Complaint: Follow-up (F/u left ankle pain, rates pain 5/10, (pain mainly when walking on it) wearing tennis and socks, non-diabetic, last seen Pcp Dr. Spaulding 08/18/23)    Cornel is a 53 y.o. male who presents to the podiatry clinic for follow up of left ankle pain. Patient states pain is about the same but the brace helps some when walking. Current symptoms include: ability to bear weight, but with some pain. Aggravating factors: walking. Symptoms have progressed to a point and plateaued. Treatment to date: elastic supporter which is somewhat effective. Patients rates pain 5/10 on pain scale. Patient has no other pedal complaints at this time.     Patient Active Problem List   Diagnosis    Osteoarthritis of left knee    Left ankle pain    Myofascial pain    Spondylosis of lumbar region without myelopathy or radiculopathy    DDD (degenerative disc disease), lumbar    Lumbar radiculopathy    Internal derangement of right knee    Arthritis of knee, left    Polyarthralgia    Primary osteoarthritis involving multiple joints    Iron deficiency anemia    Renal insufficiency    Erectile dysfunction    Benign essential hypertension    Risk for coronary artery disease between 10% and 20% in next 10 years    Cervical radiculopathy    Lateral epicondylitis of right elbow       Medication List with Changes/Refills   Current Medications    ACETAMINOPHEN (ARTHRITIS PAIN RELIEVER ORAL)    Take by mouth.    AMLODIPINE (NORVASC) 10 MG TABLET    Take 10 mg by mouth.    BAICALIN-CATECHIN (LIMBREL) 500 MG CAP    Take 500 mg by mouth 2 (two) times daily.    CELECOXIB (CELEBREX) 200 MG CAPSULE    Take 1 capsule (200 mg total) by mouth 2 (two) times daily as needed for Pain.    EFINACONAZOLE (JUBLIA) 10 % ANITHA    Apply 1 application topically once daily.    GABAPENTIN (NEURONTIN) 800 MG TABLET    Take 1 tablet (800 mg total) by mouth 3 (three) times daily.     "HYDRALAZINE (APRESOLINE) 100 MG TABLET        KETOCONAZOLE (NIZORAL) 2 % CREAM    Apply topically once daily.    LISINOPRIL-HYDROCHLOROTHIAZIDE (PRINZIDE,ZESTORETIC) 20-12.5 MG PER TABLET        MUPIROCIN (BACTROBAN) 2 % OINTMENT    Apply topically 3 (three) times daily.    MV-MN/FA/LYCOPENE/LUT/HB#178 (HUBERT MULTIVITAMIN FOR MEN ORAL)    Take by mouth.    OXAPROZIN (DAYPRO) 600 MG TABLET        ROSUVASTATIN (CRESTOR) 10 MG TABLET    Take 10 mg by mouth.    SILDENAFIL, ANTIHYPERTENSIVE, (REVATIO) 20 MG TAB        SIMVASTATIN (ZOCOR) 20 MG TABLET    Take 1 tablet (20 mg total) by mouth every evening. - for heart health    TERBINAFINE HCL (LAMISIL) 250 MG TABLET    Take 1 tablet (250 mg total) by mouth once daily.    TIZANIDINE (ZANAFLEX) 4 MG TABLET    TAKE ONE TABLET BY MOUTH TWICE DAILY AS NEEDED    TRIUMEQ 600- MG TAB           Review of patient's allergies indicates:   Allergen Reactions    Chocolate flavor Swelling       Past Surgical History:   Procedure Laterality Date    back stimulator      KNEE ARTHROSCOPY Left        Family History   Problem Relation Age of Onset    Heart disease Father        Social History     Socioeconomic History    Marital status:    Tobacco Use    Smoking status: Never    Smokeless tobacco: Never   Substance and Sexual Activity    Alcohol use: No     Alcohol/week: 0.0 standard drinks of alcohol    Drug use: No    Sexual activity: Yes     Partners: Female       Vitals:    09/20/23 1102   Weight: 103.9 kg (229 lb 0.9 oz)   Height: 6' 4" (1.93 m)   PainSc:   5       Review of Systems   Constitutional:  Negative for chills and fever.   Respiratory:  Negative for shortness of breath.    Cardiovascular:  Negative for chest pain, palpitations, orthopnea, claudication and leg swelling.   Gastrointestinal:  Negative for diarrhea, nausea and vomiting.   Musculoskeletal:  Positive for joint pain (left ankle).   Skin:  Negative for rash.   Neurological:  Negative for dizziness, " tingling, sensory change, focal weakness and weakness.   Psychiatric/Behavioral: Negative.             Objective:      PHYSICAL EXAM: Apperance: Alert and orient in no distress,well developed, and with good attention to grooming and body habits  Lower Extremity Exam  VASCULAR: Dorsalis pedis pulses 2/4 bilateral and Posterior Tibial pulses 2/4 bilateral.   DERMATOLOGICAL: No skin rash, subcutaneous nodules, lesions or ulcers observed. Dry and scaly skin noted plantarly left in moccasin distribution. Webspaces 1,2,3,4 bilateral are clean, dry and without evidence of break in skin integrity.    NEUROLOGICAL: Light touch, sharp-dull, proprioception all present and equal bilaterally.    MUSCULOSKELETAL: Muscle strength is 5/5 for foot inverters, everters, plantarflexors, and dorsiflexors. Muscle tone is normal. (+) tenderness on palpation of left lateral and anterior ankle. Decreased left ankle joint ROM.     TEST RESULTS: Radiographs of left foot/ankle taken reveals No acute fracture or dislocation.  No advanced arthritic change.          Assessment:       ICD-10-CM ICD-9-CM   1. Instability of left ankle joint  M25.372 718.87   2. Pain of joint of left ankle and foot  M25.572 719.47       Plan:   Instability of left ankle joint  -     CT Ankle (Including Hindfoot) Without Contrast Left; Future; Expected date: 09/20/2023    Pain of joint of left ankle and foot  -     CT Ankle (Including Hindfoot) Without Contrast Left; Future; Expected date: 09/20/2023      I counseled the patient on his conditions, regarding findings of my examination, my impressions, and usual treatment plan.   Reviewed left ankle x-rays in exam room with patient.   Ordered left ankle CT to rule out ligament abnormality.   Patient instructed to continue ankle brace daily.   Patient to return after CT completed.            Gabriel Mendoza DPM  [facility]

## 2023-10-02 ENCOUNTER — HOSPITAL ENCOUNTER (OUTPATIENT)
Dept: RADIOLOGY | Facility: HOSPITAL | Age: 53
Discharge: HOME | End: 2023-10-02
Attending: PODIATRIST
Payer: MEDICARE

## 2023-10-02 DIAGNOSIS — M25.372 INSTABILITY OF LEFT ANKLE JOINT: ICD-10-CM

## 2023-10-02 DIAGNOSIS — M25.572 PAIN OF JOINT OF LEFT ANKLE AND FOOT: ICD-10-CM

## 2023-10-02 PROCEDURE — 73700 CT LOWER EXTREMITY W/O DYE: CPT | Mod: TC,LT

## 2023-10-02 PROCEDURE — 73700 CT LOWER EXTREMITY W/O DYE: CPT | Mod: 26,LT,, | Performed by: RADIOLOGY

## 2023-10-02 PROCEDURE — 73700 CT ANKLE (INCLUDING HINDFOOT) WITHOUT CONTRAST LEFT: ICD-10-PCS | Mod: 26,LT,, | Performed by: RADIOLOGY

## 2023-10-10 ENCOUNTER — OFFICE VISIT (OUTPATIENT)
Dept: PODIATRY | Facility: CLINIC | Age: 53
End: 2023-10-10
Payer: MEDICARE

## 2023-10-10 VITALS — HEIGHT: 76 IN | BODY MASS INDEX: 27.89 KG/M2 | WEIGHT: 229.06 LBS

## 2023-10-10 DIAGNOSIS — M76.822 POSTERIOR TIBIAL TENDON DYSFUNCTION (PTTD) OF LEFT LOWER EXTREMITY: ICD-10-CM

## 2023-10-10 DIAGNOSIS — M25.372 INSTABILITY OF LEFT ANKLE JOINT: Primary | ICD-10-CM

## 2023-10-10 DIAGNOSIS — M25.572 PAIN OF JOINT OF LEFT ANKLE AND FOOT: ICD-10-CM

## 2023-10-10 PROCEDURE — 1160F PR REVIEW ALL MEDS BY PRESCRIBER/CLIN PHARMACIST DOCUMENTED: ICD-10-PCS | Mod: CPTII,S$GLB,, | Performed by: PODIATRIST

## 2023-10-10 PROCEDURE — 99999 PR PBB SHADOW E&M-EST. PATIENT-LVL III: CPT | Mod: PBBFAC,,, | Performed by: PODIATRIST

## 2023-10-10 PROCEDURE — 1160F RVW MEDS BY RX/DR IN RCRD: CPT | Mod: CPTII,S$GLB,, | Performed by: PODIATRIST

## 2023-10-10 PROCEDURE — 99213 PR OFFICE/OUTPT VISIT, EST, LEVL III, 20-29 MIN: ICD-10-PCS | Mod: S$GLB,,, | Performed by: PODIATRIST

## 2023-10-10 PROCEDURE — 3008F BODY MASS INDEX DOCD: CPT | Mod: CPTII,S$GLB,, | Performed by: PODIATRIST

## 2023-10-10 PROCEDURE — 1159F PR MEDICATION LIST DOCUMENTED IN MEDICAL RECORD: ICD-10-PCS | Mod: CPTII,S$GLB,, | Performed by: PODIATRIST

## 2023-10-10 PROCEDURE — 1159F MED LIST DOCD IN RCRD: CPT | Mod: CPTII,S$GLB,, | Performed by: PODIATRIST

## 2023-10-10 PROCEDURE — 99213 OFFICE O/P EST LOW 20 MIN: CPT | Mod: S$GLB,,, | Performed by: PODIATRIST

## 2023-10-10 PROCEDURE — 99999 PR PBB SHADOW E&M-EST. PATIENT-LVL III: ICD-10-PCS | Mod: PBBFAC,,, | Performed by: PODIATRIST

## 2023-10-10 PROCEDURE — 3008F PR BODY MASS INDEX (BMI) DOCUMENTED: ICD-10-PCS | Mod: CPTII,S$GLB,, | Performed by: PODIATRIST

## 2023-10-10 NOTE — PROGRESS NOTES
Subjective:     Patient ID: Cornel Dick is a 53 y.o. male.    Chief Complaint: Follow-up (Follow up CT scan, c/o states left ankle pain, rates pain 5/10. (Non-diabetic pt, wearing tennis, last seen PCP Dr. Spaulding 08/2023))    Cornel is a 53 y.o. male who presents to the podiatry clinic for follow up of left ankle pain. Patient states pain is a little better but the brace helps some when walking. Current symptoms include: ability to bear weight, but with some pain. Aggravating factors: walking. Symptoms have progressed to a point and plateaued. Treatment to date: elastic supporter which is somewhat effective. Patients rates pain 5/10 on pain scale. Patient has no other pedal complaints at this time.     Patient Active Problem List   Diagnosis    Osteoarthritis of left knee    Left ankle pain    Myofascial pain    Spondylosis of lumbar region without myelopathy or radiculopathy    DDD (degenerative disc disease), lumbar    Lumbar radiculopathy    Internal derangement of right knee    Arthritis of knee, left    Polyarthralgia    Primary osteoarthritis involving multiple joints    Iron deficiency anemia    Renal insufficiency    Erectile dysfunction    Benign essential hypertension    Risk for coronary artery disease between 10% and 20% in next 10 years    Cervical radiculopathy    Lateral epicondylitis of right elbow       Medication List with Changes/Refills   Current Medications    ACETAMINOPHEN (ARTHRITIS PAIN RELIEVER ORAL)    Take by mouth.    AMLODIPINE (NORVASC) 10 MG TABLET    Take 10 mg by mouth.    BAICALIN-CATECHIN (LIMBREL) 500 MG CAP    Take 500 mg by mouth 2 (two) times daily.    CELECOXIB (CELEBREX) 200 MG CAPSULE    Take 1 capsule (200 mg total) by mouth 2 (two) times daily as needed for Pain.    EFINACONAZOLE (JUBLIA) 10 % ANITHA    Apply 1 application topically once daily.    GABAPENTIN (NEURONTIN) 800 MG TABLET    Take 1 tablet (800 mg total) by mouth 3 (three) times daily.    HYDRALAZINE  "(APRESOLINE) 100 MG TABLET        KETOCONAZOLE (NIZORAL) 2 % CREAM    Apply topically once daily.    LISINOPRIL-HYDROCHLOROTHIAZIDE (PRINZIDE,ZESTORETIC) 20-12.5 MG PER TABLET        MUPIROCIN (BACTROBAN) 2 % OINTMENT    Apply topically 3 (three) times daily.    MV-MN/FA/LYCOPENE/LUT/HB#178 (HUBERT MULTIVITAMIN FOR MEN ORAL)    Take by mouth.    OXAPROZIN (DAYPRO) 600 MG TABLET        ROSUVASTATIN (CRESTOR) 10 MG TABLET    Take 10 mg by mouth.    SILDENAFIL, ANTIHYPERTENSIVE, (REVATIO) 20 MG TAB        SIMVASTATIN (ZOCOR) 20 MG TABLET    Take 1 tablet (20 mg total) by mouth every evening. - for heart health    TERBINAFINE HCL (LAMISIL) 250 MG TABLET    Take 1 tablet (250 mg total) by mouth once daily.    TIZANIDINE (ZANAFLEX) 4 MG TABLET    TAKE ONE TABLET BY MOUTH TWICE DAILY AS NEEDED    TRIUMEQ 600- MG TAB           Review of patient's allergies indicates:   Allergen Reactions    Chocolate flavor Swelling       Past Surgical History:   Procedure Laterality Date    back stimulator      KNEE ARTHROSCOPY Left        Family History   Problem Relation Age of Onset    Heart disease Father        Social History     Socioeconomic History    Marital status:    Tobacco Use    Smoking status: Never    Smokeless tobacco: Never   Substance and Sexual Activity    Alcohol use: No     Alcohol/week: 0.0 standard drinks of alcohol    Drug use: No    Sexual activity: Yes     Partners: Female       Vitals:    10/10/23 1047   Weight: 103.9 kg (229 lb 0.9 oz)   Height: 6' 4" (1.93 m)   PainSc:   5       Review of Systems   Constitutional:  Negative for chills and fever.   Respiratory:  Negative for shortness of breath.    Cardiovascular:  Negative for chest pain, palpitations, orthopnea, claudication and leg swelling.   Gastrointestinal:  Negative for diarrhea, nausea and vomiting.   Musculoskeletal:  Positive for joint pain (left ankle).   Skin:  Negative for rash.   Neurological:  Negative for dizziness, tingling, sensory " change, focal weakness and weakness.   Psychiatric/Behavioral: Negative.             Objective:      PHYSICAL EXAM: Apperance: Alert and orient in no distress,well developed, and with good attention to grooming and body habits  Lower Extremity Exam  VASCULAR: Dorsalis pedis pulses 2/4 bilateral and Posterior Tibial pulses 2/4 bilateral.   DERMATOLOGICAL: No skin rash, subcutaneous nodules, lesions or ulcers observed. Dry and scaly skin noted plantarly left in moccasin distribution. Webspaces 1,2,3,4 bilateral are clean, dry and without evidence of break in skin integrity.    NEUROLOGICAL: Light touch, sharp-dull, proprioception all present and equal bilaterally.    MUSCULOSKELETAL: Muscle strength is 5/5 for foot inverters, everters, plantarflexors, and dorsiflexors. Muscle tone is normal. (-) tenderness on palpation of left lateral and anterior ankle. Decreased left ankle joint ROM.     TEST RESULTS: CT of left ankle taken 10/02/2023 reveals slight prominence of the tibialis posterior tendon, possible tendinous injury.  There is soft tissue stranding in the region of the ankle.  Radiographs of left foot/ankle taken reveals No acute fracture or dislocation.  No advanced arthritic change.          Assessment:       ICD-10-CM ICD-9-CM   1. Instability of left ankle joint  M25.372 718.87   2. Posterior tibial tendon dysfunction (PTTD) of left lower extremity  M76.822 734   3. Pain of joint of left ankle and foot  M25.572 719.47       Plan:   Instability of left ankle joint    Posterior tibial tendon dysfunction (PTTD) of left lower extremity    Pain of joint of left ankle and foot    I counseled the patient on his conditions, regarding findings of my examination, my impressions, and usual treatment plan.   Reviewed left CT results in exam room with patient.   The patient and I reviewed the types of shoes he should be wearing, my recommendation includes generally the best time of the day for a shoe fitting is the  afternoon, shoes with a wide toe box, very good cushion, and tennis shoes with removable inner soles.The patient and I reviewed my recommendations for over-the-counter orthotic inserts.   Patient to return if symptoms persist or worsen.         Gabriel Mendoza DPM  [facility]

## (undated) DEVICE — Device

## (undated) DEVICE — COVER OVERHEAD SURG LT BLUE

## (undated) DEVICE — APPLICATOR CHLORAPREP ORN 26ML

## (undated) DEVICE — GAUZE SPONGE 4X4 12PLY

## (undated) DEVICE — CLOSURE SKIN STERI STRIP 1/2X4

## (undated) DEVICE — GLOVE SURG BIOGEL LATEX SZ 7.5

## (undated) DEVICE — NDL ECLIPSE SAFETY 18GX1-1/2IN

## (undated) DEVICE — ADHESIVE MASTISOL VIAL 48/BX

## (undated) DEVICE — SYR 10CC LUER LOCK

## (undated) DEVICE — DRESSING TRANS 4X4 TEGADERM

## (undated) DEVICE — NDL SAFETY 25G X 1.5 ECLIPSE

## (undated) DEVICE — SEE MEDLINE ITEM 157027

## (undated) DEVICE — MANIFOLD 4 PORT

## (undated) DEVICE — DRAPE MOBILE C-ARM

## (undated) DEVICE — DRAPE ABDOMINAL TIBURON 14X11

## (undated) DEVICE — ELECTRODE REM PLYHSV RETURN 9

## (undated) DEVICE — SEE MEDLINE ITEM 157117

## (undated) DEVICE — CABLE EXT SPECTRA 2FT 1X16

## (undated) DEVICE — SEE MEDLINE ITEM 152622

## (undated) DEVICE — DECANTER VIAL ASEPTIC TRANSFER